# Patient Record
Sex: FEMALE | Race: BLACK OR AFRICAN AMERICAN | NOT HISPANIC OR LATINO | Employment: UNEMPLOYED | URBAN - METROPOLITAN AREA
[De-identification: names, ages, dates, MRNs, and addresses within clinical notes are randomized per-mention and may not be internally consistent; named-entity substitution may affect disease eponyms.]

---

## 2017-05-06 ENCOUNTER — HOSPITAL ENCOUNTER (EMERGENCY)
Facility: OTHER | Age: 32
Discharge: HOME | End: 2017-05-07
Attending: EMERGENCY MEDICINE
Payer: COMMERCIAL

## 2017-05-06 DIAGNOSIS — M25.519 SHOULDER PAIN: ICD-10-CM

## 2017-05-06 DIAGNOSIS — R07.89 ACUTE CHEST WALL PAIN: ICD-10-CM

## 2017-05-06 DIAGNOSIS — M54.2 NECK PAIN: ICD-10-CM

## 2017-05-06 LAB
B-HCG UR QL: NEGATIVE
CTP QC/QA: YES

## 2017-05-06 PROCEDURE — 63600175 PHARM REV CODE 636 W HCPCS: Performed by: EMERGENCY MEDICINE

## 2017-05-06 PROCEDURE — 99284 EMERGENCY DEPT VISIT MOD MDM: CPT | Mod: 25

## 2017-05-06 PROCEDURE — 81025 URINE PREGNANCY TEST: CPT | Performed by: EMERGENCY MEDICINE

## 2017-05-06 PROCEDURE — 96372 THER/PROPH/DIAG INJ SC/IM: CPT

## 2017-05-06 RX ORDER — INSULIN ASPART 100 [IU]/ML
INJECTION, SUSPENSION SUBCUTANEOUS
COMMUNITY

## 2017-05-06 RX ORDER — INSULIN GLARGINE 100 [IU]/ML
INJECTION, SOLUTION SUBCUTANEOUS NIGHTLY
COMMUNITY

## 2017-05-06 RX ORDER — ORPHENADRINE CITRATE 30 MG/ML
60 INJECTION INTRAMUSCULAR; INTRAVENOUS
Status: COMPLETED | OUTPATIENT
Start: 2017-05-06 | End: 2017-05-06

## 2017-05-06 RX ORDER — ALBUTEROL SULFATE 0.63 MG/3ML
0.63 SOLUTION RESPIRATORY (INHALATION) EVERY 6 HOURS PRN
COMMUNITY

## 2017-05-06 RX ORDER — KETOROLAC TROMETHAMINE 30 MG/ML
60 INJECTION, SOLUTION INTRAMUSCULAR; INTRAVENOUS
Status: COMPLETED | OUTPATIENT
Start: 2017-05-06 | End: 2017-05-06

## 2017-05-06 RX ORDER — LISINOPRIL 10 MG/1
10 TABLET ORAL DAILY
COMMUNITY

## 2017-05-06 RX ADMIN — KETOROLAC TROMETHAMINE 60 MG: 30 INJECTION, SOLUTION INTRAMUSCULAR at 10:05

## 2017-05-06 RX ADMIN — ORPHENADRINE CITRATE 60 MG: 30 INJECTION INTRAMUSCULAR; INTRAVENOUS at 10:05
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
## 2017-05-07 VITALS
TEMPERATURE: 99 F | SYSTOLIC BLOOD PRESSURE: 143 MMHG | WEIGHT: 250 LBS | HEART RATE: 93 BPM | DIASTOLIC BLOOD PRESSURE: 73 MMHG | OXYGEN SATURATION: 98 % | RESPIRATION RATE: 14 BRPM | HEIGHT: 66 IN | BODY MASS INDEX: 40.18 KG/M2

## 2017-05-07 RX ORDER — CYCLOBENZAPRINE HCL 10 MG
10 TABLET ORAL 3 TIMES DAILY PRN
Qty: 15 TABLET | Refills: 0 | Status: SHIPPED | OUTPATIENT
Start: 2017-05-07 | End: 2017-05-12

## 2017-05-07 RX ORDER — OXAPROZIN 600 MG/1
1200 TABLET, FILM COATED ORAL DAILY
Qty: 20 TABLET | Refills: 0 | Status: SHIPPED | OUTPATIENT
Start: 2017-05-07 | End: 2017-05-17
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
## 2017-05-07 NOTE — DISCHARGE INSTRUCTIONS
RICE     Rest an injury, elevate it, and use ice and compression as directed.   RICE stands for rest, ice, compression, and elevation. These can limit pain and swelling after an injury. RICE may be recommended to help treat fractures, sprains, strains, and bruises or bumps.   Home care  The following explain the details of RICE:  · Rest. Limit the use of the injured body part. This helps prevent further damage to the body part and gives it time to heal. In some cases, you may need a sling, brace, splint, or cast to help keep the body part still until it has healed.  · Ice. Applying ice right after an injury helps relieve pain and swelling. Wrap a bag of ice in a thin towel. Then, place it over the injured area. Do this for 10 to 15 minutes every 3 to 4 hours. Continue for the next 1 to 3 days or until your symptoms improve. Never put ice directly on your skin or ice an area longer than 15 minutes at a time.  · Compression. Putting pressure on an injury helps reduce swelling and provides support. Wrap the injured area firmly with an elastic bandage/wrap. Make sure not to wrap the bandage too tightly or you will cut off blood flow to the injured area. If your bandage loosens, rewrap it.  · Elevation. Keeping an injury raised above the level of your heart reduces swelling, pain, and throbbing. For instance, if you have a broken leg, it may help to rest your leg on several pillows when sitting or lying down. Try to keep the injured area elevated for at least 2 to 3 hours per day.  Follow-up care  Follow up with your healthcare provider, or as advised.  When to seek medical advice  Call your healthcare provider right away if any of these occur:  · Fever of 100.4°F (38°C) or higher, or as directed by your healthcare provider  · Increased pain or swelling in the injured body part  · Injured body part becomes cold, blue, numb, or tingly  · Signs of infection. These include warmth in the skin, redness, drainage, or bad  smell coming from the injured body part.  Date Last Reviewed: 1/18/2016  © 7630-9245 [employer]. [address] [place] [address], [place] [address]. All rights reserved. This information is not intended as a substitute for professional medical care. Always follow your healthcare professional's instructions.

## 2023-10-05 ENCOUNTER — TELEPHONE (OUTPATIENT)
Dept: TRANSPLANT | Facility: HOSPITAL | Age: 38
End: 2023-10-05
Payer: MEDICARE

## 2023-10-05 RX ORDER — ATORVASTATIN CALCIUM 20 MG/1
20 TABLET, FILM COATED ORAL DAILY
COMMUNITY
Start: 2023-02-09

## 2023-10-05 RX ORDER — AMLODIPINE BESYLATE 5 MG/1
5 TABLET ORAL DAILY
COMMUNITY
Start: 2023-06-22 | End: 2023-07-22 | Stop reason: WASHOUT

## 2023-10-05 RX ORDER — UBIQUINOL 100 MG
CAPSULE ORAL
COMMUNITY
Start: 2023-02-22

## 2023-10-05 RX ORDER — FERROUS SULFATE 325(65) MG
1 TABLET ORAL EVERY OTHER DAY
COMMUNITY
Start: 2022-12-15

## 2023-10-05 RX ORDER — METOPROLOL SUCCINATE 50 MG/1
50 TABLET, EXTENDED RELEASE ORAL DAILY
COMMUNITY
Start: 2023-06-22 | End: 2023-07-22 | Stop reason: WASHOUT

## 2023-10-05 RX ORDER — ATORVASTATIN CALCIUM 10 MG/1
TABLET, FILM COATED ORAL
COMMUNITY
Start: 2023-08-21 | End: 2024-05-16 | Stop reason: WASHOUT

## 2023-10-05 RX ORDER — ERGOCALCIFEROL 1.25 MG/1
1 CAPSULE ORAL
COMMUNITY
Start: 2023-05-31

## 2023-10-05 RX ORDER — MONTELUKAST SODIUM 10 MG/1
10 TABLET ORAL DAILY
COMMUNITY
Start: 2023-09-15

## 2023-10-05 RX ORDER — ALBUTEROL SULFATE 90 UG/1
2 AEROSOL, METERED RESPIRATORY (INHALATION) 4 TIMES DAILY PRN
COMMUNITY
Start: 2023-02-22

## 2023-10-05 RX ORDER — CETIRIZINE HYDROCHLORIDE 10 MG/1
10 TABLET ORAL DAILY
COMMUNITY
Start: 2023-01-02 | End: 2024-03-16 | Stop reason: WASHOUT

## 2023-10-05 RX ORDER — SODIUM BICARBONATE 650 MG/1
2 TABLET ORAL 2 TIMES DAILY
COMMUNITY
Start: 2023-06-22 | End: 2024-03-16 | Stop reason: WASHOUT

## 2023-10-05 RX ORDER — METOPROLOL SUCCINATE 25 MG/1
25 TABLET, EXTENDED RELEASE ORAL DAILY
Status: ON HOLD | COMMUNITY
Start: 2023-09-15 | End: 2023-12-29 | Stop reason: DRUGHIGH

## 2023-10-05 RX ORDER — MEDROXYPROGESTERONE ACETATE 150 MG/ML
INJECTION, SUSPENSION INTRAMUSCULAR
COMMUNITY
Start: 2023-09-14 | End: 2023-12-31 | Stop reason: HOSPADM

## 2023-10-05 RX ORDER — INSULIN GLARGINE 100 [IU]/ML
INJECTION, SOLUTION SUBCUTANEOUS
COMMUNITY
Start: 2023-09-15

## 2023-10-05 RX ORDER — FLUTICASONE PROPIONATE 44 UG/1
2 AEROSOL, METERED RESPIRATORY (INHALATION) 2 TIMES DAILY
COMMUNITY
Start: 2023-02-22

## 2023-10-05 RX ORDER — INSULIN ASPART 100 [IU]/ML
INJECTION, SOLUTION INTRAVENOUS; SUBCUTANEOUS
COMMUNITY
Start: 2023-09-15

## 2023-10-05 RX ORDER — ALBUTEROL SULFATE 0.63 MG/3ML
SOLUTION RESPIRATORY (INHALATION)
COMMUNITY
Start: 2023-01-04

## 2023-10-05 RX ORDER — CALCITRIOL 0.5 UG/1
0.5 CAPSULE ORAL DAILY
COMMUNITY
Start: 2023-09-15 | End: 2024-05-16 | Stop reason: WASHOUT

## 2023-10-05 RX ORDER — PEN NEEDLE, DIABETIC 32GX 5/32"
NEEDLE, DISPOSABLE MISCELLANEOUS
COMMUNITY
Start: 2023-02-22

## 2023-10-20 ENCOUNTER — DOCUMENTATION (OUTPATIENT)
Dept: TRANSPLANT | Facility: HOSPITAL | Age: 38
End: 2023-10-20

## 2023-10-20 ENCOUNTER — LAB (OUTPATIENT)
Dept: LAB | Facility: LAB | Age: 38
End: 2023-10-20
Payer: MEDICARE

## 2023-10-20 ENCOUNTER — OFFICE VISIT (OUTPATIENT)
Dept: TRANSPLANT | Facility: HOSPITAL | Age: 38
End: 2023-10-20
Payer: MEDICARE

## 2023-10-20 ENCOUNTER — APPOINTMENT (OUTPATIENT)
Dept: TRANSPLANT | Facility: HOSPITAL | Age: 38
End: 2023-10-20
Payer: MEDICARE

## 2023-10-20 ENCOUNTER — SOCIAL WORK (OUTPATIENT)
Dept: TRANSPLANT | Facility: HOSPITAL | Age: 38
End: 2023-10-20
Payer: MEDICARE

## 2023-10-20 VITALS
BODY MASS INDEX: 44.41 KG/M2 | WEIGHT: 293 LBS | HEIGHT: 68 IN | OXYGEN SATURATION: 95 % | TEMPERATURE: 97.6 F | DIASTOLIC BLOOD PRESSURE: 80 MMHG | SYSTOLIC BLOOD PRESSURE: 145 MMHG | HEART RATE: 101 BPM

## 2023-10-20 DIAGNOSIS — E13.29 OTHER SPECIFIED DIABETES MELLITUS WITH OTHER DIABETIC KIDNEY COMPLICATION (MULTI): ICD-10-CM

## 2023-10-20 DIAGNOSIS — Z01.818 PRE-TRANSPLANT EVALUATION FOR KIDNEY TRANSPLANT: Primary | ICD-10-CM

## 2023-10-20 DIAGNOSIS — Z51.81 ENCOUNTER FOR THERAPEUTIC DRUG LEVEL MONITORING: ICD-10-CM

## 2023-10-20 DIAGNOSIS — N18.6 END STAGE RENAL DISEASE (MULTI): ICD-10-CM

## 2023-10-20 LAB
ABO GROUP (TYPE) IN BLOOD: NORMAL
ALBUMIN SERPL BCP-MCNC: 3.6 G/DL (ref 3.4–5)
ALP SERPL-CCNC: 77 U/L (ref 33–110)
ALT SERPL W P-5'-P-CCNC: 8 U/L (ref 7–45)
AMYLASE SERPL-CCNC: 21 U/L (ref 29–103)
APPEARANCE UR: CLEAR
AST SERPL W P-5'-P-CCNC: 9 U/L (ref 9–39)
BACTERIA #/AREA URNS AUTO: ABNORMAL /HPF
BILIRUB DIRECT SERPL-MCNC: 0.1 MG/DL (ref 0–0.3)
BILIRUB SERPL-MCNC: 0.4 MG/DL (ref 0–1.2)
BILIRUB UR STRIP.AUTO-MCNC: NEGATIVE MG/DL
BUN SERPL-MCNC: 39 MG/DL (ref 6–23)
C PEPTIDE SERPL-MCNC: 6.6 NG/ML (ref 0.7–3.9)
COLOR UR: ABNORMAL
CREAT SERPL-MCNC: 6.22 MG/DL (ref 0.5–1.05)
EBV EA IGG SER QL: NEGATIVE
EBV NA AB SER QL: POSITIVE
EBV VCA IGG SER IA-ACNC: POSITIVE
EBV VCA IGM SER IA-ACNC: ABNORMAL
ERYTHROCYTE [DISTWIDTH] IN BLOOD BY AUTOMATED COUNT: 13.3 % (ref 11.5–14.5)
EST. AVERAGE GLUCOSE BLD GHB EST-MCNC: 329 MG/DL
GFR SERPL CREATININE-BSD FRML MDRD: 8 ML/MIN/1.73M*2
GLUCOSE UR STRIP.AUTO-MCNC: ABNORMAL MG/DL
HBA1C MFR BLD: 13.1 %
HBV CORE AB SER QL: REACTIVE
HBV SURFACE AB SER-ACNC: 39.2 MIU/ML
HBV SURFACE AG SERPL QL IA: NONREACTIVE
HCT VFR BLD AUTO: 35.2 % (ref 36–46)
HCV AB SER QL: NONREACTIVE
HGB BLD-MCNC: 11 G/DL (ref 12–16)
HIV 1+2 AB+HIV1 P24 AG SERPL QL IA: NONREACTIVE
HOLD SPECIMEN: NORMAL
KETONES UR STRIP.AUTO-MCNC: NEGATIVE MG/DL
LEUKOCYTE ESTERASE UR QL STRIP.AUTO: NEGATIVE
MCH RBC QN AUTO: 27 PG (ref 26–34)
MCHC RBC AUTO-ENTMCNC: 31.3 G/DL (ref 32–36)
MCV RBC AUTO: 86 FL (ref 80–100)
NITRITE UR QL STRIP.AUTO: NEGATIVE
NRBC BLD-RTO: 0 /100 WBCS (ref 0–0)
PH UR STRIP.AUTO: 8 [PH]
PHOSPHATE SERPL-MCNC: 5.2 MG/DL (ref 2.5–4.9)
PLATELET # BLD AUTO: 212 X10*3/UL (ref 150–450)
PMV BLD AUTO: 10.7 FL (ref 7.5–11.5)
PROT SERPL-MCNC: 6.8 G/DL (ref 6.4–8.2)
PROT UR STRIP.AUTO-MCNC: ABNORMAL MG/DL
RBC # BLD AUTO: 4.08 X10*6/UL (ref 4–5.2)
RBC # UR STRIP.AUTO: ABNORMAL /UL
RBC #/AREA URNS AUTO: ABNORMAL /HPF
RH FACTOR (ANTIGEN D): NORMAL
SP GR UR STRIP.AUTO: 1.01
SQUAMOUS #/AREA URNS AUTO: ABNORMAL /HPF
T PALLIDUM AB SER QL: NONREACTIVE
UROBILINOGEN UR STRIP.AUTO-MCNC: <2 MG/DL
WBC # BLD AUTO: 8.9 X10*3/UL (ref 4.4–11.3)
WBC #/AREA URNS AUTO: ABNORMAL /HPF

## 2023-10-20 PROCEDURE — 86803 HEPATITIS C AB TEST: CPT

## 2023-10-20 PROCEDURE — 87389 HIV-1 AG W/HIV-1&-2 AB AG IA: CPT

## 2023-10-20 PROCEDURE — 86663 EPSTEIN-BARR ANTIBODY: CPT

## 2023-10-20 PROCEDURE — 82150 ASSAY OF AMYLASE: CPT

## 2023-10-20 PROCEDURE — 86780 TREPONEMA PALLIDUM: CPT

## 2023-10-20 PROCEDURE — 89240 UNLISTED MISC PATH TEST: CPT | Performed by: STUDENT IN AN ORGANIZED HEALTH CARE EDUCATION/TRAINING PROGRAM

## 2023-10-20 PROCEDURE — 85027 COMPLETE CBC AUTOMATED: CPT

## 2023-10-20 PROCEDURE — 84100 ASSAY OF PHOSPHORUS: CPT

## 2023-10-20 PROCEDURE — 80323 ALKALOIDS NOS: CPT

## 2023-10-20 PROCEDURE — 86665 EPSTEIN-BARR CAPSID VCA: CPT

## 2023-10-20 PROCEDURE — 84520 ASSAY OF UREA NITROGEN: CPT

## 2023-10-20 PROCEDURE — 86900 BLOOD TYPING SEROLOGIC ABO: CPT

## 2023-10-20 PROCEDURE — 80076 HEPATIC FUNCTION PANEL: CPT

## 2023-10-20 PROCEDURE — 80307 DRUG TEST PRSMV CHEM ANLYZR: CPT

## 2023-10-20 PROCEDURE — 86825 HLA X-MATH NON-CYTOTOXIC: CPT | Performed by: STUDENT IN AN ORGANIZED HEALTH CARE EDUCATION/TRAINING PROGRAM

## 2023-10-20 PROCEDURE — 86644 CMV ANTIBODY: CPT

## 2023-10-20 PROCEDURE — 87799 DETECT AGENT NOS DNA QUANT: CPT

## 2023-10-20 PROCEDURE — 86664 EPSTEIN-BARR NUCLEAR ANTIGEN: CPT

## 2023-10-20 PROCEDURE — 83036 HEMOGLOBIN GLYCOSYLATED A1C: CPT

## 2023-10-20 PROCEDURE — 86481 TB AG RESPONSE T-CELL SUSP: CPT

## 2023-10-20 PROCEDURE — 36415 COLL VENOUS BLD VENIPUNCTURE: CPT

## 2023-10-20 PROCEDURE — 86704 HEP B CORE ANTIBODY TOTAL: CPT

## 2023-10-20 PROCEDURE — 87340 HEPATITIS B SURFACE AG IA: CPT

## 2023-10-20 PROCEDURE — 81001 URINALYSIS AUTO W/SCOPE: CPT

## 2023-10-20 PROCEDURE — 82565 ASSAY OF CREATININE: CPT

## 2023-10-20 PROCEDURE — 86706 HEP B SURFACE ANTIBODY: CPT

## 2023-10-20 PROCEDURE — 81382 HLA II TYPING 1 LOC HR: CPT | Performed by: STUDENT IN AN ORGANIZED HEALTH CARE EDUCATION/TRAINING PROGRAM

## 2023-10-20 PROCEDURE — 86901 BLOOD TYPING SEROLOGIC RH(D): CPT

## 2023-10-20 PROCEDURE — 84681 ASSAY OF C-PEPTIDE: CPT

## 2023-10-20 SDOH — ECONOMIC STABILITY: FOOD INSECURITY: WITHIN THE PAST 12 MONTHS, YOU WORRIED THAT YOUR FOOD WOULD RUN OUT BEFORE YOU GOT MONEY TO BUY MORE.: SOMETIMES TRUE

## 2023-10-20 SDOH — ECONOMIC STABILITY: FOOD INSECURITY: WITHIN THE PAST 12 MONTHS, THE FOOD YOU BOUGHT JUST DIDN'T LAST AND YOU DIDN'T HAVE MONEY TO GET MORE.: OFTEN TRUE

## 2023-10-20 SDOH — ECONOMIC STABILITY: TRANSPORTATION INSECURITY
IN THE PAST 12 MONTHS, HAS THE LACK OF TRANSPORTATION KEPT YOU FROM MEDICAL APPOINTMENTS OR FROM GETTING MEDICATIONS?: NO

## 2023-10-20 SDOH — ECONOMIC STABILITY: TRANSPORTATION INSECURITY
IN THE PAST 12 MONTHS, HAS LACK OF TRANSPORTATION KEPT YOU FROM MEETINGS, WORK, OR FROM GETTING THINGS NEEDED FOR DAILY LIVING?: NO

## 2023-10-20 ASSESSMENT — PATIENT HEALTH QUESTIONNAIRE - PHQ9
8. MOVING OR SPEAKING SO SLOWLY THAT OTHER PEOPLE COULD HAVE NOTICED. OR THE OPPOSITE, BEING SO FIGETY OR RESTLESS THAT YOU HAVE BEEN MOVING AROUND A LOT MORE THAN USUAL: NOT AT ALL
1. LITTLE INTEREST OR PLEASURE IN DOING THINGS: SEVERAL DAYS
2. FEELING DOWN, DEPRESSED OR HOPELESS: NOT AT ALL
4. FEELING TIRED OR HAVING LITTLE ENERGY: NEARLY EVERY DAY
2. FEELING DOWN, DEPRESSED OR HOPELESS: NOT AT ALL
7. TROUBLE CONCENTRATING ON THINGS, SUCH AS READING THE NEWSPAPER OR WATCHING TELEVISION: NOT AT ALL
1. LITTLE INTEREST OR PLEASURE IN DOING THINGS: SEVERAL DAYS
SUM OF ALL RESPONSES TO PHQ9 QUESTIONS 1 & 2: 1
SUM OF ALL RESPONSES TO PHQ9 QUESTIONS 1 & 2: 1
3. TROUBLE FALLING OR STAYING ASLEEP OR SLEEPING TOO MUCH: NEARLY EVERY DAY
9. THOUGHTS THAT YOU WOULD BE BETTER OFF DEAD, OR OF HURTING YOURSELF: NOT AT ALL
10. IF YOU CHECKED OFF ANY PROBLEMS, HOW DIFFICULT HAVE THESE PROBLEMS MADE IT FOR YOU TO DO YOUR WORK, TAKE CARE OF THINGS AT HOME, OR GET ALONG WITH OTHER PEOPLE: NOT DIFFICULT AT ALL
5. POOR APPETITE OR OVEREATING: NOT AT ALL
6. FEELING BAD ABOUT YOURSELF - OR THAT YOU ARE A FAILURE OR HAVE LET YOURSELF OR YOUR FAMILY DOWN: MORE THAN HALF THE DAYS
SUM OF ALL RESPONSES TO PHQ QUESTIONS 1-9: 9

## 2023-10-20 ASSESSMENT — ANXIETY QUESTIONNAIRES
2. NOT BEING ABLE TO STOP OR CONTROL WORRYING: NEARLY EVERY DAY
GAD7 TOTAL SCORE: 12
6. BECOMING EASILY ANNOYED OR IRRITABLE: NEARLY EVERY DAY
5. BEING SO RESTLESS THAT IT IS HARD TO SIT STILL: NOT AT ALL
3. WORRYING TOO MUCH ABOUT DIFFERENT THINGS: MORE THAN HALF THE DAYS
IF YOU CHECKED OFF ANY PROBLEMS ON THIS QUESTIONNAIRE, HOW DIFFICULT HAVE THESE PROBLEMS MADE IT FOR YOU TO DO YOUR WORK, TAKE CARE OF THINGS AT HOME, OR GET ALONG WITH OTHER PEOPLE: SOMEWHAT DIFFICULT
7. FEELING AFRAID AS IF SOMETHING AWFUL MIGHT HAPPEN: NOT AT ALL
4. TROUBLE RELAXING: NEARLY EVERY DAY
1. FEELING NERVOUS, ANXIOUS, OR ON EDGE: SEVERAL DAYS

## 2023-10-20 ASSESSMENT — COLUMBIA-SUICIDE SEVERITY RATING SCALE - C-SSRS
1. IN THE PAST MONTH, HAVE YOU WISHED YOU WERE DEAD OR WISHED YOU COULD GO TO SLEEP AND NOT WAKE UP?: NO
6. HAVE YOU EVER DONE ANYTHING, STARTED TO DO ANYTHING, OR PREPARED TO DO ANYTHING TO END YOUR LIFE?: NO
2. HAVE YOU ACTUALLY HAD ANY THOUGHTS OF KILLING YOURSELF?: NO

## 2023-10-20 ASSESSMENT — PAIN SCALES - GENERAL: PAINLEVEL: 0-NO PAIN
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
## 2023-10-20 NOTE — PROGRESS NOTES
PRE-TRANSPLANT EDUCATION  Patient received education regarding the following topics as part of their pre-transplant evaluation:  The evaluation process, including:   Transplant team members and roles    Required consultations and testing   Selection criteria and suitability for transplant   Listing process and receiving an organ offer   Psychosocial and financial considerations for a successful transplant   Patient responsibilities, including the necessity of adhering to a strict medical regimen  An overview of the surgical procedure   Potential medical, surgical, and psychosocial risks to transplantation, including:   Wound infection   Pneumonia   Blood clot formation   Organ rejection, failure, and possibility of re-transplantation   Lifetime immunosuppression therapy and associated risks   Arrhythmias and cardiovascular collapse   Multi-organ system failure   Death   Depression   Post-Traumatic Stress Disorder   Generalized anxiety, issues of dependence, and feelings of guilt  Available alternatives to transplantation  Donor risk factors that could affect the success of the transplant and the health of the patient, including:   Donor age   Donor medical and social history   Condition of the organ   Risk of jacinta cancer, HIV, Hepatitis B, Hepatitis C, or malaria if the infection is not detectable at the time of donation  Patient?s right to withdraw consent for transplantation at any time during the process  Transplants not performed in a Medicare-approved transplant center could affect the patient?s ability to have immunosuppression medication paid for under Medicare part B.   Multiple listing options.    Patient was given the opportunity to have questions answered. Patient was provided a copy of the informed consent for transplant evaluation.    Signed evaluation informed consent received? 10/20/2023

## 2023-10-21 LAB — CMV IGG AVIDITY SERPL IA-RTO: REACTIVE %

## 2023-10-22 LAB
AMPHETAMINES SERPL QL SCN: NEGATIVE NG/ML
ANNOTATION COMMENT IMP: NORMAL
BARBITURATES SERPL QL SCN: NEGATIVE NG/ML
BENZODIAZ SERPL QL SCN: NEGATIVE NG/ML
BUPRENORPHINE SERPL-MCNC: NEGATIVE NG/ML
CANNABINOIDS SERPL QL SCN: NEGATIVE NG/ML
COCAINE SERPL QL SCN: NEGATIVE NG/ML
METHADONE SERPL QL SCN: NEGATIVE NG/ML
METHAMPHET SERPL QL: NEGATIVE NG/ML
NIL(NEG) CONTROL SPOT COUNT: NORMAL
OPIATES SERPL QL SCN: NEGATIVE NG/ML
OXYCODONE SERPL QL: NEGATIVE NG/ML
PANEL A SPOT COUNT: 1
PANEL B SPOT COUNT: 1
PCP SERPL QL SCN: NEGATIVE NG/ML
POS CONTROL SPOT COUNT: NORMAL
T-SPOT. TB INTERPRETATION: NEGATIVE

## 2023-10-23 LAB
COTININE SERPL-MCNC: <5 NG/ML
FLOW AUTOCROSSMATCH: NORMAL
FLOW AUTOCROSSMATCH: NORMAL
HLA CLASS I AB SCREEN,FC: NORMAL
HLA CLASS II AB SCREEN,FC: NORMAL
HLA CLS I TYP PNL BLD/T DONR HIGH RES: NORMAL
HLA RESULTS: NORMAL
HLA RESULTS: NORMAL
HLA-DP2 QL: NORMAL
HLA-DQB1 HIGH RES: NORMAL
HLA-DRB1 HIGH RES: NORMAL
NICOTINE SERPL-MCNC: <5 NG/ML

## 2023-10-24 LAB
SCAN RESULT: NORMAL
VZV QPCR,QUANT,PLASMA, VIRC: NOT DETECTED COPIES/ML

## 2023-10-25 PROCEDURE — 93306 TTE W/DOPPLER COMPLETE: CPT | Performed by: INTERNAL MEDICINE

## 2023-10-27 PROCEDURE — 93308 TTE F-UP OR LMTD: CPT | Performed by: INTERNAL MEDICINE

## 2023-10-30 LAB
HLA CLS I TYP PNL BLD/T DONR HIGH RES: NORMAL
HLA RESULTS: NORMAL
HLA-DP2 QL: NORMAL
HLA-DQB1 HIGH RES: NORMAL
HLA-DRB1 HIGH RES: NORMAL

## 2023-11-01 LAB
HLA CLASS I AB SCREEN,FC: NORMAL
HLA CLASS II AB SCREEN,FC: NORMAL
HLA RESULTS: NORMAL

## 2023-11-02 ENCOUNTER — COMMITTEE REVIEW (OUTPATIENT)
Dept: TRANSPLANT | Facility: HOSPITAL | Age: 38
End: 2023-11-02
Payer: MEDICARE

## 2023-11-02 NOTE — COMMITTEE REVIEW
Patient discussed at selection committee.  Close eval.  She will need to have stable housing, she will need to establish care with a mental health professional and we will need her to come back with her primary support to see SW once this has been established.  Will put in a referral for the food pantry.

## 2023-11-02 NOTE — LETTER
November 2, 2023    Alexus Haley  [address] [facility] [address]      Dear Ms. Haley:    Our multi-disciplinary transplant team completed a review of your medical records on 11/2/2023.  ***    Our transplant program consists of surgeons and medical doctors who provide coverage 365 days a year, 24 hours a day.     If you have any questions or concerns regarding your insurance coverage or billing issues, a  is available to speak with you.     It is important to keep us updated of any major changes in your medical condition, contact information and health insurance coverage.     Please don't hesitate to contact us at Dept: [phone] with any questions or concerns. We look forward to working with you through this process.      Sincerely,      Em Cardona RN          The OS Toll-free Patient Services Line:  Your Resource for Organ Transplant Information    If you have a question regarding your own medical care, you always should call your transplant hospital first. However, for general organ transplant-related information, you should call the United Network for Organ Sharing (UNOS) toll-free patient services line at 1-554.501.4591.  Anyone, including potential transplant candidates, candidates, recipients, family members, friends, living donors, and donor family members, can call this number to:    Talk about organ donation, living donation, the transplant process, the donation process, and transplant policies.  Get a free patient information kit with helpful booklets, waiting list and transplant information, and a list of all transplant hospitals.  Ask questions about the Organ Procurement and Transplantation Network (OPTN) web site (http://optn.transplant.hrsa.gov/), the UNOS Web site (http://unos.org/), or the UNOS web site for living donors and transplant recipients. (http://www.transplantliving.org/).  Learn how UNOS and the OPTN can help you.  Talk about any concerns that you may  have with a transplant hospital.    [facility] is a not-for-profit organization that provides the administrative services for the national OPTN under federal contract to the Health Resources and Services Administration (HRSA), an agency under the U.S. Department of Health and Human Services (HHS).    [facility] and the OPTN are responsible for:    Providing educational material for patients, the public, and professionals.  Raising awareness of the need for donated organs and tissue.  Writing organ transplant policy with help from transplant professionals, transplant patients, transplant candidates, donor families, living donors, and the public.  Coordinating organ procurement, matching, and placement.  Collecting information about every organ transplant and donation that occurs in the [place].    Remember, you should contact your transplant hospital directly if you have questions or concerns about your own medical care including medical records, work-up progress, and test results.    [facility] is not your transplant hospital, and staff at [facility] will not be able to transfer you to your transplant hospital, so keep your transplant hospital’s phone number handy.    However, while you research your transplant needs and learn as much as you can about transplantation and donation, we welcome your call to our toll-free patient services line at [phone].      [facility] PIL Final Rev 1-

## 2023-11-06 ENCOUNTER — DOCUMENTATION (OUTPATIENT)
Dept: TRANSPLANT | Facility: HOSPITAL | Age: 38
End: 2023-11-06
Payer: MEDICARE

## 2023-12-28 ENCOUNTER — APPOINTMENT (OUTPATIENT)
Dept: CARDIOLOGY | Facility: HOSPITAL | Age: 38
DRG: 640 | End: 2023-12-28
Payer: MEDICARE

## 2023-12-28 ENCOUNTER — APPOINTMENT (OUTPATIENT)
Dept: RADIOLOGY | Facility: HOSPITAL | Age: 38
DRG: 640 | End: 2023-12-28
Payer: MEDICARE

## 2023-12-28 ENCOUNTER — HOSPITAL ENCOUNTER (INPATIENT)
Facility: HOSPITAL | Age: 38
LOS: 1 days | Discharge: HOME | DRG: 640 | End: 2023-12-31
Attending: STUDENT IN AN ORGANIZED HEALTH CARE EDUCATION/TRAINING PROGRAM | Admitting: INTERNAL MEDICINE
Payer: MEDICARE

## 2023-12-28 DIAGNOSIS — R06.02 SHORTNESS OF BREATH: Primary | ICD-10-CM

## 2023-12-28 DIAGNOSIS — N18.6 ESRD (END STAGE RENAL DISEASE) ON DIALYSIS (MULTI): ICD-10-CM

## 2023-12-28 DIAGNOSIS — I27.82 OTHER CHRONIC PULMONARY EMBOLISM WITHOUT ACUTE COR PULMONALE (MULTI): ICD-10-CM

## 2023-12-28 DIAGNOSIS — Z99.2 ESRD (END STAGE RENAL DISEASE) ON DIALYSIS (MULTI): ICD-10-CM

## 2023-12-28 PROBLEM — E87.70 VOLUME OVERLOAD: Status: ACTIVE | Noted: 2023-12-28

## 2023-12-28 LAB
ALBUMIN SERPL BCP-MCNC: 3.7 G/DL (ref 3.4–5)
ALP SERPL-CCNC: 77 U/L (ref 33–110)
ALT SERPL W P-5'-P-CCNC: 6 U/L (ref 7–45)
ANION GAP SERPL CALC-SCNC: 19 MMOL/L (ref 10–20)
AST SERPL W P-5'-P-CCNC: 9 U/L (ref 9–39)
BASOPHILS # BLD AUTO: 0.04 X10*3/UL (ref 0–0.1)
BASOPHILS NFR BLD AUTO: 0.4 %
BILIRUB SERPL-MCNC: 0.4 MG/DL (ref 0–1.2)
BNP SERPL-MCNC: 84 PG/ML (ref 0–99)
BUN SERPL-MCNC: 71 MG/DL (ref 6–23)
CALCIUM SERPL-MCNC: 7.7 MG/DL (ref 8.6–10.3)
CARDIAC TROPONIN I PNL SERPL HS: 13 NG/L (ref 0–13)
CHLORIDE SERPL-SCNC: 103 MMOL/L (ref 98–107)
CO2 SERPL-SCNC: 19 MMOL/L (ref 21–32)
CREAT SERPL-MCNC: 8.94 MG/DL (ref 0.5–1.05)
EOSINOPHIL # BLD AUTO: 0.17 X10*3/UL (ref 0–0.7)
EOSINOPHIL NFR BLD AUTO: 1.5 %
ERYTHROCYTE [DISTWIDTH] IN BLOOD BY AUTOMATED COUNT: 14.4 % (ref 11.5–14.5)
GFR SERPL CREATININE-BSD FRML MDRD: 5 ML/MIN/1.73M*2
GLUCOSE SERPL-MCNC: 106 MG/DL (ref 74–99)
HCT VFR BLD AUTO: 33.4 % (ref 36–46)
HGB BLD-MCNC: 10.4 G/DL (ref 12–16)
IMM GRANULOCYTES # BLD AUTO: 0.05 X10*3/UL (ref 0–0.7)
IMM GRANULOCYTES NFR BLD AUTO: 0.5 % (ref 0–0.9)
LYMPHOCYTES # BLD AUTO: 1.63 X10*3/UL (ref 1.2–4.8)
LYMPHOCYTES NFR BLD AUTO: 14.8 %
MCH RBC QN AUTO: 27.7 PG (ref 26–34)
MCHC RBC AUTO-ENTMCNC: 31.1 G/DL (ref 32–36)
MCV RBC AUTO: 89 FL (ref 80–100)
MONOCYTES # BLD AUTO: 0.59 X10*3/UL (ref 0.1–1)
MONOCYTES NFR BLD AUTO: 5.4 %
NEUTROPHILS # BLD AUTO: 8.53 X10*3/UL (ref 1.2–7.7)
NEUTROPHILS NFR BLD AUTO: 77.4 %
NRBC BLD-RTO: 0 /100 WBCS (ref 0–0)
PLATELET # BLD AUTO: 306 X10*3/UL (ref 150–450)
POTASSIUM SERPL-SCNC: 4.3 MMOL/L (ref 3.5–5.3)
PROT SERPL-MCNC: 7.4 G/DL (ref 6.4–8.2)
RBC # BLD AUTO: 3.76 X10*6/UL (ref 4–5.2)
SODIUM SERPL-SCNC: 137 MMOL/L (ref 136–145)
WBC # BLD AUTO: 11 X10*3/UL (ref 4.4–11.3)

## 2023-12-28 PROCEDURE — 83880 ASSAY OF NATRIURETIC PEPTIDE: CPT | Performed by: PHYSICIAN ASSISTANT

## 2023-12-28 PROCEDURE — G0378 HOSPITAL OBSERVATION PER HR: HCPCS

## 2023-12-28 PROCEDURE — 80053 COMPREHEN METABOLIC PANEL: CPT | Performed by: PHYSICIAN ASSISTANT

## 2023-12-28 PROCEDURE — 36415 COLL VENOUS BLD VENIPUNCTURE: CPT | Performed by: PHYSICIAN ASSISTANT

## 2023-12-28 PROCEDURE — 71045 X-RAY EXAM CHEST 1 VIEW: CPT | Performed by: RADIOLOGY

## 2023-12-28 PROCEDURE — 85025 COMPLETE CBC W/AUTO DIFF WBC: CPT | Performed by: PHYSICIAN ASSISTANT

## 2023-12-28 PROCEDURE — 93005 ELECTROCARDIOGRAM TRACING: CPT

## 2023-12-28 PROCEDURE — 71045 X-RAY EXAM CHEST 1 VIEW: CPT

## 2023-12-28 PROCEDURE — 96375 TX/PRO/DX INJ NEW DRUG ADDON: CPT

## 2023-12-28 PROCEDURE — 99285 EMERGENCY DEPT VISIT HI MDM: CPT | Performed by: STUDENT IN AN ORGANIZED HEALTH CARE EDUCATION/TRAINING PROGRAM

## 2023-12-28 PROCEDURE — 84484 ASSAY OF TROPONIN QUANT: CPT | Performed by: PHYSICIAN ASSISTANT

## 2023-12-28 RX ORDER — METOPROLOL SUCCINATE 50 MG/1
50 TABLET, EXTENDED RELEASE ORAL ONCE
Status: DISCONTINUED | OUTPATIENT
Start: 2023-12-28 | End: 2023-12-28

## 2023-12-28 RX ORDER — FUROSEMIDE 10 MG/ML
60 INJECTION INTRAMUSCULAR; INTRAVENOUS ONCE
Status: COMPLETED | OUTPATIENT
Start: 2023-12-28 | End: 2023-12-29

## 2023-12-28 ASSESSMENT — LIFESTYLE VARIABLES
HAVE PEOPLE ANNOYED YOU BY CRITICIZING YOUR DRINKING: NO
HAVE YOU EVER FELT YOU SHOULD CUT DOWN ON YOUR DRINKING: NO
EVER HAD A DRINK FIRST THING IN THE MORNING TO STEADY YOUR NERVES TO GET RID OF A HANGOVER: NO
REASON UNABLE TO ASSESS: NO
EVER FELT BAD OR GUILTY ABOUT YOUR DRINKING: NO

## 2023-12-28 ASSESSMENT — PAIN SCALES - GENERAL: PAINLEVEL_OUTOF10: 0 - NO PAIN

## 2023-12-28 ASSESSMENT — COLUMBIA-SUICIDE SEVERITY RATING SCALE - C-SSRS
2. HAVE YOU ACTUALLY HAD ANY THOUGHTS OF KILLING YOURSELF?: NO
6. HAVE YOU EVER DONE ANYTHING, STARTED TO DO ANYTHING, OR PREPARED TO DO ANYTHING TO END YOUR LIFE?: NO
1. IN THE PAST MONTH, HAVE YOU WISHED YOU WERE DEAD OR WISHED YOU COULD GO TO SLEEP AND NOT WAKE UP?: NO

## 2023-12-28 ASSESSMENT — PAIN - FUNCTIONAL ASSESSMENT: PAIN_FUNCTIONAL_ASSESSMENT: 0-10

## 2023-12-29 ENCOUNTER — APPOINTMENT (OUTPATIENT)
Dept: RADIOLOGY | Facility: HOSPITAL | Age: 38
DRG: 640 | End: 2023-12-29
Payer: MEDICARE

## 2023-12-29 ENCOUNTER — APPOINTMENT (OUTPATIENT)
Dept: CARDIOLOGY | Facility: HOSPITAL | Age: 38
DRG: 640 | End: 2023-12-29
Payer: MEDICARE

## 2023-12-29 ENCOUNTER — APPOINTMENT (OUTPATIENT)
Dept: DIALYSIS | Facility: HOSPITAL | Age: 38
End: 2023-12-29
Payer: MEDICARE

## 2023-12-29 LAB
ATRIAL RATE: 96 BPM
ATRIAL RATE: 96 BPM
ATRIAL RATE: 99 BPM
CARDIAC TROPONIN I PNL SERPL HS: 13 NG/L (ref 0–13)
GLUCOSE BLD MANUAL STRIP-MCNC: 137 MG/DL (ref 74–99)
GLUCOSE BLD MANUAL STRIP-MCNC: 210 MG/DL (ref 74–99)
GLUCOSE BLD MANUAL STRIP-MCNC: 229 MG/DL (ref 74–99)
GLUCOSE BLD MANUAL STRIP-MCNC: 336 MG/DL (ref 74–99)
HOLD SPECIMEN: NORMAL
HOLD SPECIMEN: NORMAL
P AXIS: 61 DEGREES
P AXIS: 78 DEGREES
P AXIS: 81 DEGREES
P OFFSET: 185 MS
P OFFSET: 188 MS
P OFFSET: 188 MS
P ONSET: 137 MS
P ONSET: 142 MS
P ONSET: 146 MS
PR INTERVAL: 156 MS
PR INTERVAL: 166 MS
PR INTERVAL: 172 MS
Q ONSET: 223 MS
Q ONSET: 224 MS
Q ONSET: 225 MS
QRS COUNT: 15 BEATS
QRS COUNT: 15 BEATS
QRS COUNT: 16 BEATS
QRS DURATION: 74 MS
QRS DURATION: 74 MS
QRS DURATION: 76 MS
QT INTERVAL: 374 MS
QT INTERVAL: 374 MS
QT INTERVAL: 384 MS
QTC CALCULATION(BAZETT): 472 MS
QTC CALCULATION(BAZETT): 479 MS
QTC CALCULATION(BAZETT): 485 MS
QTC FREDERICIA: 437 MS
QTC FREDERICIA: 441 MS
QTC FREDERICIA: 449 MS
R AXIS: -10 DEGREES
R AXIS: -2 DEGREES
R AXIS: -20 DEGREES
T AXIS: 23 DEGREES
T AXIS: 25 DEGREES
T AXIS: 45 DEGREES
T OFFSET: 410 MS
T OFFSET: 412 MS
T OFFSET: 416 MS
VENTRICULAR RATE: 96 BPM
VENTRICULAR RATE: 96 BPM
VENTRICULAR RATE: 99 BPM

## 2023-12-29 PROCEDURE — 2500000005 HC RX 250 GENERAL PHARMACY W/O HCPCS: Performed by: INTERNAL MEDICINE

## 2023-12-29 PROCEDURE — 2550000001 HC RX 255 CONTRASTS: Performed by: INTERNAL MEDICINE

## 2023-12-29 PROCEDURE — 71275 CT ANGIOGRAPHY CHEST: CPT

## 2023-12-29 PROCEDURE — 82947 ASSAY GLUCOSE BLOOD QUANT: CPT

## 2023-12-29 PROCEDURE — 8010000001 HC DIALYSIS - HEMODIALYSIS PER DAY

## 2023-12-29 PROCEDURE — 93005 ELECTROCARDIOGRAM TRACING: CPT

## 2023-12-29 PROCEDURE — 71275 CT ANGIOGRAPHY CHEST: CPT | Performed by: RADIOLOGY

## 2023-12-29 PROCEDURE — G0378 HOSPITAL OBSERVATION PER HR: HCPCS

## 2023-12-29 PROCEDURE — 5A1D70Z PERFORMANCE OF URINARY FILTRATION, INTERMITTENT, LESS THAN 6 HOURS PER DAY: ICD-10-PCS | Performed by: INTERNAL MEDICINE

## 2023-12-29 PROCEDURE — 2500000002 HC RX 250 W HCPCS SELF ADMINISTERED DRUGS (ALT 637 FOR MEDICARE OP, ALT 636 FOR OP/ED): Performed by: INTERNAL MEDICINE

## 2023-12-29 PROCEDURE — 2500000004 HC RX 250 GENERAL PHARMACY W/ HCPCS (ALT 636 FOR OP/ED): Performed by: INTERNAL MEDICINE

## 2023-12-29 PROCEDURE — 2500000001 HC RX 250 WO HCPCS SELF ADMINISTERED DRUGS (ALT 637 FOR MEDICARE OP): Performed by: INTERNAL MEDICINE

## 2023-12-29 PROCEDURE — 93010 ELECTROCARDIOGRAM REPORT: CPT | Performed by: INTERNAL MEDICINE

## 2023-12-29 PROCEDURE — 84484 ASSAY OF TROPONIN QUANT: CPT | Performed by: INTERNAL MEDICINE

## 2023-12-29 PROCEDURE — 2500000004 HC RX 250 GENERAL PHARMACY W/ HCPCS (ALT 636 FOR OP/ED): Performed by: PHYSICIAN ASSISTANT

## 2023-12-29 PROCEDURE — 99223 1ST HOSP IP/OBS HIGH 75: CPT | Performed by: INTERNAL MEDICINE

## 2023-12-29 RX ORDER — HEPARIN SODIUM 10000 [USP'U]/100ML
0-4500 INJECTION, SOLUTION INTRAVENOUS CONTINUOUS
Status: DISCONTINUED | OUTPATIENT
Start: 2023-12-29 | End: 2023-12-30

## 2023-12-29 RX ORDER — MONTELUKAST SODIUM 10 MG/1
10 TABLET ORAL DAILY
Status: DISCONTINUED | OUTPATIENT
Start: 2023-12-29 | End: 2024-01-01 | Stop reason: HOSPADM

## 2023-12-29 RX ORDER — HEPARIN SODIUM 5000 [USP'U]/ML
5000-10000 INJECTION, SOLUTION INTRAVENOUS; SUBCUTANEOUS EVERY 4 HOURS PRN
Status: DISCONTINUED | OUTPATIENT
Start: 2023-12-29 | End: 2023-12-30

## 2023-12-29 RX ORDER — CYCLOBENZAPRINE HCL 10 MG
10 TABLET ORAL DAILY
COMMUNITY

## 2023-12-29 RX ORDER — LIDOCAINE 560 MG/1
1 PATCH PERCUTANEOUS; TOPICAL; TRANSDERMAL DAILY
Status: DISCONTINUED | OUTPATIENT
Start: 2023-12-29 | End: 2024-01-01 | Stop reason: HOSPADM

## 2023-12-29 RX ORDER — HEPARIN SODIUM 5000 [USP'U]/ML
7500 INJECTION, SOLUTION INTRAVENOUS; SUBCUTANEOUS EVERY 8 HOURS SCHEDULED
Status: DISCONTINUED | OUTPATIENT
Start: 2023-12-29 | End: 2023-12-29

## 2023-12-29 RX ORDER — DEXTROSE MONOHYDRATE 100 MG/ML
0.3 INJECTION, SOLUTION INTRAVENOUS ONCE AS NEEDED
Status: DISCONTINUED | OUTPATIENT
Start: 2023-12-29 | End: 2024-01-01 | Stop reason: HOSPADM

## 2023-12-29 RX ORDER — DEXTROSE MONOHYDRATE 100 MG/ML
0.3 INJECTION, SOLUTION INTRAVENOUS ONCE AS NEEDED
Status: DISCONTINUED | OUTPATIENT
Start: 2023-12-29 | End: 2023-12-29 | Stop reason: SDUPTHER

## 2023-12-29 RX ORDER — METOPROLOL SUCCINATE 50 MG/1
50 TABLET, EXTENDED RELEASE ORAL DAILY
COMMUNITY

## 2023-12-29 RX ORDER — SEVELAMER CARBONATE 800 MG/1
800 TABLET, FILM COATED ORAL
COMMUNITY

## 2023-12-29 RX ORDER — TRAMADOL HYDROCHLORIDE 50 MG/1
50 TABLET ORAL EVERY 12 HOURS PRN
Status: DISCONTINUED | OUTPATIENT
Start: 2023-12-29 | End: 2024-01-01 | Stop reason: HOSPADM

## 2023-12-29 RX ORDER — METOPROLOL SUCCINATE 50 MG/1
50 TABLET, EXTENDED RELEASE ORAL DAILY
Status: DISCONTINUED | OUTPATIENT
Start: 2023-12-29 | End: 2024-01-01 | Stop reason: HOSPADM

## 2023-12-29 RX ORDER — SENNOSIDES 8.6 MG/1
2 TABLET ORAL 2 TIMES DAILY
Status: DISCONTINUED | OUTPATIENT
Start: 2023-12-29 | End: 2024-01-01 | Stop reason: HOSPADM

## 2023-12-29 RX ORDER — HEPARIN SODIUM 1000 [USP'U]/ML
3000 INJECTION, SOLUTION INTRAVENOUS; SUBCUTANEOUS
Status: DISCONTINUED | OUTPATIENT
Start: 2023-12-29 | End: 2024-01-01 | Stop reason: HOSPADM

## 2023-12-29 RX ORDER — DEXTROSE 50 % IN WATER (D50W) INTRAVENOUS SYRINGE
25
Status: DISCONTINUED | OUTPATIENT
Start: 2023-12-29 | End: 2024-01-01 | Stop reason: HOSPADM

## 2023-12-29 RX ORDER — PREDNISONE 20 MG/1
40 TABLET ORAL DAILY
Status: DISCONTINUED | OUTPATIENT
Start: 2023-12-29 | End: 2023-12-30

## 2023-12-29 RX ORDER — DEXTROSE 50 % IN WATER (D50W) INTRAVENOUS SYRINGE
25
Status: DISCONTINUED | OUTPATIENT
Start: 2023-12-29 | End: 2023-12-29 | Stop reason: SDUPTHER

## 2023-12-29 RX ORDER — INSULIN LISPRO 100 [IU]/ML
0-10 INJECTION, SOLUTION INTRAVENOUS; SUBCUTANEOUS
Status: DISCONTINUED | OUTPATIENT
Start: 2023-12-29 | End: 2024-01-01 | Stop reason: HOSPADM

## 2023-12-29 RX ORDER — NITROGLYCERIN 0.4 MG/1
0.4 TABLET SUBLINGUAL EVERY 5 MIN PRN
Status: DISCONTINUED | OUTPATIENT
Start: 2023-12-29 | End: 2024-01-01 | Stop reason: HOSPADM

## 2023-12-29 RX ORDER — INSULIN GLARGINE 100 [IU]/ML
20 INJECTION, SOLUTION SUBCUTANEOUS 2 TIMES DAILY
Status: DISCONTINUED | OUTPATIENT
Start: 2023-12-29 | End: 2023-12-30

## 2023-12-29 RX ORDER — ATORVASTATIN CALCIUM 20 MG/1
20 TABLET, FILM COATED ORAL DAILY
Status: DISCONTINUED | OUTPATIENT
Start: 2023-12-29 | End: 2024-01-01 | Stop reason: HOSPADM

## 2023-12-29 RX ORDER — ALBUMIN HUMAN 250 G/1000ML
12.5 SOLUTION INTRAVENOUS AS NEEDED
Status: DISCONTINUED | OUTPATIENT
Start: 2023-12-29 | End: 2024-01-01 | Stop reason: HOSPADM

## 2023-12-29 RX ORDER — ALBUTEROL SULFATE 0.83 MG/ML
2.5 SOLUTION RESPIRATORY (INHALATION) EVERY 4 HOURS PRN
Status: DISCONTINUED | OUTPATIENT
Start: 2023-12-29 | End: 2023-12-30

## 2023-12-29 RX ORDER — METOPROLOL SUCCINATE 25 MG/1
25 TABLET, EXTENDED RELEASE ORAL DAILY
Status: DISCONTINUED | OUTPATIENT
Start: 2023-12-29 | End: 2023-12-29

## 2023-12-29 RX ADMIN — INSULIN LISPRO 8 UNITS: 100 INJECTION, SOLUTION INTRAVENOUS; SUBCUTANEOUS at 07:04

## 2023-12-29 RX ADMIN — LIDOCAINE 1 PATCH: 4 PATCH TOPICAL at 11:44

## 2023-12-29 RX ADMIN — IOHEXOL 75 ML: 350 INJECTION, SOLUTION INTRAVENOUS at 20:07

## 2023-12-29 RX ADMIN — APIXABAN 2.5 MG: 5 TABLET, FILM COATED ORAL at 09:26

## 2023-12-29 RX ADMIN — HEPARIN SODIUM 2000 UNITS/HR: 10000 INJECTION, SOLUTION INTRAVENOUS at 23:09

## 2023-12-29 RX ADMIN — TRAMADOL HYDROCHLORIDE 50 MG: 50 TABLET, COATED ORAL at 22:05

## 2023-12-29 RX ADMIN — INSULIN LISPRO 4 UNITS: 100 INJECTION, SOLUTION INTRAVENOUS; SUBCUTANEOUS at 11:47

## 2023-12-29 RX ADMIN — ATORVASTATIN CALCIUM 20 MG: 20 TABLET, FILM COATED ORAL at 09:26

## 2023-12-29 RX ADMIN — INSULIN GLARGINE 20 UNITS: 100 INJECTION, SOLUTION SUBCUTANEOUS at 11:44

## 2023-12-29 RX ADMIN — STANDARDIZED SENNA CONCENTRATE 17.2 MG: 8.6 TABLET ORAL at 20:22

## 2023-12-29 RX ADMIN — APIXABAN 2.5 MG: 5 TABLET, FILM COATED ORAL at 20:23

## 2023-12-29 RX ADMIN — PREDNISONE 40 MG: 20 TABLET ORAL at 20:22

## 2023-12-29 RX ADMIN — METOPROLOL SUCCINATE 50 MG: 50 TABLET, EXTENDED RELEASE ORAL at 09:26

## 2023-12-29 RX ADMIN — MONTELUKAST SODIUM 10 MG: 10 TABLET, FILM COATED ORAL at 09:29

## 2023-12-29 RX ADMIN — FUROSEMIDE 60 MG: 10 INJECTION, SOLUTION INTRAMUSCULAR; INTRAVENOUS at 00:21

## 2023-12-29 RX ADMIN — INSULIN GLARGINE 20 UNITS: 100 INJECTION, SOLUTION SUBCUTANEOUS at 20:23

## 2023-12-29 RX ADMIN — NITROGLYCERIN 0.4 MG: 0.4 TABLET SUBLINGUAL at 06:22

## 2023-12-29 SDOH — SOCIAL STABILITY: SOCIAL INSECURITY: ARE YOU OR HAVE YOU BEEN THREATENED OR ABUSED PHYSICALLY, EMOTIONALLY, OR SEXUALLY BY ANYONE?: NO

## 2023-12-29 SDOH — SOCIAL STABILITY: SOCIAL INSECURITY: DO YOU FEEL ANYONE HAS EXPLOITED OR TAKEN ADVANTAGE OF YOU FINANCIALLY OR OF YOUR PERSONAL PROPERTY?: NO

## 2023-12-29 SDOH — SOCIAL STABILITY: SOCIAL INSECURITY: ARE THERE ANY APPARENT SIGNS OF INJURIES/BEHAVIORS THAT COULD BE RELATED TO ABUSE/NEGLECT?: NO

## 2023-12-29 SDOH — SOCIAL STABILITY: SOCIAL INSECURITY: WERE YOU ABLE TO COMPLETE ALL THE BEHAVIORAL HEALTH SCREENINGS?: YES

## 2023-12-29 SDOH — SOCIAL STABILITY: SOCIAL INSECURITY: DOES ANYONE TRY TO KEEP YOU FROM HAVING/CONTACTING OTHER FRIENDS OR DOING THINGS OUTSIDE YOUR HOME?: NO

## 2023-12-29 SDOH — SOCIAL STABILITY: SOCIAL INSECURITY: ABUSE: ADULT

## 2023-12-29 SDOH — SOCIAL STABILITY: SOCIAL INSECURITY: DO YOU FEEL UNSAFE GOING BACK TO THE PLACE WHERE YOU ARE LIVING?: NO

## 2023-12-29 SDOH — SOCIAL STABILITY: SOCIAL INSECURITY: HAS ANYONE EVER THREATENED TO HURT YOUR FAMILY OR YOUR PETS?: NO

## 2023-12-29 SDOH — SOCIAL STABILITY: SOCIAL INSECURITY: HAVE YOU HAD THOUGHTS OF HARMING ANYONE ELSE?: NO

## 2023-12-29 ASSESSMENT — PAIN SCALES - GENERAL
PAINLEVEL_OUTOF10: 7
PAINLEVEL_OUTOF10: 0 - NO PAIN
PAINLEVEL_OUTOF10: 9
PAINLEVEL_OUTOF10: 9

## 2023-12-29 ASSESSMENT — ACTIVITIES OF DAILY LIVING (ADL)
GROOMING: INDEPENDENT
FEEDING YOURSELF: INDEPENDENT
HEARING - RIGHT EAR: FUNCTIONAL
DRESSING YOURSELF: INDEPENDENT
PATIENT'S MEMORY ADEQUATE TO SAFELY COMPLETE DAILY ACTIVITIES?: YES
TOILETING: INDEPENDENT
ADEQUATE_TO_COMPLETE_ADL: YES
WALKS IN HOME: INDEPENDENT
ASSISTIVE_DEVICE: EYEGLASSES
HEARING - LEFT EAR: FUNCTIONAL
BATHING: INDEPENDENT
LACK_OF_TRANSPORTATION: NO
JUDGMENT_ADEQUATE_SAFELY_COMPLETE_DAILY_ACTIVITIES: YES

## 2023-12-29 ASSESSMENT — COGNITIVE AND FUNCTIONAL STATUS - GENERAL
DAILY ACTIVITIY SCORE: 24
MOBILITY SCORE: 24
DAILY ACTIVITIY SCORE: 24
MOBILITY SCORE: 24
DAILY ACTIVITIY SCORE: 24
PATIENT BASELINE BEDBOUND: NO
MOBILITY SCORE: 24

## 2023-12-29 ASSESSMENT — PAIN - FUNCTIONAL ASSESSMENT
PAIN_FUNCTIONAL_ASSESSMENT: 0-10
PAIN_FUNCTIONAL_ASSESSMENT: NO/DENIES PAIN
PAIN_FUNCTIONAL_ASSESSMENT: 0-10

## 2023-12-29 ASSESSMENT — LIFESTYLE VARIABLES
AUDIT-C TOTAL SCORE: 0
HOW OFTEN DO YOU HAVE 6 OR MORE DRINKS ON ONE OCCASION: NEVER
HOW OFTEN DO YOU HAVE A DRINK CONTAINING ALCOHOL: NEVER
SKIP TO QUESTIONS 9-10: 1
AUDIT-C TOTAL SCORE: 0
HOW MANY STANDARD DRINKS CONTAINING ALCOHOL DO YOU HAVE ON A TYPICAL DAY: PATIENT DOES NOT DRINK

## 2023-12-29 ASSESSMENT — PATIENT HEALTH QUESTIONNAIRE - PHQ9
SUM OF ALL RESPONSES TO PHQ9 QUESTIONS 1 & 2: 0
1. LITTLE INTEREST OR PLEASURE IN DOING THINGS: NOT AT ALL
2. FEELING DOWN, DEPRESSED OR HOPELESS: NOT AT ALL

## 2023-12-29 NOTE — CONSULTS
[facility] Nephrology  Consult Note           Reason for Consult: Fluid overload missed dialysis  Requesting Physician:  Dr. Shields    Chief Complaint:  shortness of breath  History Obtained From:  patient, electronic medical record    History of Present Ilness:    38 y.o. year old female admitted with shortness of breath.  Patient has end-stage renal disease due to diabetes mellitus and hypertension.  She has had diabetes since the age of 10.  Started dialysis in June of this year in [place].  Moved out to [place].  Goes to [facility].  Sees Dr. Mclaughlin.  She has a PermCath in her chest.  Had an attempted AV fistula that did not work.  Had to have removed.  Her last dialysis was 12/22.  Her dry weight is 129.5.  She traveled and missed dialysis for the last week.  Comes in with shortness of breath.  Chest x-ray with interstitial edema.  Got 60 mg IV Lasix.    Past Medical History:    Past Medical History:   Diagnosis Date    Asthma     Diabetes mellitus (CMS/[facility])     Hyperlipemia     Hypertension         Past Surgical History:    History reviewed. No pertinent surgical history.     Home Medications:    No current facility-administered medications on file prior to encounter.     Current Outpatient Medications on File Prior to Encounter   Medication Sig Dispense Refill    albuterol 0.63 mg/3 mL nebulizer solution USE ONE VIAL IN NEBULIZER EVERY 6 HOURS AS NEEDED      Alcohol Prep Pads pads, medicated USE 3 PADS 3 TIMES A DAY      apixaban (Eliquis) 2.5 mg tablet Take 1 tablet (2.5 mg) by mouth 2 times a day.      atorvastatin (Lipitor) 10 mg tablet       atorvastatin (Lipitor) 20 mg tablet Take 1 tablet (20 mg) by mouth once daily. as directed      calcitriol (Rocaltrol) 0.5 mcg capsule Take 1 capsule (0.5 mcg) by mouth once daily.      cetirizine (ZyrTEC) 10 mg tablet Take 1 tablet (10 mg) by mouth once daily.      cyclobenzaprine (Flexeril) 10 mg tablet Take 1 tablet (10 mg) by mouth once daily.      ergocalciferol  "(Vitamin D-2) 1.25 MG (09370 UT) capsule Take 1 capsule (1,250 mcg) by mouth 1 (one) time per week.      ferrous sulfate 325 (65 Fe) MG tablet Take 1 tablet (325 mg) by mouth every other day.      Flovent HFA 44 mcg/actuation inhaler Inhale 2 puffs 2 times a day.      Lantus Solostar U-100 Insulin 100 unit/mL (3 mL) pen INJECT 42 UNITS SUBCUTANEOUSLY TWICE A DAY      medroxyPROGESTERone 150 mg/mL injection syringe USE AS DIRECTED INTRAMUSCULAR ONCE 90 DAYS      metoprolol succinate XL (Toprol-XL) 50 mg 24 hr tablet Take 1 tablet (50 mg) by mouth once daily. Do not crush or chew.      montelukast (Singulair) 10 mg tablet Take 1 tablet (10 mg) by mouth once daily.      NovoLOG FlexPen U-100 Insulin 100 unit/mL (3 mL) pen INJECT 5 UNITS SUBCUTANEOUSLY WITH BREAKFAST, 10 UNITS WITH LUNCH, AND 20 UNITS WITH DINNER      sevelamer carbonate (Renvela) 800 mg tablet Take 1 tablet (800 mg) by mouth 3 times a day with meals. Swallow tablet whole; do not crush, break, or chew.      sodium bicarbonate 650 mg tablet Take 2 tablets (1,300 mg) by mouth 2 times a day.      UltiCare Pen Needle 31 gauge x 1/4\" needle TEST 5 TIMES A DAY      Ventolin HFA 90 mcg/actuation inhaler Inhale 2 puffs 4 times a day as needed.      [DISCONTINUED] amLODIPine (Norvasc) 5 mg tablet Take 1 tablet (5 mg) by mouth once daily.      [DISCONTINUED] metoprolol succinate XL (Toprol-XL) 25 mg 24 hr tablet Take 1 tablet (25 mg) by mouth once daily.         Allergies:  Gabapentin, Mushroom, Percocet [oxycodone-acetaminophen], and Strawberry    Social History:    Social History     Socioeconomic History    Marital status: Single     Spouse name: Not on file    Number of children: Not on file    Years of education: Not on file    Highest education level: Not on file   Occupational History    Not on file   Tobacco Use    Smoking status: Former     Types: Cigarettes    Smokeless tobacco: Never   Substance and Sexual Activity    Alcohol use: Never    Drug use: " "Never    Sexual activity: Not on file   Other Topics Concern    Not on file   Social History Narrative    Not on file     Social Determinants of Health     Financial Resource Strain: Low Risk  (12/29/2023)    Overall Financial Resource Strain (CARDIA)     Difficulty of Paying Living Expenses: Not hard at all   Food Insecurity: Food Insecurity Present (10/20/2023)    Hunger Vital Sign     Worried About Running Out of Food in the Last Year: Sometimes true     Ran Out of Food in the Last Year: Often true   Transportation Needs: No Transportation Needs (12/29/2023)    PRAPARE - Transportation     Lack of Transportation (Medical): No     Lack of Transportation (Non-Medical): No   Physical Activity: Not on file   Stress: Not on file   Social Connections: Not on file   Intimate Partner Violence: Not on file   Housing Stability: High Risk (12/29/2023)    Housing Stability Vital Sign     Unable to Pay for Housing in the Last Year: No     Number of Places Lived in the Last Year: 2     Unstable Housing in the Last Year: Yes       Family History:   No family history on file.    Review of Systems:   Review of Systems      Physical exam:   Constitutional:  VITALS:  /85 (BP Location: Left arm, Patient Position: Lying)   Pulse 107   Temp 37.8 °C (100 °F) (Temporal)   Resp 16   Ht 1.702 m (5' 7\")   Wt 136 kg (299 lb)   LMP  (LMP Unknown)   SpO2 93%   BMI 46.83 kg/m²      Wt Readings from Last 3 Encounters:   12/29/23 136 kg (299 lb)   10/20/23 134 kg (295 lb 9.6 oz)      Gen: alert, awake, nad  Head: atraumatic, normocephalic  Skin: no rash, turgor wnl  Heent:  eomi, mmm  Neck: no bruits or jvd noted, thyroid normal  Lungs: Few rhonchi  Heart:  regular rate and rhythm, no murmurs  Abdomen:  +bs, soft, nt, nd, no hepatosplenomegaly   Extremities: no edema  Psychiatric: mood and affect appropriate; judgement and insight intact  Musculoskeletal:  Rom, muscular strength intact; digits, nails normal    Data/  CBC:   Lab " "Results   Component Value Date    WBC 11.0 12/28/2023    RBC 3.76 (L) 12/28/2023    HGB 10.4 (L) 12/28/2023    HCT 33.4 (L) 12/28/2023    MCV 89 12/28/2023    MCH 27.7 12/28/2023    MCHC 31.1 (L) 12/28/2023    RDW 14.4 12/28/2023     12/28/2023     BMP:    Lab Results   Component Value Date     12/28/2023    K 4.3 12/28/2023     12/28/2023    CO2 19 (L) 12/28/2023    BUN 71 (H) 12/28/2023    CREATININE 8.94 (H) 12/28/2023    CALCIUM 7.7 (L) 12/28/2023    GLUCOSE 106 (H) 12/28/2023     ECG 12 lead  Normal sinus rhythm  Normal ECG  When compared with ECG of 29-DEC-2023 01:37, (unconfirmed)  No significant change was found  Confirmed by Rudi Barr ([id number]) on 12/29/2023 7:53:58 AM  ECG 12 Lead  Normal sinus rhythm  Normal ECG  When compared with ECG of 28-DEC-2023 22:58, (unconfirmed)  No significant change was found  Confirmed by Rudi Barr ([id number]) on 12/29/2023 7:53:51 AM  ECG 12 lead  Normal sinus rhythm  Prolonged QT  Abnormal ECG  No previous ECGs available  See ED provider note for full interpretation and clinical correlation    LFT:   Lab Results   Component Value Date    AST 9 12/28/2023    ALT 6 (L) 12/28/2023    ALKPHOS 77 12/28/2023    BILITOT 0.4 12/28/2023      Urinalysis: No results found for: \"LAUREN\", \"PROTUR\", \"GLUCOSEU\", \"BLOODU\", \"KETONESU\", \"BILIRUBINU\", \"NITRITEU\", \"LEUKOCYTESU\", \"UTPCR\"   Imaging: ECG 12 lead  Normal sinus rhythm  Normal ECG  When compared with ECG of 29-DEC-2023 01:37, (unconfirmed)  No significant change was found  Confirmed by Rudi Barr ([id number]) on 12/29/2023 7:53:58 AM  ECG 12 Lead  Normal sinus rhythm  Normal ECG  When compared with ECG of 28-DEC-2023 22:58, (unconfirmed)  No significant change was found  Confirmed by Rudi Barr ([id number]) on 12/29/2023 7:53:51 AM  ECG 12 lead  Normal sinus rhythm  Prolonged QT  Abnormal ECG  No previous ECGs available  See ED provider note for full interpretation and clinical correlation     "       Assessment/  38 y.o. yo female admitted with shortness of breath.  She has end-stage renal disease due to diabetes mellitus and hypertension.  Started dialysis in June 2023.  Access is a right sided PermCath.  Had attempted at AV fistula but that had to be taken down.  Has been referred back to vascular.  She missed dialysis for the last week due to travel.        Plan/  Hemodialysis today with about 3 L fluid removal  I called the dialysis unit to update them.  I also discussed with the patient in detail that if she is going to travel she needs to let the dialysis unit know ahead of time so they can arrange dialysis wherever she would be going.  Explained dangers of missing dialysis.  Next dialysis will be Sunday  Could be discharged at any point from a kidney standpoint  Outpatient follow up from renal standpoint: Dr. Mclaughlin    Thank you for the consultation.  Please do not hesitate to call with questions.    Jose L Vega MD

## 2023-12-29 NOTE — SIGNIFICANT EVENT
Seen and examined. Still having some pleuritic chest pain. Some MSK component as well, as reproducible on palpation on exam. Will add lido patch. Nephro consulted, plan for HD today. Will reassess CP after HD. May do CTA chest if persistent pleuritic chest pain given her very recent PE history. Possible dc to home later today vs tomorrow.

## 2023-12-29 NOTE — PROGRESS NOTES
Plan is for home no needs. Patient will resume established HD on MWF at [facility] in [place] on discharge. Self transports.

## 2023-12-29 NOTE — ED PROVIDER NOTES
HPI   Chief Complaint   Patient presents with    Shortness of Breath       This is a pleasant 38-year-old female who is a dialysis patient and is supposed to be dialyzed every Monday Wednesday Friday states that she missed her dialysis on Monday and Wednesday because she was in [place] visiting family.  Patient states that she had called ahead and was told by the dialysis center in [place] that they could fit her in but then when she went there they told her they had no openings.  The patient reports that she started feeling really short of breath this afternoon and is scheduled for dialysis tomorrow.  She denies any fevers, chills, chest pain, heart palpitations, cough or hemoptysis.  Shortness of breath is worse with exertion.  Patient states she normally gets dialyzed at the [facility].  She denies any hemoptysis night sweats.                          [place] Coma Scale Score: 15                  Patient History   Past Medical History:   Diagnosis Date    Asthma     Diabetes mellitus (CMS/HCC)     Hyperlipemia     Hypertension      History reviewed. No pertinent surgical history.  No family history on file.  Social History     Tobacco Use    Smoking status: Former     Types: Cigarettes    Smokeless tobacco: Never   Substance Use Topics    Alcohol use: Never    Drug use: Never       Physical Exam   ED Triage Vitals [12/28/23 2214]   Temp Heart Rate Resp BP   36 °C (96.8 °F) 101 20 (!) 187/92      SpO2 Temp Source Heart Rate Source Patient Position   100 % Temporal -- Sitting      BP Location FiO2 (%)     Right arm --       Physical Exam  Vitals and nursing note reviewed.   Constitutional:       Appearance: Normal appearance. She is well-developed, well-groomed and overweight.   HENT:      Head: Normocephalic and atraumatic.      Right Ear: Tympanic membrane, ear canal and external ear normal.      Left Ear: Tympanic membrane, ear canal and external ear normal.      Nose: Nose normal.      Mouth/Throat:       Mouth: Mucous membranes are moist.      Pharynx: Oropharynx is clear.   Eyes:      Extraocular Movements: Extraocular movements intact.      Conjunctiva/sclera: Conjunctivae normal.      Pupils: Pupils are equal, round, and reactive to light.   Cardiovascular:      Rate and Rhythm: Normal rate and regular rhythm.      Pulses: Normal pulses.      Heart sounds: Normal heart sounds.   Pulmonary:      Effort: Pulmonary effort is normal.      Breath sounds: Examination of the right-upper field reveals decreased breath sounds. Examination of the left-upper field reveals decreased breath sounds. Examination of the right-middle field reveals decreased breath sounds. Examination of the left-middle field reveals decreased breath sounds. Examination of the right-lower field reveals decreased breath sounds. Examination of the left-lower field reveals decreased breath sounds. Decreased breath sounds present. No wheezing, rhonchi or rales.   Abdominal:      General: Abdomen is flat. Bowel sounds are normal.      Palpations: Abdomen is soft. There is no mass.      Tenderness: There is no abdominal tenderness. There is no guarding.   Musculoskeletal:         General: No swelling or tenderness. Normal range of motion.      Cervical back: Normal range of motion and neck supple.      Right lower leg: No edema.      Left lower leg: No edema.   Skin:     General: Skin is warm and dry.      Capillary Refill: Capillary refill takes less than 2 seconds.      Findings: No rash.   Neurological:      General: No focal deficit present.      Mental Status: She is alert and oriented to person, place, and time. Mental status is at baseline.   Psychiatric:         Mood and Affect: Mood normal.         Behavior: Behavior normal.         Thought Content: Thought content normal.         Judgment: Judgment normal.         ED Course & [facility]   ED Course as of 12/28/23 2353   Thu Dec 28, 2023   4117 POTASSIUM: 4.3 [NW]      ED Course User Index  [NW]  Geraldo Boss, DO         Diagnoses as of 12/28/23 2353   Shortness of breath   ESRD (end stage renal disease) on dialysis (CMS/[facility])       Medical Decision Making  ED course temperature is 36 pulse 101 respirations 20 /92, pulse ox is 100% on room air  I have ordered baseline lab work and a portable chest x-ray.    Patient's lab work CBC white count 11 hemoglobin 10.4 hematocrit 33.4 platelet count was 306, BNP 84 troponin 13 glucose 106 bicarb 19 BUN 71 creatinine 8.94 with a GFR 5 calcium 7.7 ALT of 6.  Chest x-ray shows borderline cardiomegaly with findings suggestive of mild edema.  We did ambulate the patient on room air and her sats dropped to 90% she became very short of breath and had to sit down.  The patient will therefore be admitted.  I did speak with Dr. Mercado who recommends giving her 60 mg of Lasix IV and they will have her dialyzed in the morning.    EKG interpreted by me at 2258 hrs. normal sinus rhythm rate 96  QRS was 76 QT was 384 QTc was 45 no ischemic changes.            Procedure  Procedures     Rudi Natarajan PA-C  12/28/23 [id number]       Rudi Natarajan PA-C  12/29/23 0007

## 2023-12-29 NOTE — H&P
Medical Group History and Physical    ASSESSMENT/PLAN:       Shortness of breath  Volume overload  ESRD  -missed last 2 HD sessions  -CXR with some interstitial edema  -stable on room air currently  -still makes urine, got 60mg IV lasix in ED with some response  -nephro consult for HD in am    HTN urgency  -resume home antihypertensives    Chest tightness  -may be 2/2 above, however will repeat trops, serial EKGs; at this point doubt ACS    Asthma  -PRN nebs, does not appear to be in exacerbation    DM  -correctional insulin  -resume home basal once dose verified    Hx VTE  -on eliquis, continue      VTE Prophylaxis: eliquis      HISTORY OF PRESENT ILLNESS:   Chief Complaint: Shortness of breath    History Of Present Illness:    Alexus Haley is a 38 y.o. female with a significant past medical history of ESRD on regular hemodialysis, insulin-dependent diabetes, hypertension, asthma, history of PE on Eliquis, who is presenting to emergency department shortness of breath.  Shortness of breath began yesterday.  She says that she was out of town and had arranged to have a hemodialysis at a facility there but apparently there was a mixup and she was not able to get it so her last session was last Friday.  Denies any cough or fever.  She had not been having any chest discomfort until actually she got here.  She says she developed some chest tightness when she was walking in the emergency room.  She has never had similar symptoms previously and denies any prior cardiovascular disease.  Denies any palpitations.  No abdominal pain, nausea vomiting, diarrhea.  She still does make some urine.  Denies any dysuria or frequency.       Review of systems: 10 point review of systems is otherwise negative except as mentioned above.    PAST HISTORIES:       Past Medical History:  She has a past medical history of Asthma, Diabetes mellitus (CMS/HCC), Hyperlipemia, and Hypertension.    Past Surgical History:  She has no past surgical  history on file.      Social History:  She reports that she has quit smoking. Her smoking use included cigarettes. She has never used smokeless tobacco. She reports that she does not drink alcohol and does not use drugs.    Family History:  No family history on file.     Allergies:  Gabapentin, Mushroom, Percocet [oxycodone-acetaminophen], and Strawberry      OBJECTIVE:       Last Recorded Vitals:  Vitals:    12/28/23 2343 12/29/23 0029 12/29/23 0058 12/29/23 0200   BP:  138/72 (!) 182/95    BP Location:  Left arm     Patient Position:  Sitting     Pulse:  88 96    Resp:  17     Temp:   36.2 °C (97.2 °F)    TempSrc:       SpO2: 90% 95% 100%    Weight:    136 kg (299 lb)   Height:           Last I/O:  No intake/output data recorded.    Physical Exam  Vitals reviewed.   Constitutional:       Appearance: Normal appearance. She is obese.   HENT:      Head: Normocephalic and atraumatic.      Nose: Nose normal.      Mouth/Throat:      Mouth: Mucous membranes are moist.      Pharynx: Oropharynx is clear.   Eyes:      Extraocular Movements: Extraocular movements intact.      Conjunctiva/sclera: Conjunctivae normal.      Pupils: Pupils are equal, round, and reactive to light.   Cardiovascular:      Rate and Rhythm: Regular rhythm.      Heart sounds: No murmur heard.     No gallop.      Comments: R chest wall TDC  Pulmonary:      Effort: Pulmonary effort is normal. No respiratory distress.      Breath sounds: No wheezing, rhonchi or rales.      Comments: Diminished bilaterally; no clear wheeze or crackles  Abdominal:      General: Abdomen is flat. Bowel sounds are normal. There is no distension.      Palpations: Abdomen is soft.      Tenderness: There is no abdominal tenderness. There is no guarding or rebound.   Musculoskeletal:         General: Normal range of motion.   Skin:     General: Skin is warm and dry.   Neurological:      General: No focal deficit present.      Mental Status: She is alert and oriented to person,  place, and time.      Cranial Nerves: No cranial nerve deficit.      Sensory: No sensory deficit.      Motor: No weakness.   Psychiatric:         Mood and Affect: Mood normal.         Behavior: Behavior normal.           Inpatient Medications:  apixaban, 2.5 mg, oral, BID  atorvastatin, 20 mg, oral, Daily  insulin lispro, 0-10 Units, subcutaneous, TID with meals  metoprolol succinate XL, 25 mg, oral, Daily  sennosides, 2 tablet, oral, BID      PRN medications: albuterol, dextrose 10 % in water (D10W), dextrose, glucagon    Outpatient Medications:  Prior to Admission medications    Medication Sig Start Date End Date Taking? Authorizing Provider   albuterol 0.63 mg/3 mL nebulizer solution USE ONE VIAL IN NEBULIZER EVERY 6 HOURS AS NEEDED 1/4/23   Historical Provider, MD   Alcohol Prep Pads pads, medicated USE 3 PADS 3 TIMES A DAY 2/22/23   Historical Provider, MD   apixaban (Eliquis) 2.5 mg tablet Take 1 tablet (2.5 mg) by mouth 2 times a day.    Historical Provider, MD   atorvastatin (Lipitor) 10 mg tablet  8/21/23   Historical Provider, MD   atorvastatin (Lipitor) 20 mg tablet Take 1 tablet (20 mg) by mouth once daily. as directed 2/9/23   Historical Provider, MD   calcitriol (Rocaltrol) 0.5 mcg capsule Take 1 capsule (0.5 mcg) by mouth once daily. 9/15/23   Historical Provider, MD   cetirizine (ZyrTEC) 10 mg tablet Take 1 tablet (10 mg) by mouth once daily. 1/2/23   Historical Provider, MD   cyclobenzaprine (Flexeril) 10 mg tablet Take 1 tablet (10 mg) by mouth once daily.    Historical Provider, MD   ergocalciferol (Vitamin D-2) 1.25 MG (61351 UT) capsule Take 1 capsule (1,250 mcg) by mouth 1 (one) time per week. 5/31/23   Historical Provider, MD   ferrous sulfate 325 (65 Fe) MG tablet Take 1 tablet (325 mg) by mouth every other day. 12/15/22   Historical Provider, MD   Flovent HFA 44 mcg/actuation inhaler Inhale 2 puffs 2 times a day. 2/22/23   Historical Provider, MD Dilshad Guerra U-100 Insulin 100 unit/mL  "(3 mL) pen INJECT 42 UNITS SUBCUTANEOUSLY TWICE A DAY 9/15/23   Historical Provider, MD   medroxyPROGESTERone 150 mg/mL injection syringe USE AS DIRECTED INTRAMUSCULAR ONCE 90 DAYS 9/14/23   Historical Provider, MD   metoprolol succinate XL (Toprol-XL) 50 mg 24 hr tablet Take 1 tablet (50 mg) by mouth once daily. Do not crush or chew.    Historical Provider, MD   montelukast (Singulair) 10 mg tablet Take 1 tablet (10 mg) by mouth once daily. 9/15/23   Historical Provider, MD   NovoLOG FlexPen U-100 Insulin 100 unit/mL (3 mL) pen INJECT 5 UNITS SUBCUTANEOUSLY WITH BREAKFAST, 10 UNITS WITH LUNCH, AND 20 UNITS WITH DINNER 9/15/23   Historical Provider, MD   sevelamer carbonate (Renvela) 800 mg tablet Take 1 tablet (800 mg) by mouth 3 times a day with meals. Swallow tablet whole; do not crush, break, or chew.    Historical Provider, MD   sodium bicarbonate 650 mg tablet Take 2 tablets (1,300 mg) by mouth 2 times a day. 6/22/23   Historical Provider, MD   UltiCare Pen Needle 31 gauge x 1/4\" needle TEST 5 TIMES A DAY 2/22/23   Historical Provider, MD   Ventolin HFA 90 mcg/actuation inhaler Inhale 2 puffs 4 times a day as needed. 2/22/23   Historical Provider, MD   amLODIPine (Norvasc) 5 mg tablet Take 1 tablet (5 mg) by mouth once daily. 6/22/23 7/22/23  Historical Provider, MD   metoprolol succinate XL (Toprol-XL) 25 mg 24 hr tablet Take 1 tablet (25 mg) by mouth once daily. 9/15/23 12/29/23  Historical Provider, MD       LABS AND IMAGING:     Last Labs:  CBC - 12/28/2023: 10:40 PM  11.0 10.4 306    33.4      CMP - 12/28/2023: 10:40 PM  7.7 7.4 9 --- 0.4   5.2 3.7 6 77      PTT - No results in last year.  _   _ _     Troponin I, High Sensitivity   Date/Time Value Ref Range Status   12/28/2023 10:40 PM 13 0 - 13 ng/L Final     BNP   Date/Time Value Ref Range Status   12/28/2023 10:40 PM 84 0 - 99 pg/mL Final     Hemoglobin A1C   Date/Time Value Ref Range Status   10/20/2023 10:33 AM 13.1 (H) see below % Final        "

## 2023-12-29 NOTE — ED TRIAGE NOTES
PT. ARRIVED VIA PRIVATE CAR, DROVE SELF, TO ED FROM HOME FOR SOB. PT. STATES SHE MISSED X2 DIALYSIS APPOINTMENTS. PT. STATES SHE WENT TO [place] ON VACATION TO VISIT FAMILY, CALLED AHEAD TO DIALYSIS CENTER AND SET UP APPOINTMENTS FOR M/W. PT. STATES WHEN SHE GOT THERE THEY TOLD HER THEY DIDN'T HAVE ANY OPENINGS AND COULD NOT RECEIVE DIALYSIS. PT. STATES SHE CAN GO TO HER NORMAL DIALYSIS CENTER TOMORROW BUT IS HAVING INCREASED SOB.

## 2023-12-30 ENCOUNTER — APPOINTMENT (OUTPATIENT)
Dept: CARDIOLOGY | Facility: HOSPITAL | Age: 38
DRG: 640 | End: 2023-12-30
Payer: MEDICARE

## 2023-12-30 PROBLEM — R06.02 SHORTNESS OF BREATH: Status: ACTIVE | Noted: 2023-12-30

## 2023-12-30 LAB
ANION GAP SERPL CALC-SCNC: 16 MMOL/L (ref 10–20)
ANION GAP SERPL CALC-SCNC: 17 MMOL/L (ref 10–20)
B-OH-BUTYR SERPL-SCNC: 0.08 MMOL/L (ref 0.02–0.27)
BUN SERPL-MCNC: 53 MG/DL (ref 6–23)
BUN SERPL-MCNC: 65 MG/DL (ref 6–23)
CALCIUM SERPL-MCNC: 7.5 MG/DL (ref 8.6–10.3)
CALCIUM SERPL-MCNC: 7.5 MG/DL (ref 8.6–10.3)
CHLORIDE SERPL-SCNC: 94 MMOL/L (ref 98–107)
CHLORIDE SERPL-SCNC: 96 MMOL/L (ref 98–107)
CO2 SERPL-SCNC: 19 MMOL/L (ref 21–32)
CO2 SERPL-SCNC: 21 MMOL/L (ref 21–32)
CREAT SERPL-MCNC: 7.39 MG/DL (ref 0.5–1.05)
CREAT SERPL-MCNC: 8.39 MG/DL (ref 0.5–1.05)
ERYTHROCYTE [DISTWIDTH] IN BLOOD BY AUTOMATED COUNT: 14.3 % (ref 11.5–14.5)
FERRITIN SERPL-MCNC: 1244 NG/ML (ref 8–150)
GFR SERPL CREATININE-BSD FRML MDRD: 6 ML/MIN/1.73M*2
GFR SERPL CREATININE-BSD FRML MDRD: 7 ML/MIN/1.73M*2
GLUCOSE BLD MANUAL STRIP-MCNC: 344 MG/DL (ref 74–99)
GLUCOSE BLD MANUAL STRIP-MCNC: 399 MG/DL (ref 74–99)
GLUCOSE BLD MANUAL STRIP-MCNC: 445 MG/DL (ref 74–99)
GLUCOSE BLD MANUAL STRIP-MCNC: 451 MG/DL (ref 74–99)
GLUCOSE BLD MANUAL STRIP-MCNC: 466 MG/DL (ref 74–99)
GLUCOSE BLD MANUAL STRIP-MCNC: 486 MG/DL (ref 74–99)
GLUCOSE SERPL-MCNC: 427 MG/DL (ref 74–99)
GLUCOSE SERPL-MCNC: 455 MG/DL (ref 74–99)
HCT VFR BLD AUTO: 33 % (ref 36–46)
HGB BLD-MCNC: 10.4 G/DL (ref 12–16)
HGB RETIC QN: 29 PG (ref 28–38)
HOLD SPECIMEN: NORMAL
HOLD SPECIMEN: NORMAL
IMMATURE RETIC FRACTION: 18.5 %
IRON SATN MFR SERPL: 36 % (ref 25–45)
IRON SERPL-MCNC: 98 UG/DL (ref 35–150)
MCH RBC QN AUTO: 27.5 PG (ref 26–34)
MCHC RBC AUTO-ENTMCNC: 31.5 G/DL (ref 32–36)
MCV RBC AUTO: 87 FL (ref 80–100)
NRBC BLD-RTO: 0 /100 WBCS (ref 0–0)
PLATELET # BLD AUTO: 196 X10*3/UL (ref 150–450)
POTASSIUM SERPL-SCNC: 5.3 MMOL/L (ref 3.5–5.3)
POTASSIUM SERPL-SCNC: 5.7 MMOL/L (ref 3.5–5.3)
RBC # BLD AUTO: 3.78 X10*6/UL (ref 4–5.2)
RETICS #: 0.09 X10*6/UL (ref 0.02–0.08)
RETICS/RBC NFR AUTO: 2.3 % (ref 0.5–2)
SODIUM SERPL-SCNC: 125 MMOL/L (ref 136–145)
SODIUM SERPL-SCNC: 127 MMOL/L (ref 136–145)
TIBC SERPL-MCNC: 275 UG/DL (ref 240–445)
UFH PPP CHRO-ACNC: 0.9 IU/ML
UFH PPP CHRO-ACNC: 1.2 IU/ML
UIBC SERPL-MCNC: 177 UG/DL (ref 110–370)
WBC # BLD AUTO: 8.8 X10*3/UL (ref 4.4–11.3)

## 2023-12-30 PROCEDURE — 82728 ASSAY OF FERRITIN: CPT | Mod: ELYLAB | Performed by: INTERNAL MEDICINE

## 2023-12-30 PROCEDURE — 2500000002 HC RX 250 W HCPCS SELF ADMINISTERED DRUGS (ALT 637 FOR MEDICARE OP, ALT 636 FOR OP/ED): Performed by: INTERNAL MEDICINE

## 2023-12-30 PROCEDURE — 36415 COLL VENOUS BLD VENIPUNCTURE: CPT | Performed by: INTERNAL MEDICINE

## 2023-12-30 PROCEDURE — 1210000001 HC SEMI-PRIVATE ROOM DAILY

## 2023-12-30 PROCEDURE — 2500000005 HC RX 250 GENERAL PHARMACY W/O HCPCS: Performed by: INTERNAL MEDICINE

## 2023-12-30 PROCEDURE — 85027 COMPLETE CBC AUTOMATED: CPT | Performed by: INTERNAL MEDICINE

## 2023-12-30 PROCEDURE — 80048 BASIC METABOLIC PNL TOTAL CA: CPT | Performed by: INTERNAL MEDICINE

## 2023-12-30 PROCEDURE — 99233 SBSQ HOSP IP/OBS HIGH 50: CPT | Performed by: INTERNAL MEDICINE

## 2023-12-30 PROCEDURE — 2500000004 HC RX 250 GENERAL PHARMACY W/ HCPCS (ALT 636 FOR OP/ED): Performed by: INTERNAL MEDICINE

## 2023-12-30 PROCEDURE — 82374 ASSAY BLOOD CARBON DIOXIDE: CPT | Performed by: INTERNAL MEDICINE

## 2023-12-30 PROCEDURE — 85520 HEPARIN ASSAY: CPT | Performed by: INTERNAL MEDICINE

## 2023-12-30 PROCEDURE — 93005 ELECTROCARDIOGRAM TRACING: CPT

## 2023-12-30 PROCEDURE — 94640 AIRWAY INHALATION TREATMENT: CPT

## 2023-12-30 PROCEDURE — 83540 ASSAY OF IRON: CPT | Performed by: INTERNAL MEDICINE

## 2023-12-30 PROCEDURE — 2500000001 HC RX 250 WO HCPCS SELF ADMINISTERED DRUGS (ALT 637 FOR MEDICARE OP): Performed by: INTERNAL MEDICINE

## 2023-12-30 PROCEDURE — 93010 ELECTROCARDIOGRAM REPORT: CPT | Performed by: INTERNAL MEDICINE

## 2023-12-30 PROCEDURE — 82947 ASSAY GLUCOSE BLOOD QUANT: CPT

## 2023-12-30 PROCEDURE — 82010 KETONE BODYS QUAN: CPT | Performed by: INTERNAL MEDICINE

## 2023-12-30 PROCEDURE — 85045 AUTOMATED RETICULOCYTE COUNT: CPT | Performed by: INTERNAL MEDICINE

## 2023-12-30 PROCEDURE — 2500000001 HC RX 250 WO HCPCS SELF ADMINISTERED DRUGS (ALT 637 FOR MEDICARE OP): Performed by: NURSE PRACTITIONER

## 2023-12-30 RX ORDER — HYDROCODONE BITARTRATE AND ACETAMINOPHEN 5; 325 MG/1; MG/1
1 TABLET ORAL EVERY 6 HOURS PRN
Status: DISCONTINUED | OUTPATIENT
Start: 2023-12-30 | End: 2024-01-01 | Stop reason: HOSPADM

## 2023-12-30 RX ORDER — INSULIN LISPRO 100 [IU]/ML
15 INJECTION, SOLUTION INTRAVENOUS; SUBCUTANEOUS ONCE
Status: COMPLETED | OUTPATIENT
Start: 2023-12-31 | End: 2023-12-31

## 2023-12-30 RX ORDER — INSULIN GLARGINE 100 [IU]/ML
15 INJECTION, SOLUTION SUBCUTANEOUS ONCE
Status: COMPLETED | OUTPATIENT
Start: 2023-12-30 | End: 2023-12-30

## 2023-12-30 RX ORDER — INSULIN LISPRO 100 [IU]/ML
20 INJECTION, SOLUTION INTRAVENOUS; SUBCUTANEOUS ONCE
Status: COMPLETED | OUTPATIENT
Start: 2023-12-30 | End: 2023-12-30

## 2023-12-30 RX ORDER — HYDROCODONE BITARTRATE AND ACETAMINOPHEN 5; 325 MG/1; MG/1
1 TABLET ORAL ONCE
Status: COMPLETED | OUTPATIENT
Start: 2023-12-30 | End: 2023-12-30

## 2023-12-30 RX ORDER — ONDANSETRON HYDROCHLORIDE 2 MG/ML
4 INJECTION, SOLUTION INTRAVENOUS EVERY 6 HOURS PRN
Status: DISCONTINUED | OUTPATIENT
Start: 2023-12-30 | End: 2024-01-01 | Stop reason: HOSPADM

## 2023-12-30 RX ORDER — IPRATROPIUM BROMIDE AND ALBUTEROL SULFATE 2.5; .5 MG/3ML; MG/3ML
3 SOLUTION RESPIRATORY (INHALATION) EVERY 6 HOURS
Status: DISCONTINUED | OUTPATIENT
Start: 2023-12-30 | End: 2024-01-01 | Stop reason: HOSPADM

## 2023-12-30 RX ORDER — INSULIN GLARGINE 100 [IU]/ML
42 INJECTION, SOLUTION SUBCUTANEOUS 2 TIMES DAILY
Status: DISCONTINUED | OUTPATIENT
Start: 2023-12-30 | End: 2024-01-01 | Stop reason: HOSPADM

## 2023-12-30 RX ORDER — CYCLOBENZAPRINE HCL 10 MG
10 TABLET ORAL DAILY
Status: DISCONTINUED | OUTPATIENT
Start: 2023-12-30 | End: 2024-01-01 | Stop reason: HOSPADM

## 2023-12-30 RX ORDER — INSULIN LISPRO 100 [IU]/ML
15 INJECTION, SOLUTION INTRAVENOUS; SUBCUTANEOUS ONCE
Status: COMPLETED | OUTPATIENT
Start: 2023-12-30 | End: 2023-12-30

## 2023-12-30 RX ORDER — LORATADINE 10 MG/1
10 TABLET ORAL DAILY
Status: DISCONTINUED | OUTPATIENT
Start: 2023-12-30 | End: 2024-01-01 | Stop reason: HOSPADM

## 2023-12-30 RX ADMIN — INSULIN LISPRO 8 UNITS: 100 INJECTION, SOLUTION INTRAVENOUS; SUBCUTANEOUS at 08:01

## 2023-12-30 RX ADMIN — INSULIN GLARGINE 42 UNITS: 100 INJECTION, SOLUTION SUBCUTANEOUS at 09:40

## 2023-12-30 RX ADMIN — METOPROLOL SUCCINATE 50 MG: 50 TABLET, EXTENDED RELEASE ORAL at 08:02

## 2023-12-30 RX ADMIN — MOMETASONE FUROATE 1 PUFF: 220 INHALANT RESPIRATORY (INHALATION) at 15:13

## 2023-12-30 RX ADMIN — INSULIN GLARGINE 15 UNITS: 100 INJECTION, SOLUTION SUBCUTANEOUS at 17:58

## 2023-12-30 RX ADMIN — IPRATROPIUM BROMIDE AND ALBUTEROL SULFATE 3 ML: 2.5; .5 SOLUTION RESPIRATORY (INHALATION) at 14:14

## 2023-12-30 RX ADMIN — CYCLOBENZAPRINE HYDROCHLORIDE 10 MG: 10 TABLET, FILM COATED ORAL at 13:56

## 2023-12-30 RX ADMIN — ONDANSETRON 4 MG: 2 INJECTION INTRAMUSCULAR; INTRAVENOUS at 03:55

## 2023-12-30 RX ADMIN — STANDARDIZED SENNA CONCENTRATE 17.2 MG: 8.6 TABLET ORAL at 20:43

## 2023-12-30 RX ADMIN — INSULIN LISPRO 15 UNITS: 100 INJECTION, SOLUTION INTRAVENOUS; SUBCUTANEOUS at 22:25

## 2023-12-30 RX ADMIN — IPRATROPIUM BROMIDE AND ALBUTEROL SULFATE 3 ML: 2.5; .5 SOLUTION RESPIRATORY (INHALATION) at 21:10

## 2023-12-30 RX ADMIN — INSULIN LISPRO 20 UNITS: 100 INJECTION, SOLUTION INTRAVENOUS; SUBCUTANEOUS at 12:03

## 2023-12-30 RX ADMIN — HYDROMORPHONE HYDROCHLORIDE 0.4 MG: 1 INJECTION, SOLUTION INTRAMUSCULAR; INTRAVENOUS; SUBCUTANEOUS at 09:07

## 2023-12-30 RX ADMIN — INSULIN LISPRO 10 UNITS: 100 INJECTION, SOLUTION INTRAVENOUS; SUBCUTANEOUS at 19:30

## 2023-12-30 RX ADMIN — APIXABAN 5 MG: 5 TABLET, FILM COATED ORAL at 21:00

## 2023-12-30 RX ADMIN — PREDNISONE 40 MG: 20 TABLET ORAL at 08:01

## 2023-12-30 RX ADMIN — INSULIN GLARGINE 42 UNITS: 100 INJECTION, SOLUTION SUBCUTANEOUS at 21:00

## 2023-12-30 RX ADMIN — INSULIN LISPRO 20 UNITS: 100 INJECTION, SOLUTION INTRAVENOUS; SUBCUTANEOUS at 17:13

## 2023-12-30 RX ADMIN — APIXABAN 5 MG: 5 TABLET, FILM COATED ORAL at 13:07

## 2023-12-30 RX ADMIN — HYDROCODONE BITARTRATE AND ACETAMINOPHEN 1 TABLET: 5; 325 TABLET ORAL at 20:43

## 2023-12-30 RX ADMIN — LORATADINE 10 MG: 10 TABLET ORAL at 13:56

## 2023-12-30 RX ADMIN — ATORVASTATIN CALCIUM 20 MG: 20 TABLET, FILM COATED ORAL at 08:01

## 2023-12-30 RX ADMIN — LIDOCAINE 1 PATCH: 4 PATCH TOPICAL at 08:02

## 2023-12-30 RX ADMIN — MONTELUKAST SODIUM 10 MG: 10 TABLET, FILM COATED ORAL at 08:01

## 2023-12-30 RX ADMIN — HYDROCODONE BITARTRATE AND ACETAMINOPHEN 1 TABLET: 5; 325 TABLET ORAL at 01:40

## 2023-12-30 RX ADMIN — STANDARDIZED SENNA CONCENTRATE 17.2 MG: 8.6 TABLET ORAL at 08:02

## 2023-12-30 ASSESSMENT — PAIN DESCRIPTION - LOCATION
LOCATION: CHEST
LOCATION: CHEST

## 2023-12-30 ASSESSMENT — COGNITIVE AND FUNCTIONAL STATUS - GENERAL
DAILY ACTIVITIY SCORE: 24
MOBILITY SCORE: 24

## 2023-12-30 ASSESSMENT — PAIN - FUNCTIONAL ASSESSMENT
PAIN_FUNCTIONAL_ASSESSMENT: 0-10

## 2023-12-30 ASSESSMENT — PAIN SCALES - GENERAL
PAINLEVEL_OUTOF10: 8
PAINLEVEL_OUTOF10: 10 - WORST POSSIBLE PAIN
PAINLEVEL_OUTOF10: 10 - WORST POSSIBLE PAIN
PAINLEVEL_OUTOF10: 4

## 2023-12-30 NOTE — PROGRESS NOTES
ASSESSMENT & PLAN:     Chest tightness  Possible asthma exacerbation  - serial trops normal, ecgs with nsr, not ACS  - doubt related to chronic segmental PE as described below  - seems more msk to me, does also sound tight with poor air movement, maybe 2/2 asthma exacerbation as well  Plan:  - will cont topical lido patch  - resume home ICS inhaler, start scheduled nebs, tried PO prednisone however BG became very uncontrolled, will hold off on further    Hx of PE on Eliquis  - was diagnosed at recent admission at [facility] on 10/25/23 in segmental branch of RLL, confirmed with OSH records faxed over  - she was on Eliquis 5mg BID, which was dose reduced to 2.5mg BID after a repeat admission for menorrhagia that did not require blood transfusion. She has since had IUD by gynecology placed, and only has spotting now.   - she had repeat CTA chest done here, that is read as acute PE in segmental branch of RLL. I do not believe this represents new acute PE, and is rather existing thrombus from prior diagnosis given same location  - no R heart strain on CT, normal biomarkers, no need for TTE either  Plan:  - will resume Eliquis, but at 5mg BID dose since her uterine bleeding is now controlled, her Hgb is stable, and she has indication for full dose Eliquis with recent VTE    Anemia of renal disease  - Hgb at baseline, no active bleeding, her uterine bleeding has resolved since IUD placement  - will add on iron labs, but suspect she has baseline anemia due to her ESRD    DM1  - resume home insulin, currently uncontrolled d/t prednisone which is now stopped    ESRD on HD  - via R sided TDC, follows with Dr. Mclaughlin  - presented with 2 missed sessions due to travel, had volume overload, now improved  - nephrology following for HD needs, next tomorrow    Hypertensive urgency  - improved with HD, resume home meds    Morbid obesity    Vte ppx already on full dose eliquis    MD ILENE Rivera. Had CTA chest  yesterday that showed acute appearing PE in segmental branch of RML. Still having some pleuritic chest pain with very deep breaths, and dyspnea with exertion.    OBJECTIVE:       Last Recorded Vitals:  Vitals:    12/29/23 1926 12/30/23 0013 12/30/23 0331 12/30/23 0826   BP: 139/69 148/68 176/89 134/72   BP Location:   Left arm    Patient Position:   Lying Sitting   Pulse: 104 98 95 87   Resp:  16 16 16   Temp: 37.1 °C (98.8 °F) 36.6 °C (97.9 °F) 35.9 °C (96.6 °F) 36.3 °C (97.3 °F)   TempSrc:   Temporal    SpO2: 99% 93% 97% 97%   Weight:       Height:           Last I/O:  I/O last 3 completed shifts:  In: 1117 (8.2 mL/kg) [I.V.:717 (5.3 mL/kg); Other:400]  Out: 8400 (61.9 mL/kg) [Urine:400 (0.1 mL/kg/hr); Other:8000]  Weight: 135.6 kg     Physical Exam:  GEN: healthy appearing, appears stated age, NAD  HEENT: NCAT  CV: RRR, no m/r/g, no LE edema  LUNGS: diminished breath sounds, poor air movement  ABD: soft, NT, obese  SKIN: no rashes  MSK; no gross deformities, normal joints  NEURO: A+Ox3, no FND  PSYCH: appropriate mood, affect    Inpatient Medications:  apixaban, 5 mg, oral, BID  atorvastatin, 20 mg, oral, Daily  heparin, 3,000 Units, intra-catheter, After Dialysis  heparin, 3,000 Units, intra-catheter, After Dialysis  insulin glargine, 42 Units, subcutaneous, BID  insulin lispro, 0-10 Units, subcutaneous, TID with meals  ipratropium-albuteroL, 3 mL, nebulization, q6h  lidocaine, 1 patch, transdermal, Daily  metoprolol succinate XL, 50 mg, oral, Daily  montelukast, 10 mg, oral, Daily  sennosides, 2 tablet, oral, BID        PRN Medications  PRN medications: albumin human, dextrose 10 % in water (D10W), dextrose, glucagon, nitroglycerin, ondansetron, traMADol    Continuous Medications:         LABS AND IMAGING:     Labs:  Results for orders placed or performed during the hospital encounter of 12/28/23 (from the past 24 hour(s))   POCT GLUCOSE   Result Value Ref Range    POCT Glucose 137 (H) 74 - 99 mg/dL   POCT  GLUCOSE   Result Value Ref Range    POCT Glucose 229 (H) 74 - 99 mg/dL   ECG 12 Lead   Result Value Ref Range    Ventricular Rate 93 BPM    Atrial Rate 93 BPM    RI Interval 176 ms    QRS Duration 74 ms    QT Interval 380 ms    QTC Calculation(Bazett) 472 ms    P Axis 68 degrees    R Axis -16 degrees    T Axis -3 degrees    QRS Count 15 beats    Q Onset 225 ms    P Onset 137 ms    P Offset 182 ms    T Offset 415 ms    QTC Fredericia 440 ms   POCT GLUCOSE   Result Value Ref Range    POCT Glucose 399 (H) 74 - 99 mg/dL   Basic Metabolic Panel   Result Value Ref Range    Glucose 427 (H) 74 - 99 mg/dL    Sodium 127 (L) 136 - 145 mmol/L    Potassium 5.7 (H) 3.5 - 5.3 mmol/L    Chloride 96 (L) 98 - 107 mmol/L    Bicarbonate 21 21 - 32 mmol/L    Anion Gap 16 10 - 20 mmol/L    Urea Nitrogen 53 (H) 6 - 23 mg/dL    Creatinine 7.39 (H) 0.50 - 1.05 mg/dL    eGFR 7 (L) >60 mL/min/1.73m*2    Calcium 7.5 (L) 8.6 - 10.3 mg/dL   Heparin Assay   Result Value Ref Range    Heparin Unfractionated 1.2 (HH) See Comment Below for Therapeutic Ranges IU/mL   POCT GLUCOSE   Result Value Ref Range    POCT Glucose 344 (H) 74 - 99 mg/dL   Lavender Top   Result Value Ref Range    Extra Tube Hold for add-ons.    SST TOP   Result Value Ref Range    Extra Tube Hold for add-ons.    Heparin Assay   Result Value Ref Range    Heparin Unfractionated 0.9 See Comment Below for Therapeutic Ranges IU/mL   POCT GLUCOSE   Result Value Ref Range    POCT Glucose 451 (H) 74 - 99 mg/dL        Imaging:  ECG 12 Lead  Normal sinus rhythm  Normal ECG  When compared with ECG of 29-DEC-2023 03:21,  Nonspecific T wave abnormality now evident in Inferior leads

## 2023-12-30 NOTE — PROGRESS NOTES
"Renal Progress Note    Assessment and Plan:   38 y.o. yo female admitted with shortness of breath.  She has end-stage renal disease due to diabetes mellitus and hypertension.  Started dialysis in June 2023.  Access is a right sided PermCath.  Had attempted at AV fistula but that had to be taken down.  Has been referred back to vascular.  She missed dialysis for the last week due to travel.           Plan/  Hemodialysis tomorrowwith about 3 L fluid removal  I called the dialysis unit to update them.  I also discussed with the patient in detail that if she is going to travel she needs to let the dialysis unit know ahead of time so they can arrange dialysis wherever she would be going.  Explained dangers of missing dialysis.  Next dialysis will be Sunday  Could be discharged at any point from a kidney standpoint  Would do 5 mg bid dosing for eliquis  Outpatient follow up from renal standpoint: Dr. Mclaughlin    Subjective:   Admit Date: 12/28/2023    Interval History: got hd yesterday.  Ct scan noted.  Blood clot but unclear if new.  Some cp.  Some sob.       Medications:   Scheduled Meds:apixaban, 5 mg, oral, BID  atorvastatin, 20 mg, oral, Daily  heparin, 3,000 Units, intra-catheter, After Dialysis  heparin, 3,000 Units, intra-catheter, After Dialysis  insulin glargine, 42 Units, subcutaneous, BID  insulin lispro, 0-10 Units, subcutaneous, TID with meals  lidocaine, 1 patch, transdermal, Daily  metoprolol succinate XL, 50 mg, oral, Daily  montelukast, 10 mg, oral, Daily  sennosides, 2 tablet, oral, BID      Continuous Infusions:     CBC:   Lab Results   Component Value Date    HGB 10.4 (L) 12/28/2023    WBC 11.0 12/28/2023     12/28/2023      Anemia:  No results found for: \"FERRITIN\", \"IRON\", \"TIBC\"   BMP:    Lab Results   Component Value Date     (L) 12/30/2023     12/28/2023    K 5.7 (H) 12/30/2023    K 4.3 12/28/2023    CL 96 (L) 12/30/2023     12/28/2023    CO2 21 12/30/2023    CO2 19 (L) " "12/28/2023    BUN 53 (H) 12/30/2023    BUN 71 (H) 12/28/2023    CREATININE 7.39 (H) 12/30/2023    CREATININE 8.94 (H) 12/28/2023      Bone disease: No results found for: \"PHOS\", \"PTH\", \"VITD25\"   Urinalysis:  No results found for: \"LAUREN\", \"PROTUR\", \"GLUCOSEU\", \"BLOODU\", \"KETONESU\", \"BILIRUBINU\", \"NITRITEU\", \"LEUKOCYTESU\", \"UTPCR\"     Objective:   Vitals: /72 (Patient Position: Sitting)   Pulse 87   Temp 36.3 °C (97.3 °F)   Resp 16   Ht 1.702 m (5' 7\")   Wt 136 kg (299 lb)   LMP  (LMP Unknown)   SpO2 97%   BMI 46.83 kg/m²    Wt Readings from Last 3 Encounters:   12/29/23 136 kg (299 lb)   10/20/23 134 kg (295 lb 9.6 oz)      24HR INTAKE/OUTPUT:    Intake/Output Summary (Last 24 hours) at 12/30/2023 1248  Last data filed at 12/30/2023 0600  Gross per 24 hour   Intake 1117 ml   Output 8400 ml   Net -7283 ml     Admission weight:  Weight: 132 kg (290 lb)      Constitutional:  Alert, awake, no apparent distress   Skin:normal, no rash  HEENT:sclera anicteric.  Head atraumatic normocephalic  Neck:supple with no thyromegally  Cardiovascular:  S1, S2 without m/r/g   Respiratory:  CTA B without w/r/r   Abdomen: +bs, soft, nt  Ext: no LE edema  Musculoskeletal:Intact  Neuro:Alert and oriented with no deficit      Electronically signed by Jose L Vega MD on 12/30/2023 at 12:48 PM            "

## 2023-12-30 NOTE — CARE PLAN
The patient's goals for the shift include      The clinical goals for the shift include Pt will have decrease in pain scores.    Over the shift, the patient did not make progress toward the following goals. Barriers to progression include chronic illness. Recommendations to address these barriers include administer pain medication and provide a quiet environment.

## 2023-12-30 NOTE — SIGNIFICANT EVENT
Noted results of CTA with acute segmental RLL pulm artery. Her previously diagnosed PE was at least month if not two ago. Given uncertainty if this is a new PE or not will start on heparin gtt for now and hold apixaban.

## 2023-12-30 NOTE — NURSING NOTE
Dr Mercado at the patient's bedside to evaluate her for complaints of numbness of face and tongue.  Patient also vomited a large amount of liquid and undigested food.  Vital signs and accucheck obtained.  Dr baltazar of results.

## 2023-12-30 NOTE — NURSING NOTE
"Dr Cardozaer aware that patient states the tramadol is not helping her pain.  She is now complaining of chest pain '10\" and would something more for her pain.  12 lead EKG and vital signs obtained.  Will continue to monitor.  "

## 2023-12-31 ENCOUNTER — APPOINTMENT (OUTPATIENT)
Dept: DIALYSIS | Facility: HOSPITAL | Age: 38
End: 2023-12-31
Payer: MEDICARE

## 2023-12-31 VITALS
WEIGHT: 293 LBS | HEIGHT: 67 IN | TEMPERATURE: 97.2 F | BODY MASS INDEX: 45.99 KG/M2 | OXYGEN SATURATION: 97 % | DIASTOLIC BLOOD PRESSURE: 60 MMHG | HEART RATE: 113 BPM | SYSTOLIC BLOOD PRESSURE: 117 MMHG | RESPIRATION RATE: 16 BRPM

## 2023-12-31 LAB
ANION GAP SERPL CALC-SCNC: 17 MMOL/L (ref 10–20)
BUN SERPL-MCNC: 68 MG/DL (ref 6–23)
CALCIUM SERPL-MCNC: 7.7 MG/DL (ref 8.6–10.3)
CHLORIDE SERPL-SCNC: 99 MMOL/L (ref 98–107)
CO2 SERPL-SCNC: 22 MMOL/L (ref 21–32)
CREAT SERPL-MCNC: 8.78 MG/DL (ref 0.5–1.05)
GFR SERPL CREATININE-BSD FRML MDRD: 5 ML/MIN/1.73M*2
GLUCOSE BLD MANUAL STRIP-MCNC: 128 MG/DL (ref 74–99)
GLUCOSE BLD MANUAL STRIP-MCNC: 157 MG/DL (ref 74–99)
GLUCOSE BLD MANUAL STRIP-MCNC: 174 MG/DL (ref 74–99)
GLUCOSE BLD MANUAL STRIP-MCNC: 223 MG/DL (ref 74–99)
GLUCOSE BLD MANUAL STRIP-MCNC: 252 MG/DL (ref 74–99)
GLUCOSE BLD MANUAL STRIP-MCNC: 328 MG/DL (ref 74–99)
GLUCOSE BLD MANUAL STRIP-MCNC: 373 MG/DL (ref 74–99)
GLUCOSE BLD MANUAL STRIP-MCNC: 426 MG/DL (ref 74–99)
GLUCOSE BLD MANUAL STRIP-MCNC: 445 MG/DL (ref 74–99)
GLUCOSE SERPL-MCNC: 232 MG/DL (ref 74–99)
HOLD SPECIMEN: NORMAL
HOLD SPECIMEN: NORMAL
PHOSPHATE SERPL-MCNC: 4.8 MG/DL (ref 2.5–4.9)
POTASSIUM SERPL-SCNC: 4.1 MMOL/L (ref 3.5–5.3)
SODIUM SERPL-SCNC: 134 MMOL/L (ref 136–145)

## 2023-12-31 PROCEDURE — 80048 BASIC METABOLIC PNL TOTAL CA: CPT | Performed by: INTERNAL MEDICINE

## 2023-12-31 PROCEDURE — 2500000001 HC RX 250 WO HCPCS SELF ADMINISTERED DRUGS (ALT 637 FOR MEDICARE OP): Performed by: NURSE PRACTITIONER

## 2023-12-31 PROCEDURE — 2500000001 HC RX 250 WO HCPCS SELF ADMINISTERED DRUGS (ALT 637 FOR MEDICARE OP): Performed by: INTERNAL MEDICINE

## 2023-12-31 PROCEDURE — 84100 ASSAY OF PHOSPHORUS: CPT | Performed by: INTERNAL MEDICINE

## 2023-12-31 PROCEDURE — 94640 AIRWAY INHALATION TREATMENT: CPT

## 2023-12-31 PROCEDURE — 36415 COLL VENOUS BLD VENIPUNCTURE: CPT | Performed by: INTERNAL MEDICINE

## 2023-12-31 PROCEDURE — 2500000004 HC RX 250 GENERAL PHARMACY W/ HCPCS (ALT 636 FOR OP/ED): Performed by: INTERNAL MEDICINE

## 2023-12-31 PROCEDURE — 2500000005 HC RX 250 GENERAL PHARMACY W/O HCPCS: Performed by: INTERNAL MEDICINE

## 2023-12-31 PROCEDURE — 99239 HOSP IP/OBS DSCHRG MGMT >30: CPT | Performed by: INTERNAL MEDICINE

## 2023-12-31 PROCEDURE — 82947 ASSAY GLUCOSE BLOOD QUANT: CPT

## 2023-12-31 PROCEDURE — 2500000002 HC RX 250 W HCPCS SELF ADMINISTERED DRUGS (ALT 637 FOR MEDICARE OP, ALT 636 FOR OP/ED): Performed by: INTERNAL MEDICINE

## 2023-12-31 PROCEDURE — 8010000001 HC DIALYSIS - HEMODIALYSIS PER DAY

## 2023-12-31 RX ORDER — HYDROCODONE BITARTRATE AND ACETAMINOPHEN 5; 325 MG/1; MG/1
1 TABLET ORAL EVERY 6 HOURS PRN
Qty: 10 TABLET | Refills: 0 | Status: SHIPPED | OUTPATIENT
Start: 2023-12-31 | End: 2024-03-16 | Stop reason: WASHOUT

## 2023-12-31 RX ADMIN — IPRATROPIUM BROMIDE AND ALBUTEROL SULFATE 3 ML: 2.5; .5 SOLUTION RESPIRATORY (INHALATION) at 20:09

## 2023-12-31 RX ADMIN — METOPROLOL SUCCINATE 50 MG: 50 TABLET, EXTENDED RELEASE ORAL at 08:57

## 2023-12-31 RX ADMIN — INSULIN LISPRO 2 UNITS: 100 INJECTION, SOLUTION INTRAVENOUS; SUBCUTANEOUS at 08:57

## 2023-12-31 RX ADMIN — MONTELUKAST SODIUM 10 MG: 10 TABLET, FILM COATED ORAL at 08:57

## 2023-12-31 RX ADMIN — MOMETASONE FUROATE 1 PUFF: 220 INHALANT RESPIRATORY (INHALATION) at 08:55

## 2023-12-31 RX ADMIN — IPRATROPIUM BROMIDE AND ALBUTEROL SULFATE 3 ML: 2.5; .5 SOLUTION RESPIRATORY (INHALATION) at 08:00

## 2023-12-31 RX ADMIN — HEPARIN SODIUM 3000 UNITS: 1000 INJECTION, SOLUTION INTRAVENOUS; SUBCUTANEOUS at 20:50

## 2023-12-31 RX ADMIN — INSULIN GLARGINE 42 UNITS: 100 INJECTION, SOLUTION SUBCUTANEOUS at 08:56

## 2023-12-31 RX ADMIN — INSULIN LISPRO 15 UNITS: 100 INJECTION, SOLUTION INTRAVENOUS; SUBCUTANEOUS at 00:00

## 2023-12-31 RX ADMIN — APIXABAN 5 MG: 5 TABLET, FILM COATED ORAL at 08:57

## 2023-12-31 RX ADMIN — HEPARIN SODIUM 3000 UNITS: 1000 INJECTION, SOLUTION INTRAVENOUS; SUBCUTANEOUS at 20:51

## 2023-12-31 RX ADMIN — INSULIN LISPRO 6 UNITS: 100 INJECTION, SOLUTION INTRAVENOUS; SUBCUTANEOUS at 12:39

## 2023-12-31 RX ADMIN — HYDROCODONE BITARTRATE AND ACETAMINOPHEN 1 TABLET: 5; 325 TABLET ORAL at 09:08

## 2023-12-31 RX ADMIN — LORATADINE 10 MG: 10 TABLET ORAL at 08:56

## 2023-12-31 RX ADMIN — IPRATROPIUM BROMIDE AND ALBUTEROL SULFATE 3 ML: 2.5; .5 SOLUTION RESPIRATORY (INHALATION) at 01:20

## 2023-12-31 RX ADMIN — APIXABAN 5 MG: 5 TABLET, FILM COATED ORAL at 21:53

## 2023-12-31 RX ADMIN — LIDOCAINE 1 PATCH: 4 PATCH TOPICAL at 08:56

## 2023-12-31 RX ADMIN — ATORVASTATIN CALCIUM 20 MG: 20 TABLET, FILM COATED ORAL at 08:57

## 2023-12-31 RX ADMIN — CYCLOBENZAPRINE HYDROCHLORIDE 10 MG: 10 TABLET, FILM COATED ORAL at 08:56

## 2023-12-31 ASSESSMENT — COGNITIVE AND FUNCTIONAL STATUS - GENERAL
MOBILITY SCORE: 24
DAILY ACTIVITIY SCORE: 24

## 2023-12-31 ASSESSMENT — PAIN SCALES - GENERAL
PAINLEVEL_OUTOF10: 0 - NO PAIN
PAINLEVEL_OUTOF10: 9

## 2023-12-31 ASSESSMENT — PAIN - FUNCTIONAL ASSESSMENT
PAIN_FUNCTIONAL_ASSESSMENT: 0-10
PAIN_FUNCTIONAL_ASSESSMENT: NO/DENIES PAIN

## 2023-12-31 NOTE — DISCHARGE SUMMARY
DISCHARGE DIAGNOSIS     Volume overload due to missed HD  Chronic PE  Asthma exacerbation  Chest tightness  ESRD on HD    HOSPITAL COURSE AND DETAILS     Alexus Haley is a 38 y.o. female with a significant past medical history of ESRD on regular hemodialysis, insulin-dependent diabetes, hypertension, asthma, history of PE on Eliquis, who is presenting to emergency department shortness of breath.  Shortness of breath began yesterday.  She says that she was out of town and had arranged to have a hemodialysis at a facility there but apparently there was a mixup and she was not able to get it so her last session was last Friday.  Denies any cough or fever.  She had not been having any chest discomfort until actually she got here.  She says she developed some chest tightness when she was walking in the emergency room.  She has never had similar symptoms previously and denies any prior cardiovascular disease     Chest tightness  Possible asthma exacerbation  - serial trops normal, ecgs with nsr, not ACS  - doubt related to chronic segmental PE as described below, but possible lingering effeccts  - seems more msk to me, does also sound tight with poor air movement, maybe 2/2 asthma exacerbation as well  Plan:  - improved, resume home asthma medications, no prednisone due to uncontrolled DM1     Hx of PE on Eliquis  - was diagnosed at recent admission at [facility] on 10/25/23 in segmental branch of RLL, confirmed with OSH records faxed over  - she was on Eliquis 5mg BID, which was dose reduced to 2.5mg BID after a repeat admission for menorrhagia that did not require blood transfusion. She has since had IUD by gynecology placed, and only has spotting now.   - she had repeat CTA chest done here, that is read as acute PE in segmental branch of RLL. I do not believe this represents new acute PE, and is rather existing thrombus from prior diagnosis given same location. Regardless, even if new acute PE, it would be in setting  of dose reduced Eliquis, and does not represent DOAC failure.   - no R heart strain on CT, normal biomarkers, HDS, no need for TTE either  Plan:  - will resume Eliquis, but at 5mg BID dose since her uterine bleeding is now controlled, her Hgb is stable, and she has indication for full dose Eliquis with recent VTE     Anemia of renal disease  - Hgb at baseline, no active bleeding, her uterine bleeding has resolved since IUD placement  - labs c/w AoCD, due to her ESRD     DM1  - resume home insulin, had issues with uncontrolled BG here due to prednisone, now stopped     ESRD on HD  - via R sided TDC, follows with Dr. Mclaughlin  - presented with 2 missed sessions due to travel, had volume overload, now improved  - nephrology following for HD needs, last session was done here in hospital on 12/31/23     Hypertensive urgency  - improved with HD, resume home meds     Morbid obesity    Stable for dc to home today. Total time spent on discharge services 31 minutes.       DISCHARGE PHYSICAL EXAM     Last Recorded Vitals:  Vitals:    12/31/23 0120 12/31/23 0421 12/31/23 0800 12/31/23 0802   BP:  134/77  135/74   BP Location:       Patient Position:    Sitting   Pulse:  96  98   Resp:  16     Temp:  36 °C (96.8 °F)  36.6 °C (97.9 °F)   TempSrc:    Temporal   SpO2: 93% 96% (!) 9% 99%   Weight:       Height:           Physical Exam:  GEN: healthy appearing, appears stated age, NAD  HEENT: NCAT  CV: RRR, no m/r/g, no LE edema  LUNGS: diminished breath sounds, better air movement  ABD: soft, NT, obese  SKIN: no rashes  MSK; no gross deformities, normal joints  NEURO: A+Ox3, no FND  PSYCH: appropriate mood, affect      PERTINENT LABS AND IMAGING     Results for orders placed or performed during the hospital encounter of 12/28/23 (from the past 96 hour(s))   CBC and Auto Differential   Result Value Ref Range    WBC 11.0 4.4 - 11.3 x10*3/uL    nRBC 0.0 0.0 - 0.0 /100 WBCs    RBC 3.76 (L) 4.00 - 5.20 x10*6/uL    Hemoglobin 10.4 (L) 12.0 -  16.0 g/dL    Hematocrit 33.4 (L) 36.0 - 46.0 %    MCV 89 80 - 100 fL    MCH 27.7 26.0 - 34.0 pg    MCHC 31.1 (L) 32.0 - 36.0 g/dL    RDW 14.4 11.5 - 14.5 %    Platelets 306 150 - 450 x10*3/uL    Neutrophils % 77.4 40.0 - 80.0 %    Immature Granulocytes %, Automated 0.5 0.0 - 0.9 %    Lymphocytes % 14.8 13.0 - 44.0 %    Monocytes % 5.4 2.0 - 10.0 %    Eosinophils % 1.5 0.0 - 6.0 %    Basophils % 0.4 0.0 - 2.0 %    Neutrophils Absolute 8.53 (H) 1.20 - 7.70 x10*3/uL    Immature Granulocytes Absolute, Automated 0.05 0.00 - 0.70 x10*3/uL    Lymphocytes Absolute 1.63 1.20 - 4.80 x10*3/uL    Monocytes Absolute 0.59 0.10 - 1.00 x10*3/uL    Eosinophils Absolute 0.17 0.00 - 0.70 x10*3/uL    Basophils Absolute 0.04 0.00 - 0.10 x10*3/uL   Comprehensive metabolic panel   Result Value Ref Range    Glucose 106 (H) 74 - 99 mg/dL    Sodium 137 136 - 145 mmol/L    Potassium 4.3 3.5 - 5.3 mmol/L    Chloride 103 98 - 107 mmol/L    Bicarbonate 19 (L) 21 - 32 mmol/L    Anion Gap 19 10 - 20 mmol/L    Urea Nitrogen 71 (H) 6 - 23 mg/dL    Creatinine 8.94 (H) 0.50 - 1.05 mg/dL    eGFR 5 (L) >60 mL/min/1.73m*2    Calcium 7.7 (L) 8.6 - 10.3 mg/dL    Albumin 3.7 3.4 - 5.0 g/dL    Alkaline Phosphatase 77 33 - 110 U/L    Total Protein 7.4 6.4 - 8.2 g/dL    AST 9 9 - 39 U/L    Bilirubin, Total 0.4 0.0 - 1.2 mg/dL    ALT 6 (L) 7 - 45 U/L   B-Type Natriuretic Peptide   Result Value Ref Range    BNP 84 0 - 99 pg/mL   Troponin I, High Sensitivity   Result Value Ref Range    Troponin I, High Sensitivity 13 0 - 13 ng/L   Gray Top   Result Value Ref Range    Extra Tube Hold for add-ons.    ECG 12 lead   Result Value Ref Range    Ventricular Rate 96 BPM    Atrial Rate 96 BPM    DC Interval 156 ms    QRS Duration 76 ms    QT Interval 384 ms    QTC Calculation(Bazett) 485 ms    P Axis 61 degrees    R Axis -20 degrees    T Axis 25 degrees    QRS Count 15 beats    Q Onset 224 ms    P Onset 146 ms    P Offset 188 ms    T Offset 416 ms    QTC Fredericia 449 ms    ECG 12 Lead   Result Value Ref Range    Ventricular Rate 96 BPM    Atrial Rate 96 BPM    MO Interval 172 ms    QRS Duration 74 ms    QT Interval 374 ms    QTC Calculation(Bazett) 472 ms    P Axis 81 degrees    R Axis -10 degrees    T Axis 23 degrees    QRS Count 15 beats    Q Onset 223 ms    P Onset 137 ms    P Offset 188 ms    T Offset 410 ms    QTC Fredericia 437 ms   ECG 12 lead   Result Value Ref Range    Ventricular Rate 99 BPM    Atrial Rate 99 BPM    MO Interval 166 ms    QRS Duration 74 ms    QT Interval 374 ms    QTC Calculation(Bazett) 479 ms    P Axis 78 degrees    R Axis -2 degrees    T Axis 45 degrees    QRS Count 16 beats    Q Onset 225 ms    P Onset 142 ms    P Offset 185 ms    T Offset 412 ms    QTC Fredericia 441 ms   Lavender Top   Result Value Ref Range    Extra Tube Hold for add-ons.    Troponin I, High Sensitivity   Result Value Ref Range    Troponin I, High Sensitivity 13 0 - 13 ng/L   POCT GLUCOSE   Result Value Ref Range    POCT Glucose 336 (H) 74 - 99 mg/dL   POCT GLUCOSE   Result Value Ref Range    POCT Glucose 210 (H) 74 - 99 mg/dL   POCT GLUCOSE   Result Value Ref Range    POCT Glucose 137 (H) 74 - 99 mg/dL   POCT GLUCOSE   Result Value Ref Range    POCT Glucose 229 (H) 74 - 99 mg/dL   ECG 12 Lead   Result Value Ref Range    Ventricular Rate 93 BPM    Atrial Rate 93 BPM    MO Interval 176 ms    QRS Duration 74 ms    QT Interval 380 ms    QTC Calculation(Bazett) 472 ms    P Axis 68 degrees    R Axis -16 degrees    T Axis -3 degrees    QRS Count 15 beats    Q Onset 225 ms    P Onset 137 ms    P Offset 182 ms    T Offset 415 ms    QTC Fredericia 440 ms   POCT GLUCOSE   Result Value Ref Range    POCT Glucose 399 (H) 74 - 99 mg/dL   Basic Metabolic Panel   Result Value Ref Range    Glucose 427 (H) 74 - 99 mg/dL    Sodium 127 (L) 136 - 145 mmol/L    Potassium 5.7 (H) 3.5 - 5.3 mmol/L    Chloride 96 (L) 98 - 107 mmol/L    Bicarbonate 21 21 - 32 mmol/L    Anion Gap 16 10 - 20 mmol/L    Urea  Nitrogen 53 (H) 6 - 23 mg/dL    Creatinine 7.39 (H) 0.50 - 1.05 mg/dL    eGFR 7 (L) >60 mL/min/1.73m*2    Calcium 7.5 (L) 8.6 - 10.3 mg/dL   Heparin Assay   Result Value Ref Range    Heparin Unfractionated 1.2 (HH) See Comment Below for Therapeutic Ranges IU/mL   POCT GLUCOSE   Result Value Ref Range    POCT Glucose 344 (H) 74 - 99 mg/dL   Lavender Top   Result Value Ref Range    Extra Tube Hold for add-ons.    SST TOP   Result Value Ref Range    Extra Tube Hold for add-ons.    CBC   Result Value Ref Range    WBC 8.8 4.4 - 11.3 x10*3/uL    nRBC 0.0 0.0 - 0.0 /100 WBCs    RBC 3.78 (L) 4.00 - 5.20 x10*6/uL    Hemoglobin 10.4 (L) 12.0 - 16.0 g/dL    Hematocrit 33.0 (L) 36.0 - 46.0 %    MCV 87 80 - 100 fL    MCH 27.5 26.0 - 34.0 pg    MCHC 31.5 (L) 32.0 - 36.0 g/dL    RDW 14.3 11.5 - 14.5 %    Platelets 196 150 - 450 x10*3/uL   Iron and TIBC   Result Value Ref Range    Iron 98 35 - 150 ug/dL    UIBC 177 110 - 370 ug/dL    TIBC 275 240 - 445 ug/dL    % Saturation 36 25 - 45 %   Ferritin   Result Value Ref Range    Ferritin 1,244 (H) 8 - 150 ng/mL   Reticulocytes   Result Value Ref Range    Retic % 2.3 (H) 0.5 - 2.0 %    Retic Absolute 0.087 (H) 0.018 - 0.083 x10*6/uL    Reticulocyte Hemoglobin 29 28 - 38 pg    Immature Retic fraction 18.5 (H) <=16.0 %   Heparin Assay   Result Value Ref Range    Heparin Unfractionated 0.9 See Comment Below for Therapeutic Ranges IU/mL   POCT GLUCOSE   Result Value Ref Range    POCT Glucose 451 (H) 74 - 99 mg/dL   POCT GLUCOSE   Result Value Ref Range    POCT Glucose 445 (H) 74 - 99 mg/dL   Basic metabolic panel   Result Value Ref Range    Glucose 455 (HH) 74 - 99 mg/dL    Sodium 125 (L) 136 - 145 mmol/L    Potassium 5.3 3.5 - 5.3 mmol/L    Chloride 94 (L) 98 - 107 mmol/L    Bicarbonate 19 (L) 21 - 32 mmol/L    Anion Gap 17 10 - 20 mmol/L    Urea Nitrogen 65 (H) 6 - 23 mg/dL    Creatinine 8.39 (H) 0.50 - 1.05 mg/dL    eGFR 6 (L) >60 mL/min/1.73m*2    Calcium 7.5 (L) 8.6 - 10.3 mg/dL   Beta  Hydroxybutyrate   Result Value Ref Range    Beta-Hydroxybutyrate 0.08 0.02 - 0.27 mmol/L   POCT GLUCOSE   Result Value Ref Range    POCT Glucose 466 (H) 74 - 99 mg/dL   POCT GLUCOSE   Result Value Ref Range    POCT Glucose 486 (H) 74 - 99 mg/dL   POCT GLUCOSE   Result Value Ref Range    POCT Glucose 445 (H) 74 - 99 mg/dL   POCT GLUCOSE   Result Value Ref Range    POCT Glucose 426 (H) 74 - 99 mg/dL   POCT GLUCOSE   Result Value Ref Range    POCT Glucose 373 (H) 74 - 99 mg/dL   POCT GLUCOSE   Result Value Ref Range    POCT Glucose 328 (H) 74 - 99 mg/dL   Basic Metabolic Panel   Result Value Ref Range    Glucose 232 (H) 74 - 99 mg/dL    Sodium 134 (L) 136 - 145 mmol/L    Potassium 4.1 3.5 - 5.3 mmol/L    Chloride 99 98 - 107 mmol/L    Bicarbonate 22 21 - 32 mmol/L    Anion Gap 17 10 - 20 mmol/L    Urea Nitrogen 68 (H) 6 - 23 mg/dL    Creatinine 8.78 (H) 0.50 - 1.05 mg/dL    eGFR 5 (L) >60 mL/min/1.73m*2    Calcium 7.7 (L) 8.6 - 10.3 mg/dL   Phosphorus   Result Value Ref Range    Phosphorus 4.8 2.5 - 4.9 mg/dL   SST TOP   Result Value Ref Range    Extra Tube Hold for add-ons.    POCT GLUCOSE   Result Value Ref Range    POCT Glucose 223 (H) 74 - 99 mg/dL   POCT GLUCOSE   Result Value Ref Range    POCT Glucose 157 (H) 74 - 99 mg/dL   Lavender Top   Result Value Ref Range    Extra Tube Hold for add-ons.         CT angio chest for pulmonary embolism   Final Result   Acute pulmonary embolus within a segmental branch of the right lower   lobe pulmonary artery. No CT evidence for heart strain.                       MACRO:   Evan Finkelstein discussed the significance and urgency of this   critical finding by telephone with  FELA BAKER on 12/29/2023 at   9:33 pm.  (**-RCF-**) Findings:  See findings.        Signed by: Evan Finkelstein 12/29/2023 9:33 PM   Dictation workstation:   [id number]      XR chest 1 view   Final Result   1. Borderline cardiomegaly and findings most suggestive of mild edema.             Signed by:  Hussain Bhavin 12/28/2023 11:01 PM   Dictation workstation:   [id number]          No echocardiogram results found for the past 14 days    DISCHARGE MEDICATIONS        Your medication list        START taking these medications        Instructions Last Dose Given Next Dose Due   HYDROcodone-acetaminophen 5-325 mg tablet  Commonly known as: Norco      Take 1 tablet by mouth every 6 hours if needed for severe pain (7 - 10).              CHANGE how you take these medications        Instructions Last Dose Given Next Dose Due   apixaban 5 mg tablet  Commonly known as: Eliquis  What changed:   medication strength  how much to take      Take 1 tablet (5 mg) by mouth 2 times a day.              CONTINUE taking these medications        Instructions Last Dose Given Next Dose Due   albuterol 0.63 mg/3 mL nebulizer solution           Ventolin HFA 90 mcg/actuation inhaler  Generic drug: albuterol           Alcohol Prep Pads pads, medicated  Generic drug: alcohol swabs           atorvastatin 20 mg tablet  Commonly known as: Lipitor           atorvastatin 10 mg tablet  Commonly known as: Lipitor           calcitriol 0.5 mcg capsule  Commonly known as: Rocaltrol           cetirizine 10 mg tablet  Commonly known as: ZyrTEC           cyclobenzaprine 10 mg tablet  Commonly known as: Flexeril           ergocalciferol 1.25 MG (66567 UT) capsule  Commonly known as: Vitamin D-2           ferrous sulfate (325 mg ferrous sulfate) tablet           Flovent HFA 44 mcg/actuation inhaler  Generic drug: fluticasone           Lantus Solostar U-100 Insulin 100 unit/mL (3 mL) pen  Generic drug: insulin glargine           metoprolol succinate XL 50 mg 24 hr tablet  Commonly known as: Toprol-XL           montelukast 10 mg tablet  Commonly known as: Singulair           NovoLOG Flexpen U-100 Insulin 100 unit/mL (3 mL) pen  Generic drug: insulin aspart           sevelamer carbonate 800 mg tablet  Commonly known as: Renvela           sodium bicarbonate 650  "mg tablet           UltiCare Pen Needle 31 gauge x 1/4\" needle  Generic drug: pen needle, diabetic                     Where to Get Your Medications        These medications were sent to [id number] - [place], [place] - [address]  [address], [place] [address]      Phone: [phone]   apixaban 5 mg tablet  HYDROcodone-acetaminophen 5-325 mg tablet         OUTPATIENT FOLLOW-UP     No future appointments.    "

## 2023-12-31 NOTE — PROGRESS NOTES
Renal Progress Note    Assessment and Plan:   38 y.o. yo female admitted with shortness of breath.  She has end-stage renal disease due to diabetes mellitus and hypertension.  Started dialysis in June 2023.  Access is a right sided PermCath.  Had attempted at AV fistula but that had to be taken down.  Has been referred back to vascular.  She missed dialysis for the last week due to travel.           Plan/  Hemodialysis today with about 3 L fluid removal  Could be discharged at any point from a kidney standpoint  Would do 5 mg bid dosing for eliquis  Outpatient follow up from renal standpoint: Dr. Mclaughlin    Subjective:   Admit Date: 12/28/2023    Interval History: for hd today and then discharge.  No cp.  No sob.       Medications:   Scheduled Meds:apixaban, 5 mg, oral, BID  atorvastatin, 20 mg, oral, Daily  cyclobenzaprine, 10 mg, oral, Daily  heparin, 3,000 Units, intra-catheter, After Dialysis  heparin, 3,000 Units, intra-catheter, After Dialysis  insulin glargine, 42 Units, subcutaneous, BID  insulin lispro, 0-10 Units, subcutaneous, TID with meals  ipratropium-albuteroL, 3 mL, nebulization, q6h  lidocaine, 1 patch, transdermal, Daily  loratadine, 10 mg, oral, Daily  metoprolol succinate XL, 50 mg, oral, Daily  mometasone, 1 puff, inhalation, Daily  montelukast, 10 mg, oral, Daily  sennosides, 2 tablet, oral, BID      Continuous Infusions:     CBC:   Lab Results   Component Value Date    HGB 10.4 (L) 12/30/2023    HGB 10.4 (L) 12/28/2023    WBC 8.8 12/30/2023    WBC 11.0 12/28/2023     12/30/2023     12/28/2023      Anemia:    Lab Results   Component Value Date    FERRITIN 1,244 (H) 12/30/2023    IRON 98 12/30/2023    TIBC 275 12/30/2023      BMP:    Lab Results   Component Value Date     (L) 12/31/2023     (L) 12/30/2023    K 4.1 12/31/2023    K 5.3 12/30/2023    CL 99 12/31/2023    CL 94 (L) 12/30/2023    CO2 22 12/31/2023    CO2 19 (L) 12/30/2023    BUN 68 (H) 12/31/2023    BUN 65 (H)  "12/30/2023    CREATININE 8.78 (H) 12/31/2023    CREATININE 8.39 (H) 12/30/2023      Bone disease:   Lab Results   Component Value Date    PHOS 4.8 12/31/2023      Urinalysis:  No results found for: \"LAURNE\", \"PROTUR\", \"GLUCOSEU\", \"BLOODU\", \"KETONESU\", \"BILIRUBINU\", \"NITRITEU\", \"LEUKOCYTESU\", \"UTPCR\"     Objective:   Vitals: /63 (BP Location: Left arm, Patient Position: Lying)   Pulse 97   Temp 36.5 °C (97.7 °F) (Temporal)   Resp 16   Ht 1.702 m (5' 7\")   Wt 136 kg (299 lb)   LMP  (LMP Unknown)   SpO2 94%   BMI 46.83 kg/m²    Wt Readings from Last 3 Encounters:   12/29/23 136 kg (299 lb)   10/20/23 134 kg (295 lb 9.6 oz)      24HR INTAKE/OUTPUT:    Intake/Output Summary (Last 24 hours) at 12/31/2023 1244  Last data filed at 12/30/2023 2200  Gross per 24 hour   Intake --   Output 400 ml   Net -400 ml       Admission weight:  Weight: 132 kg (290 lb)      Constitutional:  Alert, awake, no apparent distress   Skin:normal, no rash  HEENT:sclera anicteric.  Head atraumatic normocephalic  Neck:supple with no thyromegally  Cardiovascular:  S1, S2 without m/r/g   Respiratory:  CTA B without w/r/r   Abdomen: +bs, soft, nt  Ext: no LE edema  Musculoskeletal:Intact  Neuro:Alert and oriented with no deficit      Electronically signed by Jose L Vega MD on 12/31/2023 at 12:44 PM            "

## 2024-01-01 LAB
ATRIAL RATE: 93 BPM
P AXIS: 68 DEGREES
P OFFSET: 182 MS
P ONSET: 137 MS
PR INTERVAL: 176 MS
Q ONSET: 225 MS
QRS COUNT: 15 BEATS
QRS DURATION: 74 MS
QT INTERVAL: 380 MS
QTC CALCULATION(BAZETT): 472 MS
QTC FREDERICIA: 440 MS
R AXIS: -16 DEGREES
T AXIS: -3 DEGREES
T OFFSET: 415 MS
VENTRICULAR RATE: 93 BPM

## 2024-02-07 ENCOUNTER — HOSPITAL ENCOUNTER (EMERGENCY)
Facility: HOSPITAL | Age: 39
Discharge: HOME | End: 2024-02-07
Payer: COMMERCIAL

## 2024-02-07 VITALS
WEIGHT: 289 LBS | OXYGEN SATURATION: 98 % | HEIGHT: 67 IN | TEMPERATURE: 97 F | SYSTOLIC BLOOD PRESSURE: 147 MMHG | BODY MASS INDEX: 45.36 KG/M2 | DIASTOLIC BLOOD PRESSURE: 75 MMHG | RESPIRATION RATE: 18 BRPM | HEART RATE: 98 BPM

## 2024-02-07 DIAGNOSIS — L02.11 CUTANEOUS ABSCESS OF NECK: Primary | ICD-10-CM

## 2024-02-07 PROCEDURE — 99283 EMERGENCY DEPT VISIT LOW MDM: CPT

## 2024-02-07 RX ORDER — DOXYCYCLINE 100 MG/1
100 CAPSULE ORAL EVERY 12 HOURS
Qty: 14 CAPSULE | Refills: 0 | Status: SHIPPED | OUTPATIENT
Start: 2024-02-07 | End: 2024-02-14

## 2024-02-07 ASSESSMENT — LIFESTYLE VARIABLES
HAVE YOU EVER FELT YOU SHOULD CUT DOWN ON YOUR DRINKING: NO
EVER FELT BAD OR GUILTY ABOUT YOUR DRINKING: NO
HAVE PEOPLE ANNOYED YOU BY CRITICIZING YOUR DRINKING: NO
EVER HAD A DRINK FIRST THING IN THE MORNING TO STEADY YOUR NERVES TO GET RID OF A HANGOVER: NO

## 2024-02-07 ASSESSMENT — PAIN - FUNCTIONAL ASSESSMENT: PAIN_FUNCTIONAL_ASSESSMENT: 0-10

## 2024-02-07 ASSESSMENT — PAIN SCALES - GENERAL: PAINLEVEL_OUTOF10: 9

## 2024-02-07 ASSESSMENT — COLUMBIA-SUICIDE SEVERITY RATING SCALE - C-SSRS
1. IN THE PAST MONTH, HAVE YOU WISHED YOU WERE DEAD OR WISHED YOU COULD GO TO SLEEP AND NOT WAKE UP?: NO
2. HAVE YOU ACTUALLY HAD ANY THOUGHTS OF KILLING YOURSELF?: NO
6. HAVE YOU EVER DONE ANYTHING, STARTED TO DO ANYTHING, OR PREPARED TO DO ANYTHING TO END YOUR LIFE?: NO

## 2024-02-07 ASSESSMENT — PAIN DESCRIPTION - PAIN TYPE: TYPE: ACUTE PAIN

## 2024-02-08 NOTE — ED PROVIDER NOTES
"HPI   Chief Complaint   Patient presents with    Neck Pain     Right sided, pt unsure she states she felt \"a lump over a month ago, and now the lump is bigger\"       38-year-old female history of HTN, HLD, DM, asthma, ESRD on dialysis Monday Wednesday Friday presenting to the ED today with a painful lump she noticed near the base of the right side of her neck near her clavicle.  There was no fall or injury and she just noticed this a few days ago and now she states it looks like it is forming a head like a pimple.  She has not seen any redness or bruising or swelling surrounding the area but she states that it is tender.  She denies fever headache chills body aches.  She is not having difficulty swallowing or breathing and she denies chest pain or shortness of breath.  She did go to dialysis today and completed her full treatment.  No further complaints at this time.  She does not smoke or use any IV drugs.      History provided by:  Patient                      Kuna Coma Scale Score: 15                     Patient History   Past Medical History:   Diagnosis Date    Asthma     Diabetes mellitus (CMS/[facility] [place])     Hyperlipemia     Hypertension      History reviewed. No pertinent surgical history.  No family history on file.  Social History     Tobacco Use    Smoking status: Former     Types: Cigarettes    Smokeless tobacco: Never   Substance Use Topics    Alcohol use: Never    Drug use: Never       Physical Exam   ED Triage Vitals [02/07/24 1927]   Temperature Heart Rate Respirations BP   36.1 °C (97 °F) 94 18 (!) 196/86      Pulse Ox Temp Source Heart Rate Source Patient Position   98 % Temporal -- Sitting      BP Location FiO2 (%)     Right arm --       Physical Exam  Constitutional:       General: She is not in acute distress.  HENT:      Mouth/Throat:      Mouth: Mucous membranes are moist.      Pharynx: Oropharynx is clear.   Eyes:      Conjunctiva/sclera: Conjunctivae normal.   Neck:      Comments: Base of right " lateral neck with tenderness over cutaneous area of induration approximately 2 cm in diameter. No erythema, edema, crepitus or ecchymosis. No discharge, bleeding or fluctuance.  Cardiovascular:      Rate and Rhythm: Normal rate and regular rhythm.      Pulses: Normal pulses.      Heart sounds: Normal heart sounds.      Comments: Radial pulse 2+, cap refill less than 2 seconds.  Pulmonary:      Effort: Pulmonary effort is normal.      Breath sounds: Normal breath sounds.   Musculoskeletal:      Cervical back: Normal range of motion. No rigidity.      Comments: Normal gait and strength tone, no cervical spine tenderness. Chest wall without bony tenderness or bony deformity.    Lymphadenopathy:      Cervical: No cervical adenopathy.   Skin:     General: Skin is warm.   Neurological:      Mental Status: She is alert and oriented to person, place, and time.         ED Course & MDM   Diagnoses as of 02/07/24 1952   Cutaneous abscess of neck       Medical Decision Making  38-year-old female with a history of HTN, HLD, DM, asthma, ESRD on dialysis Monday Wednesday Friday presenting to the ED today with a painful lump under her skin in the right side of her neck that she noticed a few days ago.  She states now it starts to come to ahead like it looks like a pimple.  She denies fevers or chills or bodyaches.  She is not having any headache or difficulty swallowing or breathing.  There was no trauma or injury and she does not use any IV drugs.  Patient did complete dialysis today.  Patient arrives afebrile, hypertensive and she is resting comfortably without signs of acute distress and is nontoxic-appearing.  On my exam heart RRR, lungs are clear.  She has full range of motion to neck without any edema or crepitus or lymphadenopathy.  At the right lateral base of the neck there is a cutaneous area of induration that does seem to have a central punctate comedone starting to form.  There is no discharge or bleeding and no  erythema or edema or crepitus.  There is no fluctuance to suggest a drainable abscess and this is superficial in the cutaneous region of the skin.  Patient will be treated for skin abscess with antibiotics and warm compresses and close follow-up with the wound care center.  I also discussed warning signs to return to the ER and she expressed understanding and agreed with the plan of care today.        Procedure  Procedures     Isis Herrera PA-C  02/07/24 2000

## 2024-03-03 ENCOUNTER — TELEPHONE (OUTPATIENT)
Dept: SURGERY | Facility: CLINIC | Age: 39
End: 2024-03-03
Payer: COMMERCIAL

## 2024-03-03 NOTE — TELEPHONE ENCOUNTER
Scheduled patient for Bariatric NPV with Dr Medina and ANGELITO at the [facility] on 5/16

## 2024-03-07 ENCOUNTER — OFFICE VISIT (OUTPATIENT)
Dept: OTOLARYNGOLOGY | Facility: CLINIC | Age: 39
End: 2024-03-07
Payer: COMMERCIAL

## 2024-03-07 VITALS
HEIGHT: 67 IN | TEMPERATURE: 97 F | DIASTOLIC BLOOD PRESSURE: 76 MMHG | SYSTOLIC BLOOD PRESSURE: 153 MMHG | WEIGHT: 289 LBS | BODY MASS INDEX: 45.36 KG/M2

## 2024-03-07 DIAGNOSIS — T85.898A OBSTRUCTION OF PRESSURE EQUALIZATION TUBE, INITIAL ENCOUNTER: ICD-10-CM

## 2024-03-07 DIAGNOSIS — H92.02 OTALGIA, LEFT: Primary | ICD-10-CM

## 2024-03-07 DIAGNOSIS — Z98.890 HISTORY OF TYMPANOMASTOIDECTOMY: ICD-10-CM

## 2024-03-07 PROCEDURE — 1036F TOBACCO NON-USER: CPT

## 2024-03-07 PROCEDURE — 99203 OFFICE O/P NEW LOW 30 MIN: CPT

## 2024-03-07 ASSESSMENT — PATIENT HEALTH QUESTIONNAIRE - PHQ9
2. FEELING DOWN, DEPRESSED OR HOPELESS: NOT AT ALL
1. LITTLE INTEREST OR PLEASURE IN DOING THINGS: NOT AT ALL
SUM OF ALL RESPONSES TO PHQ9 QUESTIONS 1 AND 2: 0

## 2024-03-07 NOTE — PROGRESS NOTES
Patient ID: Alexus Haley is a 38 y.o. female who presents for initial evaluation of left otalgia.    PROVIDER IMPRESSIONS:  DIAGNOSES/PROBLEMS:  -Otalgia, left  -Obstruction of PE tube, right ear  -History of tympanomastoidectomy, left ear    ASSESSMENT:   Alexus Haley is a pleasant 38 y.o. female with a remote history of left tympanomastoidectomy s/p revision in 2016, multiple sets of bilateral PE tubes, and history of chronic tubotympanic suppurative otitis media presents for initial evaluation of left otalgia, left non-pulsatile tinnitus, and increased left hearing difficulty. Based on the clinical information provided, symptoms and clinical exam findings are consistent with obstruction of right PE tube. Otologic exam revealed right PE tube in appropriate place but with complete cerumen occlusion of PE tube which I did not attempt to remove in office today due to risk of dislodgement. Exam of left ear revealed clustered keratinous debris at the anteroinferior aspect of left TM with appears without visible perforation. Tuning fork exam revealed Rinne BC>AC bilaterally which correlates with bilateral conductive hearing loss, though extent is unable to be determined without available audiogram for review. Reassurance provided to patient that otologic exam today revealed no evidence of acute infection/inflammation in the EAC bilaterally and that TM appears with no evidence of infection, effusion, or retraction. I explained to patient that given her complex otologic history that further management with my otology physician partners is recommended. I explained that without any available audiogram or imaging results of the middle ear, it is difficult to sufficiently determine the etiology of presenting symptoms. We discussed that obtaining audiogram to determine current hearing function as well as receive CT IAC imaging of the temporal bones is indicated for optimal management recommendations at follow-up with an otology  physician.    PLAN:  I recommended CT IAC wo contrast to further evaluate symptom etiology and also assess for cholesteatoma vs. Other middle ear disease.  I recommended referral to my otology physician colleague Dr. Cristofer Palacios to establish care for more complex otologic management needs. Referral e-submitted and patient informed that she will receive a phone call to schedule visit with Dr. Palacios.   I recommended audiogram evaluation prior to visit with Dr. Palacios to determine current status of hearing function, as it appears that last audiogram from [facility] may be from 2022 but unable to access results.   Follow-up: Patient may schedule for follow-up with Dr. Cristofer Palacios in the next 4-6 weeks with prior CT IAC and audiogram. They may follow-up sooner, if needed. Patient is agreeable to this plan, all questions were answered to patient's satisfaction.     Subjective   HPI: Alexus Haley is a 38 y.o. female  who presents for the evaluation of left ear pain. The patient states that symptoms have been intermittently present for many years but most recent onset began a few weeks ago. When asked about the presence of hearing loss, ear fullness/pressure, tinnitus, ear itching, ear drainage, autophony, dizziness or vertigo, she admits to left hearing loss, left tinnitus, and bilateral ear itching. Reports that hearing in the left ear sounds muffled/plugged. Describes tinnitus as non-pulsatile ringing sound. Mentions that she will often notice ear drainage and bleeding from both ears but denies current ear drainage after recently using an old bottle of ofloxacin otic drops from prior ear infections. When asked about pertinent otologic history, the patient reports a history of recurrent ear infections throughout lifetime. Patient reports history of multiple ear surgeries total between both ears during the time she lived in [place] does not know type. Patient reports history of multiple PE tube  insertions and states that right PE tube is in place but that ENT physician removed last left PE tube a few months after placement. Patient denies history of prolonged/traumatic loud noise exposure. Mentions that she was previously established with multiple ENT physicians in [place] and [place] who recommended middle ear exploration surgery at that time which she did not pursue. The patient does not endorse a family history of hearing loss.   Other pertinent PMH: asthma, DM, ESRD on HD 3x weekly, HTN, HLD, LUIS FELIPE, GERD, and history of pulmonary embolism currently on Eliquis.     I personally reviewed the provider note from Dr. Rudi Hannah at [facility] from 3/7/2024 with the following summary of surgical/procedural otologic history provided:   1995: Hx of left tympanomastoidectomy s/p multiple PE tube sets and chronic recurrent otorrhea.  12/9/2016: Hx of left tympanomastoidectomy revision w/ OCR and PE tube placement.  9/19/2018: Hx of right PE tube removal and left PE T-tube placement.  11/11/2019: s/p PE T-tube placement.    PATIENT HISTORY:  Past Medical History:   Diagnosis Date    Asthma     Diabetes mellitus (CMS/HCC)     Hyperlipemia     Hypertension       History reviewed. No pertinent surgical history.   Allergies   Allergen Reactions    Gabapentin Angioedema    Mushroom Hives    Percocet [Oxycodone-Acetaminophen] Angioedema    Strawberry Hives        Current Outpatient Medications:     albuterol 0.63 mg/3 mL nebulizer solution, USE ONE VIAL IN NEBULIZER EVERY 6 HOURS AS NEEDED, Disp: , Rfl:     Alcohol Prep Pads pads, medicated, USE 3 PADS 3 TIMES A DAY, Disp: , Rfl:     apixaban (Eliquis) 5 mg tablet, Take 1 tablet (5 mg) by mouth 2 times a day., Disp: 60 tablet, Rfl: 2    atorvastatin (Lipitor) 10 mg tablet, , Disp: , Rfl:     atorvastatin (Lipitor) 20 mg tablet, Take 1 tablet (20 mg) by mouth once daily. as directed, Disp: , Rfl:     calcitriol (Rocaltrol) 0.5 mcg capsule, Take 1 capsule  "(0.5 mcg) by mouth once daily., Disp: , Rfl:     cetirizine (ZyrTEC) 10 mg tablet, Take 1 tablet (10 mg) by mouth once daily., Disp: , Rfl:     cyclobenzaprine (Flexeril) 10 mg tablet, Take 1 tablet (10 mg) by mouth once daily., Disp: , Rfl:     ergocalciferol (Vitamin D-2) 1.25 MG (02301 UT) capsule, Take 1 capsule (1,250 mcg) by mouth 1 (one) time per week., Disp: , Rfl:     ferrous sulfate 325 (65 Fe) MG tablet, Take 1 tablet (325 mg) by mouth every other day., Disp: , Rfl:     Flovent HFA 44 mcg/actuation inhaler, Inhale 2 puffs 2 times a day., Disp: , Rfl:     HYDROcodone-acetaminophen (Norco) 5-325 mg tablet, Take 1 tablet by mouth every 6 hours if needed for severe pain (7 - 10)., Disp: 10 tablet, Rfl: 0    Lantus Solostar U-100 Insulin 100 unit/mL (3 mL) pen, INJECT 42 UNITS SUBCUTANEOUSLY TWICE A DAY, Disp: , Rfl:     metoprolol succinate XL (Toprol-XL) 50 mg 24 hr tablet, Take 1 tablet (50 mg) by mouth once daily. Do not crush or chew., Disp: , Rfl:     montelukast (Singulair) 10 mg tablet, Take 1 tablet (10 mg) by mouth once daily., Disp: , Rfl:     NovoLOG FlexPen U-100 Insulin 100 unit/mL (3 mL) pen, INJECT 5 UNITS SUBCUTANEOUSLY WITH BREAKFAST, 10 UNITS WITH LUNCH, AND 20 UNITS WITH DINNER, Disp: , Rfl:     sevelamer carbonate (Renvela) 800 mg tablet, Take 1 tablet (800 mg) by mouth 3 times a day with meals. Swallow tablet whole; do not crush, break, or chew., Disp: , Rfl:     sodium bicarbonate 650 mg tablet, Take 2 tablets (1,300 mg) by mouth 2 times a day., Disp: , Rfl:     UltiCare Pen Needle 31 gauge x 1/4\" needle, TEST 5 TIMES A DAY, Disp: , Rfl:     Ventolin HFA 90 mcg/actuation inhaler, Inhale 2 puffs 4 times a day as needed., Disp: , Rfl:    Tobacco Use: Medium Risk (2/7/2024)    Patient History     Smoking Tobacco Use: Former     Smokeless Tobacco Use: Never     Passive Exposure: Not on file      Alcohol Use: Not At Risk (12/29/2023)    AUDIT-C     Frequency of Alcohol Consumption: Never     " Average Number of Drinks: Patient does not drink     Frequency of Binge Drinking: Never      Social History     Substance and Sexual Activity   Drug Use Never        Review of Systems   All other systems negative.     Objective   Visit Vitals  OB Status Implant   Smoking Status Former        PHYSICAL EXAM:  General appearance: Appears well, well-nourished, well groomed. No acute distress.   Constitutional: No fever, chills, weight loss or weight gain.  Communication: Normal communication  Psychiatric: Oriented to person, place and time. Normal mood and affect.  Neurologic: Cranial nerves II-XII grossly intact and symmetric bilaterally.  Cardiovascular: Examination of peripheral vascular system shows no clubbing or cyanosis.  Respiratory: No respiratory distress increased work of breathing. Inspection of the chest with symmetric chest expansion and normal respiratory effort.  Skin: No head and neck rashes.  Head: Normocephalic. Atraumatic with no masses, lesions or scarring.  Face: Normal symmetry. No scars or deformities.  Eyes: Conjunctiva not edematous or erythematous. PERRLA  Neck: Supple and symmetric, trachea midline. Lymph nodes with no adenopathy.  Head: Normocephalic. Atraumatic with no masses, lesions or scarring.  Eyes: PERRL, EOMI, Conjunctiva is clear. No nystagmus.  Nose: External inspection of nose: No nasal lesions, lacerations or scars.   Throat:  Floor of mouth is clear, no masses.  Tongue appears normal, no lesions or masses. Gums, gingiva, buccal mucosa appear pink and moist, no lesions. Teeth are in intact.  No obvious dental infections.  Peritonsillar regions appear symmetric without swelling.  Hard and soft palate appear normal, no obvious cleft. Uvula is midline.  Oropharynx: No lesions. Retropharyngeal wall is flat.  No postnasal drip.  Salivary Glands: Symmetric bilaterally.  No palpable masses.  No evidence of acute infection or salivary stones.  TMJ: Normal, no trismus.  Right Ear:  External inspection of ear with no deformity, scars, or masses. Mastoid is nontender. External auditory canal is clear. TM with PE tube in place that is occluded with cerumen but with no sign of infection, effusion, or retraction.  Left Ear: External inspection of ear with no deformity, scars, or masses. Mastoid is nontender. External auditory canal is clear.    TM is intact and appears white/opaque with keratinous nodule on the anteroinferior aspect of TM with no sign of infection, effusion, or retraction. No perforation seen. Auto insufflation visible not under microscopy.    TUNING FORK EXAM: Sommers test lateralizes to the right. Rinne test with BC>AC bilaterally.     RESULTS: No audiogram available for review.     Norma Odom, APRN-CNP

## 2024-04-09 ENCOUNTER — APPOINTMENT (OUTPATIENT)
Dept: RADIOLOGY | Facility: HOSPITAL | Age: 39
End: 2024-04-09
Payer: COMMERCIAL

## 2024-04-09 ENCOUNTER — APPOINTMENT (OUTPATIENT)
Dept: OTOLARYNGOLOGY | Facility: CLINIC | Age: 39
End: 2024-04-09
Payer: COMMERCIAL

## 2024-04-09 ENCOUNTER — APPOINTMENT (OUTPATIENT)
Dept: AUDIOLOGY | Facility: CLINIC | Age: 39
End: 2024-04-09
Payer: COMMERCIAL

## 2024-04-09 ENCOUNTER — HOSPITAL ENCOUNTER (EMERGENCY)
Facility: HOSPITAL | Age: 39
Discharge: HOME | End: 2024-04-10
Attending: STUDENT IN AN ORGANIZED HEALTH CARE EDUCATION/TRAINING PROGRAM
Payer: COMMERCIAL

## 2024-04-09 VITALS
HEIGHT: 67 IN | DIASTOLIC BLOOD PRESSURE: 83 MMHG | BODY MASS INDEX: 43.95 KG/M2 | SYSTOLIC BLOOD PRESSURE: 159 MMHG | HEART RATE: 97 BPM | WEIGHT: 280 LBS | OXYGEN SATURATION: 100 % | RESPIRATION RATE: 16 BRPM | TEMPERATURE: 98.1 F

## 2024-04-09 DIAGNOSIS — M79.602 PAIN OF LEFT UPPER EXTREMITY: Primary | ICD-10-CM

## 2024-04-09 LAB
ALBUMIN SERPL BCP-MCNC: 3.7 G/DL (ref 3.4–5)
ALP SERPL-CCNC: 86 U/L (ref 33–110)
ALT SERPL W P-5'-P-CCNC: 3 U/L (ref 7–45)
ANION GAP SERPL CALC-SCNC: 16 MMOL/L (ref 10–20)
AST SERPL W P-5'-P-CCNC: 10 U/L (ref 9–39)
B-HCG SERPL-ACNC: 2 MIU/ML
BASOPHILS # BLD AUTO: 0.07 X10*3/UL (ref 0–0.1)
BASOPHILS NFR BLD AUTO: 0.5 %
BILIRUB SERPL-MCNC: 0.4 MG/DL (ref 0–1.2)
BUN SERPL-MCNC: 49 MG/DL (ref 6–23)
CALCIUM SERPL-MCNC: 8.2 MG/DL (ref 8.6–10.3)
CHLORIDE SERPL-SCNC: 101 MMOL/L (ref 98–107)
CO2 SERPL-SCNC: 23 MMOL/L (ref 21–32)
CREAT SERPL-MCNC: 7.21 MG/DL (ref 0.5–1.05)
EGFRCR SERPLBLD CKD-EPI 2021: 7 ML/MIN/1.73M*2
EOSINOPHIL # BLD AUTO: 0.36 X10*3/UL (ref 0–0.7)
EOSINOPHIL NFR BLD AUTO: 2.6 %
ERYTHROCYTE [DISTWIDTH] IN BLOOD BY AUTOMATED COUNT: 14.6 % (ref 11.5–14.5)
GLUCOSE SERPL-MCNC: 138 MG/DL (ref 74–99)
HCT VFR BLD AUTO: 30.9 % (ref 36–46)
HGB BLD-MCNC: 9.4 G/DL (ref 12–16)
IMM GRANULOCYTES # BLD AUTO: 0.04 X10*3/UL (ref 0–0.7)
IMM GRANULOCYTES NFR BLD AUTO: 0.3 % (ref 0–0.9)
LYMPHOCYTES # BLD AUTO: 4.1 X10*3/UL (ref 1.2–4.8)
LYMPHOCYTES NFR BLD AUTO: 29.4 %
MCH RBC QN AUTO: 27.6 PG (ref 26–34)
MCHC RBC AUTO-ENTMCNC: 30.4 G/DL (ref 32–36)
MCV RBC AUTO: 91 FL (ref 80–100)
MONOCYTES # BLD AUTO: 0.73 X10*3/UL (ref 0.1–1)
MONOCYTES NFR BLD AUTO: 5.2 %
NEUTROPHILS # BLD AUTO: 8.66 X10*3/UL (ref 1.2–7.7)
NEUTROPHILS NFR BLD AUTO: 62 %
NRBC BLD-RTO: 0 /100 WBCS (ref 0–0)
PLATELET # BLD AUTO: 200 X10*3/UL (ref 150–450)
POTASSIUM SERPL-SCNC: 4.3 MMOL/L (ref 3.5–5.3)
PROT SERPL-MCNC: 7.1 G/DL (ref 6.4–8.2)
RBC # BLD AUTO: 3.4 X10*6/UL (ref 4–5.2)
SODIUM SERPL-SCNC: 136 MMOL/L (ref 136–145)
WBC # BLD AUTO: 14 X10*3/UL (ref 4.4–11.3)

## 2024-04-09 PROCEDURE — 2550000001 HC RX 255 CONTRASTS: Performed by: STUDENT IN AN ORGANIZED HEALTH CARE EDUCATION/TRAINING PROGRAM

## 2024-04-09 PROCEDURE — 84702 CHORIONIC GONADOTROPIN TEST: CPT | Performed by: STUDENT IN AN ORGANIZED HEALTH CARE EDUCATION/TRAINING PROGRAM

## 2024-04-09 PROCEDURE — 96374 THER/PROPH/DIAG INJ IV PUSH: CPT | Mod: 59

## 2024-04-09 PROCEDURE — 99284 EMERGENCY DEPT VISIT MOD MDM: CPT | Mod: 25

## 2024-04-09 PROCEDURE — 73206 CT ANGIO UPR EXTRM W/O&W/DYE: CPT | Mod: LEFT SIDE | Performed by: STUDENT IN AN ORGANIZED HEALTH CARE EDUCATION/TRAINING PROGRAM

## 2024-04-09 PROCEDURE — 80053 COMPREHEN METABOLIC PANEL: CPT | Performed by: STUDENT IN AN ORGANIZED HEALTH CARE EDUCATION/TRAINING PROGRAM

## 2024-04-09 PROCEDURE — 36415 COLL VENOUS BLD VENIPUNCTURE: CPT | Performed by: STUDENT IN AN ORGANIZED HEALTH CARE EDUCATION/TRAINING PROGRAM

## 2024-04-09 PROCEDURE — 96375 TX/PRO/DX INJ NEW DRUG ADDON: CPT | Mod: 59

## 2024-04-09 PROCEDURE — 73206 CT ANGIO UPR EXTRM W/O&W/DYE: CPT | Mod: LT

## 2024-04-09 PROCEDURE — 2500000004 HC RX 250 GENERAL PHARMACY W/ HCPCS (ALT 636 FOR OP/ED): Performed by: STUDENT IN AN ORGANIZED HEALTH CARE EDUCATION/TRAINING PROGRAM

## 2024-04-09 PROCEDURE — 85025 COMPLETE CBC W/AUTO DIFF WBC: CPT | Performed by: STUDENT IN AN ORGANIZED HEALTH CARE EDUCATION/TRAINING PROGRAM

## 2024-04-09 RX ORDER — ONDANSETRON HYDROCHLORIDE 2 MG/ML
4 INJECTION, SOLUTION INTRAVENOUS ONCE
Status: COMPLETED | OUTPATIENT
Start: 2024-04-09 | End: 2024-04-09

## 2024-04-09 RX ORDER — MORPHINE SULFATE 4 MG/ML
4 INJECTION, SOLUTION INTRAMUSCULAR; INTRAVENOUS ONCE
Status: COMPLETED | OUTPATIENT
Start: 2024-04-09 | End: 2024-04-09

## 2024-04-09 RX ADMIN — MORPHINE SULFATE 4 MG: 4 INJECTION, SOLUTION INTRAMUSCULAR; INTRAVENOUS at 21:12

## 2024-04-09 RX ADMIN — ONDANSETRON 4 MG: 2 INJECTION INTRAMUSCULAR; INTRAVENOUS at 21:13

## 2024-04-09 RX ADMIN — IOHEXOL 100 ML: 350 INJECTION, SOLUTION INTRAVENOUS at 21:29

## 2024-04-09 ASSESSMENT — PAIN DESCRIPTION - ORIENTATION: ORIENTATION: LEFT

## 2024-04-09 ASSESSMENT — COLUMBIA-SUICIDE SEVERITY RATING SCALE - C-SSRS
6. HAVE YOU EVER DONE ANYTHING, STARTED TO DO ANYTHING, OR PREPARED TO DO ANYTHING TO END YOUR LIFE?: NO
2. HAVE YOU ACTUALLY HAD ANY THOUGHTS OF KILLING YOURSELF?: NO
1. IN THE PAST MONTH, HAVE YOU WISHED YOU WERE DEAD OR WISHED YOU COULD GO TO SLEEP AND NOT WAKE UP?: NO

## 2024-04-09 ASSESSMENT — PAIN - FUNCTIONAL ASSESSMENT
PAIN_FUNCTIONAL_ASSESSMENT: 0-10
PAIN_FUNCTIONAL_ASSESSMENT: 0-10

## 2024-04-09 ASSESSMENT — PAIN SCALES - GENERAL
PAINLEVEL_OUTOF10: 6
PAINLEVEL_OUTOF10: 8

## 2024-04-09 ASSESSMENT — PAIN DESCRIPTION - FREQUENCY: FREQUENCY: CONSTANT/CONTINUOUS

## 2024-04-09 ASSESSMENT — PAIN DESCRIPTION - PAIN TYPE: TYPE: ACUTE PAIN

## 2024-04-09 ASSESSMENT — PAIN DESCRIPTION - DESCRIPTORS: DESCRIPTORS: BURNING

## 2024-04-09 ASSESSMENT — PAIN DESCRIPTION - LOCATION: LOCATION: ARM

## 2024-04-10 PROCEDURE — 2500000001 HC RX 250 WO HCPCS SELF ADMINISTERED DRUGS (ALT 637 FOR MEDICARE OP): Performed by: EMERGENCY MEDICINE

## 2024-04-10 RX ADMIN — APIXABAN 10 MG: 5 TABLET, FILM COATED ORAL at 01:00

## 2024-04-10 NOTE — ED PROVIDER NOTES
CT of the upper extremity reveals patent graft concerning proximal left axillary vein and distal left brachial artery there is some narrowing of blood vessels at the anastomosis of the fistula without any evidence of significant hemodynamic stenosis this AV graft reveals no evidence of occlusion or thrombus no arterial dissection no intimal injury pseudoaneurysm or extravasation the basic vein is slightly narrowed possible thrombus.    Results of testing were discussed with the patient Eliquis 10 mg was given recommend follow-up with vascular surgeon at [facility]     Christopher June MD  04/10/24 4886

## 2024-04-10 NOTE — ED PROVIDER NOTES
HPI   Chief Complaint   Patient presents with    Post-op Problem     I had a fistula put in my left arm, it feels tight, swollen, I'm afraid maybe it's infected. No drainage or swelling seen in triage. Limb alert band applied to left arm.       Patient is a 38-year-old female with history of ESRD on dialysis Monday Wednesday Friday, last dialysis on April 7, hypertension, asthma, pulmonary embolism on Eliquis, diabetes who presents for left upper extremity pain.  Patient states she had a recent fistula placed on April 3 at [facility] by Dr. Lawson.  States for the last several days she has had pain and swelling in the left upper extremity as well as spots of numbness.  States she had similar episodes in the past when she had a complication with her fistula and had to be removed.  States that there was blood not flowing properly.  States she has been off her Eliquis, was supposed to start yesterday, has not taken it dose yet.  Endorses chills occasionally, no fevers, chest pain, shortness of breath, vomiting, diarrhea.  No IV drugs.  Endorses tobacco.                          [place] Coma Scale Score: 15                     Patient History   Past Medical History:   Diagnosis Date    Asthma     Diabetes mellitus (CMS/HCC)     Hyperlipemia     Hypertension      History reviewed. No pertinent surgical history.  No family history on file.  Social History     Tobacco Use    Smoking status: Former     Types: Cigarettes    Smokeless tobacco: Never   Substance Use Topics    Alcohol use: Never    Drug use: Never       Physical Exam   ED Triage Vitals [04/09/24 1942]   Temperature Heart Rate Respirations BP   36.7 °C (98.1 °F) 100 16 166/88      Pulse Ox Temp Source Heart Rate Source Patient Position   100 % Temporal Monitor Sitting      BP Location FiO2 (%)     Right arm --       Physical Exam  Constitutional:       General: She is not in acute distress.  HENT:      Head: Normocephalic.   Eyes:      Extraocular Movements:  Extraocular movements intact.      Conjunctiva/sclera: Conjunctivae normal.      Pupils: Pupils are equal, round, and reactive to light.   Cardiovascular:      Rate and Rhythm: Normal rate and regular rhythm.      Pulses: Normal pulses.      Heart sounds: Normal heart sounds.   Pulmonary:      Effort: Pulmonary effort is normal.      Breath sounds: Normal breath sounds.   Abdominal:      General: There is no distension.      Palpations: Abdomen is soft. There is no mass.      Tenderness: There is no abdominal tenderness. There is no guarding.   Musculoskeletal:         General: No deformity.      Cervical back: Normal range of motion and neck supple.      Right lower leg: No edema.      Left lower leg: No edema.      Comments: 2+ right radial, 2 left radial.  No palpable thrill over fistula area.  Bedside US with point of significantly decreased blood flow near mid point between surgical wounds. Tenderness palpation over left upper extremity diffusely, mild edema.  No crepitus/necrotic tissue/bullae.  Mild warmth of the left upper extremity.     Skin:     General: Skin is warm and dry.      Findings: No lesion or rash.   Neurological:      General: No focal deficit present.      Mental Status: She is alert and oriented to person, place, and time. Mental status is at baseline.      Cranial Nerves: No cranial nerve deficit.      Sensory: No sensory deficit.      Motor: No weakness.      Comments: Mildly decreased sensation in left hand versus right.   Psychiatric:         Mood and Affect: Mood normal.         ED Course & MDM   Diagnoses as of 04/10/24 0737   Pain of left upper extremity       Medical Decision Making  Patient is a 38-year-old female with above-stated past medical history presents for left upper extremity pain.  CMP is appropriate for patient's ESRD status.  Potassium is within normal limits.  hCG is not consistent with intrauterine or ectopic pregnancy.  CBC shows a hemoglobin near her baseline and a  mild leukocytosis which may be reactionary.  CT angio of the left upper extremity was ordered after discussion with the radiologist by the radiology technicians.  Patient was handed off pending CT scan results, reevaluation and disposition.    Disclaimer: This note was dictated using speech recognition software. Minor errors in transcription may be present. Please call if questions.     Denzel Crouch MD  [facility] Emergency Medicine  Contact on [employer]        Problems Addressed:  Pain of left upper extremity: acute illness or injury    Amount and/or Complexity of Data Reviewed  Labs: ordered.  Radiology: ordered.        Procedure  Procedures     Denzel Crouch MD  04/10/24 0724

## 2024-04-22 ENCOUNTER — APPOINTMENT (OUTPATIENT)
Dept: RADIOLOGY | Facility: HOSPITAL | Age: 39
End: 2024-04-22
Payer: COMMERCIAL

## 2024-05-02 ENCOUNTER — APPOINTMENT (OUTPATIENT)
Dept: AUDIOLOGY | Facility: CLINIC | Age: 39
End: 2024-05-02
Payer: COMMERCIAL

## 2024-05-02 ENCOUNTER — APPOINTMENT (OUTPATIENT)
Dept: OTOLARYNGOLOGY | Facility: CLINIC | Age: 39
End: 2024-05-02
Payer: COMMERCIAL

## 2024-05-10 ENCOUNTER — HOSPITAL ENCOUNTER (EMERGENCY)
Facility: HOSPITAL | Age: 39
Discharge: HOME | End: 2024-05-10
Attending: STUDENT IN AN ORGANIZED HEALTH CARE EDUCATION/TRAINING PROGRAM
Payer: COMMERCIAL

## 2024-05-10 ENCOUNTER — APPOINTMENT (OUTPATIENT)
Dept: RADIOLOGY | Facility: HOSPITAL | Age: 39
End: 2024-05-10
Payer: COMMERCIAL

## 2024-05-10 ENCOUNTER — APPOINTMENT (OUTPATIENT)
Dept: CARDIOLOGY | Facility: HOSPITAL | Age: 39
End: 2024-05-10
Payer: COMMERCIAL

## 2024-05-10 VITALS
HEART RATE: 85 BPM | HEIGHT: 67 IN | BODY MASS INDEX: 45.36 KG/M2 | DIASTOLIC BLOOD PRESSURE: 77 MMHG | OXYGEN SATURATION: 99 % | WEIGHT: 289 LBS | TEMPERATURE: 97.7 F | RESPIRATION RATE: 16 BRPM | SYSTOLIC BLOOD PRESSURE: 148 MMHG

## 2024-05-10 DIAGNOSIS — T82.9XXA COMPLICATION OF VASCULAR DIALYSIS CATHETER, UNSPECIFIED COMPLICATION, INITIAL ENCOUNTER: ICD-10-CM

## 2024-05-10 DIAGNOSIS — R07.89 CHEST WALL PAIN: Primary | ICD-10-CM

## 2024-05-10 LAB
ALBUMIN SERPL BCP-MCNC: 3.5 G/DL (ref 3.4–5)
ALP SERPL-CCNC: 77 U/L (ref 33–110)
ALT SERPL W P-5'-P-CCNC: 4 U/L (ref 7–45)
ANION GAP SERPL CALC-SCNC: 13 MMOL/L (ref 10–20)
AST SERPL W P-5'-P-CCNC: 9 U/L (ref 9–39)
ATRIAL RATE: 86 BPM
ATRIAL RATE: 94 BPM
BASOPHILS # BLD AUTO: 0.05 X10*3/UL (ref 0–0.1)
BASOPHILS NFR BLD AUTO: 0.4 %
BILIRUB SERPL-MCNC: 0.5 MG/DL (ref 0–1.2)
BUN SERPL-MCNC: 53 MG/DL (ref 6–23)
CALCIUM SERPL-MCNC: 8.2 MG/DL (ref 8.6–10.3)
CARDIAC TROPONIN I PNL SERPL HS: 13 NG/L (ref 0–13)
CARDIAC TROPONIN I PNL SERPL HS: 13 NG/L (ref 0–13)
CHLORIDE SERPL-SCNC: 103 MMOL/L (ref 98–107)
CO2 SERPL-SCNC: 23 MMOL/L (ref 21–32)
CREAT SERPL-MCNC: 6.34 MG/DL (ref 0.5–1.05)
EGFRCR SERPLBLD CKD-EPI 2021: 8 ML/MIN/1.73M*2
EOSINOPHIL # BLD AUTO: 0.34 X10*3/UL (ref 0–0.7)
EOSINOPHIL NFR BLD AUTO: 2.9 %
ERYTHROCYTE [DISTWIDTH] IN BLOOD BY AUTOMATED COUNT: 14 % (ref 11.5–14.5)
GLUCOSE SERPL-MCNC: 192 MG/DL (ref 74–99)
HCT VFR BLD AUTO: 30.2 % (ref 36–46)
HGB BLD-MCNC: 9.3 G/DL (ref 12–16)
IMM GRANULOCYTES # BLD AUTO: 0.03 X10*3/UL (ref 0–0.7)
IMM GRANULOCYTES NFR BLD AUTO: 0.3 % (ref 0–0.9)
INR PPP: 1.4 (ref 0.9–1.1)
LACTATE SERPL-SCNC: 0.6 MMOL/L (ref 0.4–2)
LYMPHOCYTES # BLD AUTO: 2.81 X10*3/UL (ref 1.2–4.8)
LYMPHOCYTES NFR BLD AUTO: 23.9 %
MAGNESIUM SERPL-MCNC: 1.52 MG/DL (ref 1.6–2.4)
MCH RBC QN AUTO: 28.4 PG (ref 26–34)
MCHC RBC AUTO-ENTMCNC: 30.8 G/DL (ref 32–36)
MCV RBC AUTO: 92 FL (ref 80–100)
MONOCYTES # BLD AUTO: 0.52 X10*3/UL (ref 0.1–1)
MONOCYTES NFR BLD AUTO: 4.4 %
NEUTROPHILS # BLD AUTO: 8.02 X10*3/UL (ref 1.2–7.7)
NEUTROPHILS NFR BLD AUTO: 68.1 %
NRBC BLD-RTO: 0 /100 WBCS (ref 0–0)
P AXIS: 58 DEGREES
P AXIS: 62 DEGREES
P OFFSET: 193 MS
P OFFSET: 197 MS
P ONSET: 149 MS
P ONSET: 150 MS
PLATELET # BLD AUTO: 157 X10*3/UL (ref 150–450)
POTASSIUM SERPL-SCNC: 4.1 MMOL/L (ref 3.5–5.3)
PR INTERVAL: 150 MS
PR INTERVAL: 156 MS
PROT SERPL-MCNC: 6.5 G/DL (ref 6.4–8.2)
PROTHROMBIN TIME: 15.5 SECONDS (ref 9.8–12.8)
Q ONSET: 224 MS
Q ONSET: 228 MS
QRS COUNT: 14 BEATS
QRS COUNT: 15 BEATS
QRS DURATION: 74 MS
QRS DURATION: 76 MS
QT INTERVAL: 356 MS
QT INTERVAL: 374 MS
QTC CALCULATION(BAZETT): 445 MS
QTC CALCULATION(BAZETT): 447 MS
QTC FREDERICIA: 413 MS
QTC FREDERICIA: 421 MS
R AXIS: -14 DEGREES
R AXIS: -9 DEGREES
RBC # BLD AUTO: 3.28 X10*6/UL (ref 4–5.2)
SODIUM SERPL-SCNC: 135 MMOL/L (ref 136–145)
T AXIS: 34 DEGREES
T AXIS: 38 DEGREES
T OFFSET: 406 MS
T OFFSET: 411 MS
VENTRICULAR RATE: 86 BPM
VENTRICULAR RATE: 94 BPM
WBC # BLD AUTO: 11.8 X10*3/UL (ref 4.4–11.3)

## 2024-05-10 PROCEDURE — 2500000004 HC RX 250 GENERAL PHARMACY W/ HCPCS (ALT 636 FOR OP/ED): Performed by: RADIOLOGY

## 2024-05-10 PROCEDURE — 84484 ASSAY OF TROPONIN QUANT: CPT | Performed by: REGISTERED NURSE

## 2024-05-10 PROCEDURE — 96375 TX/PRO/DX INJ NEW DRUG ADDON: CPT | Mod: 59

## 2024-05-10 PROCEDURE — 36415 COLL VENOUS BLD VENIPUNCTURE: CPT | Performed by: REGISTERED NURSE

## 2024-05-10 PROCEDURE — 2500000005 HC RX 250 GENERAL PHARMACY W/O HCPCS: Performed by: RADIOLOGY

## 2024-05-10 PROCEDURE — 2550000001 HC RX 255 CONTRASTS: Performed by: STUDENT IN AN ORGANIZED HEALTH CARE EDUCATION/TRAINING PROGRAM

## 2024-05-10 PROCEDURE — 77001 FLUOROGUIDE FOR VEIN DEVICE: CPT | Performed by: RADIOLOGY

## 2024-05-10 PROCEDURE — 71275 CT ANGIOGRAPHY CHEST: CPT | Performed by: RADIOLOGY

## 2024-05-10 PROCEDURE — 85025 COMPLETE CBC W/AUTO DIFF WBC: CPT | Performed by: REGISTERED NURSE

## 2024-05-10 PROCEDURE — 71275 CT ANGIOGRAPHY CHEST: CPT

## 2024-05-10 PROCEDURE — 83735 ASSAY OF MAGNESIUM: CPT | Performed by: REGISTERED NURSE

## 2024-05-10 PROCEDURE — 96374 THER/PROPH/DIAG INJ IV PUSH: CPT | Mod: 59

## 2024-05-10 PROCEDURE — 84075 ASSAY ALKALINE PHOSPHATASE: CPT | Performed by: REGISTERED NURSE

## 2024-05-10 PROCEDURE — 85610 PROTHROMBIN TIME: CPT | Performed by: REGISTERED NURSE

## 2024-05-10 PROCEDURE — 2500000004 HC RX 250 GENERAL PHARMACY W/ HCPCS (ALT 636 FOR OP/ED): Performed by: REGISTERED NURSE

## 2024-05-10 PROCEDURE — 2720000007 HC OR 272 NO HCPCS

## 2024-05-10 PROCEDURE — 99285 EMERGENCY DEPT VISIT HI MDM: CPT | Mod: 25

## 2024-05-10 PROCEDURE — 83605 ASSAY OF LACTIC ACID: CPT | Performed by: REGISTERED NURSE

## 2024-05-10 PROCEDURE — 93005 ELECTROCARDIOGRAM TRACING: CPT | Mod: 59

## 2024-05-10 PROCEDURE — 36581 REPLACE TUNNELED CV CATH: CPT | Performed by: RADIOLOGY

## 2024-05-10 PROCEDURE — C1750 CATH, HEMODIALYSIS,LONG-TERM: HCPCS

## 2024-05-10 PROCEDURE — 93005 ELECTROCARDIOGRAM TRACING: CPT

## 2024-05-10 RX ORDER — HEPARIN 100 UNIT/ML
5 SYRINGE INTRAVENOUS ONCE
Status: COMPLETED | OUTPATIENT
Start: 2024-05-10 | End: 2024-05-10

## 2024-05-10 RX ORDER — HYDROMORPHONE HYDROCHLORIDE 1 MG/ML
1 INJECTION, SOLUTION INTRAMUSCULAR; INTRAVENOUS; SUBCUTANEOUS ONCE
Status: COMPLETED | OUTPATIENT
Start: 2024-05-10 | End: 2024-05-10

## 2024-05-10 RX ORDER — MORPHINE SULFATE 4 MG/ML
4 INJECTION, SOLUTION INTRAMUSCULAR; INTRAVENOUS ONCE
Status: COMPLETED | OUTPATIENT
Start: 2024-05-10 | End: 2024-05-10

## 2024-05-10 RX ORDER — ONDANSETRON HYDROCHLORIDE 2 MG/ML
4 INJECTION, SOLUTION INTRAVENOUS ONCE
Status: COMPLETED | OUTPATIENT
Start: 2024-05-10 | End: 2024-05-10

## 2024-05-10 RX ORDER — ACETAMINOPHEN 325 MG/1
650 TABLET ORAL EVERY 6 HOURS PRN
Qty: 30 TABLET | Refills: 0 | Status: SHIPPED | OUTPATIENT
Start: 2024-05-10 | End: 2024-05-20

## 2024-05-10 RX ORDER — HEPARIN SODIUM 1000 [USP'U]/ML
INJECTION, SOLUTION INTRAVENOUS; SUBCUTANEOUS
Status: COMPLETED | OUTPATIENT
Start: 2024-05-10 | End: 2024-05-10

## 2024-05-10 RX ORDER — LIDOCAINE HYDROCHLORIDE 20 MG/ML
INJECTION, SOLUTION EPIDURAL; INFILTRATION; INTRACAUDAL; PERINEURAL
Status: COMPLETED | OUTPATIENT
Start: 2024-05-10 | End: 2024-05-10

## 2024-05-10 RX ADMIN — MORPHINE SULFATE 4 MG: 4 INJECTION, SOLUTION INTRAMUSCULAR; INTRAVENOUS at 08:05

## 2024-05-10 RX ADMIN — ONDANSETRON 4 MG: 2 INJECTION, SOLUTION INTRAMUSCULAR; INTRAVENOUS at 08:05

## 2024-05-10 RX ADMIN — IOHEXOL 75 ML: 350 INJECTION, SOLUTION INTRAVENOUS at 08:48

## 2024-05-10 RX ADMIN — HEPARIN SODIUM 4000 UNITS: 1000 INJECTION INTRAVENOUS; SUBCUTANEOUS at 13:20

## 2024-05-10 RX ADMIN — LIDOCAINE HYDROCHLORIDE 6 ML: 20 INJECTION, SOLUTION EPIDURAL; INFILTRATION; INTRACAUDAL; PERINEURAL at 12:58

## 2024-05-10 RX ADMIN — HYDROMORPHONE HYDROCHLORIDE 1 MG: 1 INJECTION, SOLUTION INTRAMUSCULAR; INTRAVENOUS; SUBCUTANEOUS at 09:40

## 2024-05-10 RX ADMIN — HEPARIN 500 UNITS: 100 SYRINGE at 09:52

## 2024-05-10 ASSESSMENT — LIFESTYLE VARIABLES
TOTAL SCORE: 0
HAVE PEOPLE ANNOYED YOU BY CRITICIZING YOUR DRINKING: NO
HAVE YOU EVER FELT YOU SHOULD CUT DOWN ON YOUR DRINKING: NO
EVER HAD A DRINK FIRST THING IN THE MORNING TO STEADY YOUR NERVES TO GET RID OF A HANGOVER: NO
EVER FELT BAD OR GUILTY ABOUT YOUR DRINKING: NO

## 2024-05-10 ASSESSMENT — PAIN SCALES - GENERAL
PAINLEVEL_OUTOF10: 3
PAINLEVEL_OUTOF10: 10 - WORST POSSIBLE PAIN
PAINLEVEL_OUTOF10: 10 - WORST POSSIBLE PAIN
PAINLEVEL_OUTOF10: 6
PAINLEVEL_OUTOF10: 9
PAINLEVEL_OUTOF10: 5 - MODERATE PAIN

## 2024-05-10 ASSESSMENT — PAIN DESCRIPTION - DESCRIPTORS
DESCRIPTORS: BURNING;ACHING
DESCRIPTORS: ACHING;BURNING
DESCRIPTORS: BURNING

## 2024-05-10 ASSESSMENT — PAIN - FUNCTIONAL ASSESSMENT
PAIN_FUNCTIONAL_ASSESSMENT: 0-10

## 2024-05-10 ASSESSMENT — PAIN DESCRIPTION - FREQUENCY: FREQUENCY: CONSTANT/CONTINUOUS

## 2024-05-10 ASSESSMENT — PAIN DESCRIPTION - ORIENTATION: ORIENTATION: LEFT

## 2024-05-10 ASSESSMENT — PAIN DESCRIPTION - PAIN TYPE: TYPE: ACUTE PAIN

## 2024-05-10 ASSESSMENT — PAIN DESCRIPTION - LOCATION: LOCATION: CHEST

## 2024-05-10 NOTE — Clinical Note
Patient in IR to have tunneled HD catheter that she states was placed in [place], [place] in June of 2023 exchanged to a new tunneled HD catheter d/t old catheter not functioning.

## 2024-05-10 NOTE — DISCHARGE INSTRUCTIONS
Reschedule tomorrow Esther on [address] behind Sydni Walter please be there by 10:45    Address: [facility] [address], [place]    Phone: [phone]

## 2024-05-10 NOTE — Clinical Note
The site was marked. Prepped: right chest and right neck. Prepped with: chlorhexidine. The patient was draped. Right tunneled HD catheter insertion site

## 2024-05-10 NOTE — ED PROVIDER NOTES
HPI   No chief complaint on file.        History provided by:  Patient and medical records   used: No      38-year-old female with past medical history significant for lipidemia, hypertension, diabetes, asthma, hemodialysis Monday Wednesday Friday presents emergency department today for evaluation of nausea and right-sided chest pain.  Patient tells me that she is feeling fine this morning until she went to dialysis.  Patient tells me while at dialysis she started feeling nauseated.  Patient tells me also while at dialysis they had trouble getting her port working.  Patient tells me they eventually got up working however shortly after her treatment began to work.  Patient tells me that the dialysis cath in her right chest was placed in June 2023.  Patient tells me that she has had a left upper arm fistula placed however this just happened in April and it has not been okayed for use yet.  Patient tells me that she has 10 out of 10 in the right side of her chest underneath her dialysis cath.  Patient denies any fever or chills.  Patient denies any shortness of breath.  Patient denies any abdominal pain, vomiting, or diarrhea.                  No data recorded                   Patient History   Past Medical History:   Diagnosis Date    Asthma ([facility])     Diabetes mellitus (Multi)     Hyperlipemia     Hypertension      No past surgical history on file.  No family history on file.  Social History     Tobacco Use    Smoking status: Former     Types: Cigarettes    Smokeless tobacco: Never   Substance Use Topics    Alcohol use: Never    Drug use: Never       Physical Exam   ED Triage Vitals   Temp Pulse Resp BP   -- -- -- --      SpO2 Temp src Heart Rate Source Patient Position   -- -- -- --      BP Location FiO2 (%)     -- --       Physical Exam  Vitals and nursing note reviewed.   Constitutional:       General: She is not in acute distress.     Appearance: Normal appearance. She is obese. She is not  ill-appearing.   HENT:      Head: Normocephalic and atraumatic.      Mouth/Throat:      Mouth: Mucous membranes are moist.   Cardiovascular:      Rate and Rhythm: Normal rate and regular rhythm.      Pulses: Normal pulses.   Pulmonary:      Effort: Pulmonary effort is normal.   Chest:      Chest wall: No swelling.   Breasts:     Right: Tenderness (Around right dialysis cath) present.       Abdominal:      Palpations: Abdomen is soft.   Skin:     General: Skin is warm and dry.      Capillary Refill: Capillary refill takes less than 2 seconds.   Neurological:      General: No focal deficit present.      Mental Status: She is alert and oriented to person, place, and time.   Psychiatric:         Mood and Affect: Mood normal.         Behavior: Behavior normal.       ED Course & MDM   Diagnoses as of 05/10/24 1347   Chest wall pain   Complication of vascular dialysis catheter, unspecified complication, initial encounter       Medical Decision Making  Patient seen examined emergency department; patient is nontoxic in appearance.  Patient's lung sounds are diminished in the bases however no adventitious noises are noted.  Abdomen is soft round and nontender with palpation.  Heart regular rate and rhythm without any murmur noted.  Patient's has right chest dialysis cath in place.  Area around the cath is tender to touch.  No producible pain noticed on the left.  Obtain CT of the chest to evaluate for any abscess or abnormality noted around the dialysis cath.  Will also order laboratory workup including lactate, blood cultures, troponin, CBC, magnesium.  Patient's symptoms treated with IV Zofran, and IV morphine.    EKG at 08:03 with ventricular rate of 94, as interpreted by me, shows a normal rate and rhythm with sinus arrhythmia, normal axis, normal intervals. And normal ST and T wave pattern with no evidence of acute ischemia or other acute findings    EKG at 10:04 with ventricular rate of 86, as interpreted by me, shows a  normal rate and rhythm with PACs, normal axis, normal intervals. And normal ST and T wave pattern with no evidence of acute ischemia or other acute findings    Patient CMP significant for BUN/creatinine 53/6.34 which is consistent with patient's end-stage renal disease, magnesium is 1.5 for which she was given oral Lokelma short 4.  Lactate is 0.6.  CBC significant for mild leukocytosis with a H/H of 9.3/30.2.  CT angio with diffuse chest wall edema otherwise no significant findings.    1022 - I did place a call to patient's dialysis nurse at [facility].  She tells me that over the last week her chest catheter has been positional.  She tells me this morning she was unable to run at the ordered rate.  She relates to me that the nurse practitioner at this facility was worried that the catheter site was going bad.  She also tells me that patient typically does not complain of pain.  She tells me the patient's nephrologist is Dr. Mclaughlin    1135 -patient discussed with Dr. Mclaughlin who would like her to go to IR to have her dialysis cath replaced or have a revision performed.    1130 -I did work with Cierra from  to arrange for patient to go over to  for the interventional radiologist to attempt to revise or replace patient's catheter.  In the meantime while I was waiting for this to be determined I did call Candice at [facility] and spoke to the niece and is able to arrange for patient to place on the dialysis schedule at another [facility] location at 1045 tomorrow.    1400 - I was notified by [facility] IR nurse that the interventional radiologist was able to exchange the dialysis catheter.    I did relay to patient and her partner that I was able to have an appointment scheduled for her tomorrow.  I did discuss the location and provided an address and phone number for her.  Additionally I added to this to patient's discharge med rec.  Nursing staff did fax over the interventional radiologist note stating that it was okay to  use the dialysis catheter to [facility].  Patient sent with prescription for acetaminophen for pain relief at home.  All patient's questions and concerns were addressed prior to discharge.  Patient discharged home in stable condition.        Procedure  Procedures     Em Quarles, AHMET-CNP  05/10/24 1403

## 2024-05-10 NOTE — Clinical Note
Arrow Vadim VectorFlow Antegrade Hemodialysis Catheter inserted 15 F 23 cm tip to cuff.  Catheter secured with sutures. Patient tolerated well.

## 2024-05-10 NOTE — Clinical Note
Lidocaine given to numb site where tunneled catheter is inserted and where new tunneled catheter will be exchanged. Patient tolerated well.

## 2024-05-10 NOTE — Clinical Note
Pressure held until hemostasis achieved via Melani Lao rad tech.  Dry, sterile dressing applied. Patient tolerated well.  Patient will be transferred back to ED.  Report given to Em Quarles.

## 2024-05-10 NOTE — POST-PROCEDURE NOTE
Interventional Radiology Brief Postprocedure Note    Attending: Schoenberger    Assistant: None    Diagnosis: Catheter Malfunction. Renal Failure    Description of procedure: Flouro Guided Tunneled Dialysis Catheter Exchange     Anesthesia:  Local    Complications: None    Estimated Blood Loss: minimal    Medications  As of 05/10/24 1345      morphine injection 4 mg (mg) Total dose:  4 mg      Date/Time Rate/Dose/Volume Action       05/10/24  0805 4 mg Given               ondansetron (Zofran) injection 4 mg (mg) Total dose:  4 mg      Date/Time Rate/Dose/Volume Action       05/10/24  0805 4 mg Given               iohexol (OMNIPaque) 350 mg iodine/mL solution 75 mL (mL) Total volume:  75 mL Dosing weight:  131      Date/Time Rate/Dose/Volume Action       05/10/24  0848 75 mL Given               heparin flush 100 unit/mL syringe 500 Units (Units) Total volume:  5 mL Dosing weight:  131      Date/Time Rate/Dose/Volume Action       05/10/24  0952 500 Units Given               heparin flush 100 unit/mL syringe 500 Units (Units) Total volume:  5 mL Dosing weight:  131      Date/Time Rate/Dose/Volume Action       05/10/24  0952 500 Units Given               HYDROmorphone (Dilaudid) injection 1 mg (mg) Total dose:  1 mg Dosing weight:  131      Date/Time Rate/Dose/Volume Action       05/10/24  0940 1 mg Given               heparin 1,000 unit/mL injection (Units) Total dose:  4,000 Units      Date/Time Rate/Dose/Volume Action       05/10/24  1320 4,000 Units Given               lidocaine PF (Xylocaine) 20 mg/mL (2 %) injection (mL) Total volume:  6 mL      Date/Time Rate/Dose/Volume Action       05/10/24  1258 6 mL Given                   No specimens collected      See detailed result report with images in PACS.    The patient tolerated the procedure well without incident or complication and is in stable condition.     Catheter tip Upper [address] [facility]. Ready to use for Dialysis

## 2024-05-13 ENCOUNTER — TELEPHONE (OUTPATIENT)
Dept: SURGERY | Facility: CLINIC | Age: 39
End: 2024-05-13
Payer: COMMERCIAL

## 2024-05-13 PROBLEM — N93.9 ABNORMAL UTERINE BLEEDING: Status: ACTIVE | Noted: 2023-06-18

## 2024-05-13 PROBLEM — E83.9: Status: ACTIVE | Noted: 2023-12-05

## 2024-05-13 PROBLEM — E55.9 VITAMIN D DEFICIENCY: Status: ACTIVE | Noted: 2018-09-04

## 2024-05-13 PROBLEM — K21.9 GERD (GASTROESOPHAGEAL REFLUX DISEASE): Status: ACTIVE | Noted: 2024-03-05

## 2024-05-13 PROBLEM — R03.0 ELEVATED BLOOD-PRESSURE READING WITHOUT DIAGNOSIS OF HYPERTENSION: Status: ACTIVE | Noted: 2019-01-02

## 2024-05-13 PROBLEM — E21.3 HYPERPARATHYROIDISM (MULTI): Status: ACTIVE | Noted: 2024-03-04

## 2024-05-13 PROBLEM — N18.6 END STAGE RENAL DISEASE (MULTI): Status: ACTIVE | Noted: 2023-06-18

## 2024-05-13 PROBLEM — I10 ESSENTIAL HYPERTENSION: Status: ACTIVE | Noted: 2018-09-04

## 2024-05-13 PROBLEM — N25.81 SECONDARY HYPERPARATHYROIDISM OF RENAL ORIGIN (MULTI): Status: ACTIVE | Noted: 2022-07-14

## 2024-05-13 PROBLEM — E11.21 DIABETIC NEPHROPATHY ASSOCIATED WITH TYPE 2 DIABETES MELLITUS (MULTI): Status: ACTIVE | Noted: 2018-09-04

## 2024-05-13 PROBLEM — D63.1 ANEMIA IN STAGE 5 CHRONIC KIDNEY DISEASE, NOT ON CHRONIC DIALYSIS (MULTI): Status: ACTIVE | Noted: 2022-07-14

## 2024-05-13 PROBLEM — E78.00 HIGH CHOLESTEROL: Status: ACTIVE | Noted: 2023-12-05

## 2024-05-13 PROBLEM — H91.90 HEARING LOSS: Status: ACTIVE | Noted: 2024-05-13

## 2024-05-13 PROBLEM — I26.99 PULMONARY EMBOLISM (MULTI): Status: ACTIVE | Noted: 2023-11-17

## 2024-05-13 PROBLEM — T82.898A STEAL SYNDROME AS COMPLICATION OF DIALYSIS ACCESS (CMS-HCC): Status: ACTIVE | Noted: 2022-10-01

## 2024-05-13 PROBLEM — I42.9 CARDIOMYOPATHY (MULTI): Status: ACTIVE | Noted: 2023-11-17

## 2024-05-13 PROBLEM — G47.33 OBSTRUCTIVE SLEEP APNEA (ADULT) (PEDIATRIC): Status: ACTIVE | Noted: 2023-12-05

## 2024-05-13 PROBLEM — N18.5 ANEMIA IN STAGE 5 CHRONIC KIDNEY DISEASE, NOT ON CHRONIC DIALYSIS (MULTI): Status: ACTIVE | Noted: 2022-07-14

## 2024-05-13 NOTE — PROGRESS NOTES
Subjective     Date: 5/16/2024 Time: 1:11 PM  Name: Alexus Haley  MRN: [id number]    This is a 38 y.o. female with morbid obesity (Body mass index is 43.79 kg/m².) who presents to clinic for consideration of bariatric surgery. she has attempted and failed multiple diet and exercise regimens for weight loss. Initial Onset of obesity was in childhood.  Their goal for surgery is to  be healthier , lose weight, and decrease medications . The patient has tried multiple diets to lose weight including Exercise, Weight Watchers, and keto . The patient was most successful with  None . The most pounds lost on this diet were 2-3 lbs. The patient considers their dietary weakness to be  only eating dinner and snacking throughout day  The patient reports a  highest weight ever of 290 pounds and lowest weight ever of 250 pounds Distribution of Obesity: is central. The patient exercises daily  30-40 Minutes/Day Types of Exercise : walking    Comorbidities: asthmausers daily inhaler/medication , anxiety, back pain, depressed moodtakes medications, DVT/MICHAEL, esophageal reflux, knee paindoes not take NSAIDs, Insulin dependent diabetes, sleep apnea using an appliance, and hypertension controlled with oral meds  Patient Active Problem List   Diagnosis    Volume overload    Shortness of breath    Abnormal uterine bleeding    Anemia in stage 5 chronic kidney disease, not on chronic dialysis (Multi)    Obstructive sleep apnea (adult) (pediatric)    Cardiomyopathy (Multi)    End stage renal disease (Multi)    Diabetic nephropathy associated with type 2 diabetes mellitus (Multi)    Disorder of mineral metabolism, unspecified    Elevated blood-pressure reading without diagnosis of hypertension    GERD (gastroesophageal reflux disease)    High cholesterol    Hearing loss    Hyperparathyroidism (Multi)    Essential hypertension    Obesity due to excess calories    Pulmonary embolism (Multi)    Secondary hyperparathyroidism of renal origin (Multi)  "   Steal syndrome as complication of dialysis access (CMS-HCC)    Type 1 diabetes mellitus (Multi)    Vitamin D deficiency       Antoinette-en-Y Gastric Bypass    2 = Symptoms noticeable and bothersome but not every day, 1-3 x week or less. Uses TUMS prn. If the tums do not help tries \"home remedies\" of apple cider vinegar, and spoonful of mustard,.     PMH:   Past Medical History:   Diagnosis Date    Asthma ([facility])     Diabetes mellitus (Multi)     Hyperlipemia     Hypertension         PSH: No past surgical history on file.       + personal/family hx of VTE.  Hx PE in Sept/Oct 2023 after perma cath placement for Dialysis. Patient states she is currently on Elliquis, and was  advised she can stop taking it next month.     FAMILY HISTORY:  No family history on file.     SOCIAL HISTORY:  Social History     Tobacco Use    Smoking status: Former     Types: Cigarettes    Smokeless tobacco: Never   Vaping Use    Vaping status: Never Used   Substance Use Topics    Alcohol use: Never    Drug use: Never       MEDICATIONS:  Prior to Admission Medications:  Medication Documentation Review Audit       Reviewed by Nils Price MA (Medical Assistant) on 24 at 1112      Medication Order Taking? Sig Documenting Provider Last Dose Status   acetaminophen (Tylenol) 325 mg tablet [id number]  Take 2 tablets (650 mg) by mouth every 6 hours if needed for mild pain (1 - 3) for up to 10 days. Em Quarles, APRN-CNP  Active   albuterol 0.63 mg/3 mL nebulizer solution [id number] No USE ONE VIAL IN NEBULIZER EVERY 6 HOURS AS NEEDED Historical Provider, MD Taking Active   Alcohol Prep Pads pads, medicated [id number] No USE 3 PADS 3 TIMES A DAY Historical Provider, MD Taking Active     Discontinued 10/05/23 1552   apixaban (Eliquis) 5 mg tablet [id number]  Take 1 tablet (5 mg) by mouth 2 times a day. Mp Shields MD   24 2359   atorvastatin (Lipitor) 10 mg tablet [id number] No  Historical Provider, MD Taking Active " "  atorvastatin (Lipitor) 20 mg tablet [id number] No Take 1 tablet (20 mg) by mouth once daily. as directed Historical Provider, MD Taking Active   calcitriol (Rocaltrol) 0.5 mcg capsule [id number] No Take 1 capsule (0.5 mcg) by mouth once daily. Historical Provider, MD Taking Active   cyclobenzaprine (Flexeril) 10 mg tablet [id number] No Take 1 tablet (10 mg) by mouth once daily. Historical Provider, MD Taking Active   ergocalciferol (Vitamin D-2) 1.25 MG (81583 UT) capsule [id number] No Take 1 capsule (1,250 mcg) by mouth 1 (one) time per week. Historical Provider, MD Taking Active   ferrous sulfate 325 (65 Fe) MG tablet [id number] No Take 1 tablet by mouth every other day. Historical Provider, MD Taking Active   Flovent HFA 44 mcg/actuation inhaler [id number] No Inhale 2 puffs 2 times a day. Historical Provider, MD Taking Active   Lantus Solostar U-100 Insulin 100 unit/mL (3 mL) pen [id number] No INJECT 42 UNITS SUBCUTANEOUSLY TWICE A DAY Historical Provider, MD Taking Active   metoprolol succinate XL (Toprol-XL) 50 mg 24 hr tablet [id number] No Take 1 tablet (50 mg) by mouth once daily. Do not crush or chew. Historical Provider, MD Taking Active   montelukast (Singulair) 10 mg tablet [id number] No Take 1 tablet (10 mg) by mouth once daily. Historical MD Andre Taking Active   NovoLOG FlexPen U-100 Insulin 100 unit/mL (3 mL) pen [id number] No INJECT 5 UNITS SUBCUTANEOUSLY WITH BREAKFAST, 10 UNITS WITH LUNCH, AND 20 UNITS WITH DINNER Historical Provider, MD Taking Active   sevelamer carbonate (Renvela) 800 mg tablet [id number] No Take 1 tablet (800 mg) by mouth 3 times a day with meals. Swallow tablet whole; do not crush, break, or chew. Historical Provider, MD Taking Active   UltiCare Pen Needle 31 gauge x 1/4\" needle [id number] No TEST 5 TIMES A DAY Historical MD Andre Taking Active   Ventolin HFA 90 mcg/actuation inhaler [id number] No Inhale 2 puffs 4 times a day as needed. Historical Provider, MD Taking Active      " "               ALLERGIES:  Allergies   Allergen Reactions    Gabapentin Angioedema, Anaphylaxis, Hives, Itching, Nausea And Vomiting, Rash, Swelling, Unknown and Wheezing     Itchy and swollen throat    Mushroom Hives, Itching, Swelling and Wheezing    Strawberry Hives, Itching and Swelling    Oxycodone-Acetaminophen Angioedema and Rash       REVIEW OF SYSTEMS:  GENERAL: Negative for malaise, significant weight loss and fever  HEAD: Negative for headache, swelling.  NECK: Negative for lumps, goiter, pain and significant neck swelling  RESPIRATORY: Negative for cough, wheezing or shortness of breath.  CARDIOVASCULAR: Negative for chest pain, leg swelling or palpitations.  GI: Negative for abdominal discomfort, blood in stools or black stools or change in bowel habits  : No history of dysuria, frequency or incontinence  MUSCULOSKELETAL: Negative for joint pain or swelling, back pain or muscle pain.  SKIN: Negative for lesions, rash, and itching.  PSYCH: Negative for sleep disturbance, mood disorder and recent psychosocial stressors.  ENDOCRINE: Negative for cold or heat intolerance, polyuria, polydipsia and goiter.    Objective   PHYSICAL EXAM:  Visit Vitals  BP (!) 157/91 (BP Location: Right arm, Patient Position: Sitting, BP Cuff Size: Large adult long)   Pulse 94   Ht 1.702 m (5' 7\")   Wt 127 kg (279 lb 9.6 oz)   SpO2 97%   BMI 43.79 kg/m²   OB Status Having periods   Smoking Status Former   BSA 2.45 m²     General appearance: NAD  Neuro: AOx3  Head: EOMI; no swelling or lesions of scalp or face  ENT:  no lumps or lymphadenopathy, thyroid normal to palpation; oropharynx clear, no swelling or erythema  Skin: warm, no erythema or rashes  Lungs: clear to percussion and auscultation  Heart: regular rhythm and S1, S2 normal  Abdomen: soft, non-tender, no masses, no organomegaly  Extremities: Normal exam of the extremities. No swelling or pain.  Psych: no hurried speech, no flight of ideas, normal " affect    Assessment/Plan   IMPRESSION:  Alexus Haley is a 38 y.o. female with a BMI of Body mass index is 43.79 kg/m². with the following diagnoses and co-morbidities:     Past Medical History:   Diagnosis Date    Asthma ([facility])     Diabetes mellitus (Multi)     Hyperlipemia     Hypertension        This patient does meet the criteria for a surgical weight loss procedure according to NIH guidelines.    Patient was in [facility] and was being considered for bariatric surgery.  After moving to [place] she is looking into doing weight loss surgery again.  She is end-stage renal disease patient with dialysis through [facility] on Monday Wednesday Friday.  She is considering kidney transplant.    We discussed about gastric bypass and sleeve gastrectomy.    She also has a history of pulmonary embolism for which she is on Eliquis which is supposed to be stopped in June.    I explained to the patient that in hemodialysis patients who will be getting kidney transplant my preference is sleeve gastrectomy versus gastric bypass to avoid any medication malabsorption issues potentially threatening the viability of the transplant in future also risk of ulcer formation etc. from steroids which periodically may be used in transplant patients and high risk of bleeding from anticoagulation if she needs to stay on anticoagulation for longer periods.        The risks of sleeve gastrectomy, Antoinette-en-Y gastric bypass surgery including but not limited to bleeding, leak along staple lines, infection, dehydration, ulcers, internal hernia, DVT/PE, pneumonia, myocardial infarction, prolonged nausea/vomiting, incomplete resolution of associated medical conditions, reflux, weight regain, vitamin/mineral deficiencies, and death have been explained to the patient and Alexus Haley has expressed understanding and acceptance of them.     We discussed the lifestyle changes necessary to be successful following surgery.    After discussing  these she agrees to proceed with sleeve gastrectomy.    I explained to her that being end-stage renal disease patient is on dialysis puts her at a high risk of postoperative cardiopulmonary complications.  She voiced understanding and wants to proceed with the surgery.    The benefits of the above surgeries including weight loss, possible improvement/resolution of associated medical conditions, improved mobility, and decreased mortality have been explained the the patient and Alexus Haley has expressed understanding and acceptance of them.      PLAN:  The plan of treatment for Alexus Haley is to continue with the consultations and tests ordered today in hopes of qualifying for pre-operative clearance for bariatric surgery. This includes:    Consult Nutrition for education   Consult Psychology  Consult Cardiology  Labs ordered  PCP for medical optimization  Consult sleep medicine - concern for LUIS FELIPE

## 2024-05-13 NOTE — TELEPHONE ENCOUNTER
Left message for patient to confirm dialysis status as all care is seemingly garnered through the [facility].  If patient is on dialysis she will need to have surgery through our [facility] location.  Message left for patient.

## 2024-05-14 ENCOUNTER — OFFICE VISIT (OUTPATIENT)
Dept: OBSTETRICS AND GYNECOLOGY | Facility: CLINIC | Age: 39
End: 2024-05-14
Payer: COMMERCIAL

## 2024-05-14 VITALS
BODY MASS INDEX: 44.21 KG/M2 | SYSTOLIC BLOOD PRESSURE: 106 MMHG | DIASTOLIC BLOOD PRESSURE: 62 MMHG | WEIGHT: 281.7 LBS | HEIGHT: 67 IN

## 2024-05-14 DIAGNOSIS — Z01.419 ENCNTR FOR GYN EXAM (GENERAL) (ROUTINE) W/O ABN FINDINGS: Primary | ICD-10-CM

## 2024-05-14 DIAGNOSIS — Z31.69 ENCOUNTER FOR PRECONCEPTION CONSULTATION: ICD-10-CM

## 2024-05-14 DIAGNOSIS — Z12.4 SCREENING FOR CERVICAL CANCER: ICD-10-CM

## 2024-05-14 DIAGNOSIS — Z30.432 ENCOUNTER FOR IUD REMOVAL: ICD-10-CM

## 2024-05-14 PROCEDURE — 87624 HPV HI-RISK TYP POOLED RSLT: CPT

## 2024-05-14 PROCEDURE — 58301 REMOVE INTRAUTERINE DEVICE: CPT | Performed by: ADVANCED PRACTICE MIDWIFE

## 2024-05-14 PROCEDURE — 99385 PREV VISIT NEW AGE 18-39: CPT | Performed by: ADVANCED PRACTICE MIDWIFE

## 2024-05-14 PROCEDURE — 3078F DIAST BP <80 MM HG: CPT | Performed by: ADVANCED PRACTICE MIDWIFE

## 2024-05-14 PROCEDURE — 88142 CYTOPATH C/V THIN LAYER: CPT

## 2024-05-14 PROCEDURE — 3074F SYST BP LT 130 MM HG: CPT | Performed by: ADVANCED PRACTICE MIDWIFE

## 2024-05-14 NOTE — PROGRESS NOTES
Surgeon: Carol  Patient is considering:  Gastric Bypass    ASSESSMENT:  Current weight:  279 lbs   Ht: 67  in   BMI: 43.79       Initial start weight: 279 lbs  Pre-Op Excess Body Weight (EBW):   120 lbs  Target Post-Op weight goal:  183 - 207 lbs    Food allergies/intolerances:  mushroom and strawberries  Chewing/Swallowing/Dentition: denies    Nausea / Vomiting / Hx Gastroparesis:  denies  Diarrhea/ Constipation: denies  Exercise level:  active t/o the day  Smoking/Tobacco use:  denies  Vitamins/Minerals supplements:  renal vitamin, phosphorus binder with meals   Medications:   see list  Past diet attempts:   keto, low calorie, low carb  Hours of sleep/night: 6    24 HOUR RECALL/DIET HISTORY: HD: M/W/F - 6AM - 11AM   Breakfast:  nothing  Snack:    Lunch: nothing  Snack:   Dinner: cheese sandwich  Snack: laffy taffy  Beverages: 64oz water  Alcohol: special occasions    Person responsible for cooking & shopping? Self   Grocery stores frequented: [employer] trips per week/month: Every month  Food Insecurity: Low  How often do you eat sweet snacks?  Every other day  How often do you eat savory snacks?  Not often  How often do you eat out?  2 times/mo- fast food  Do you feel overly stuffed?  denies  Binge Eating? denies  Night Eating?  denies  Emotional Eating?  denies       READINESS TO LEARN:  Motivation to learn: Interested        Understanding of instruction: Good   Anticipated Compliance: Good      Family Support: Unable to assess-family not present          Educational Materials Provided:    Plate Method  High Protein Snack List  High Protein Drink  High Protein food list  Schedules for support group   Goals sheet    Patient will scheduled to attend a Virtual Education Class to review the 2 week Pre-op diet, Post op fluid, protein, vitamin/mineral supplementation, exercise goals and post op diet progression.    Patient presents with excessive calorie obesity seeking  Gastric Bypass.    Patient seen today  to complete nutrition evaluation for weight loss surgery. Patient has made multiple attempts to lose weight. Finds she struggles with eating regular meals, typically may eat 1 meal/day. Patient has ESRD and gets HD tx 3 days/week. Patient does receive Boost and a protein bar during and after treatment. Patient reports she drinks water only. Does not exercise, but is an [employer]  and feels she is walking through most of her days.     Recommended to begin to eat 3 meals/day, avoid skipping meals. Meal plan was written for patient in office during session to follow on dialysis days and non dialysis days. Reviewed balanced meals by using plate method. Encouraged to eat protein rich foods at each meal, balanced with fiber rich foods. Reviewed fluids to eliminate and replace with SF, non-carbonated, caffeine free fluids. Reviewed postop behaviors and encouraged to begin practicing. Recommended to begin exercising  for 3 days/week for 20-30 minutes.     Patient was receptive to nutritional recommendations, asked numerous questions, and verbalized understanding of the weight loss surgery diet.  Patient expressed understanding about the importance of strict dietary compliance post-surgery to avoid nutritional deficiencies and achieve optimal weight loss and verbalized intent to follow dietary recommendations.      Malnutrition Screening:  Significant unintentional weight loss? No  Eating less than 75% of usual intake for more than 2 weeks? No      Nutrition Diagnosis:   1. Overweight/obesity related to excess energy intake as evidenced by BMI = 40 kg/m^2.  2. Food- and nutrition-related knowledge deficit related to lack of prior exposure to surgical weight loss information as evidenced by pt new to surgical program.    Nutrition Interventions:   1. Modify type and amount of food and nutrients within meals and snacks.  2. Comprehensive Nutrition Education    Recommendations:  1. Structure meal patterns, eating three meals  and 1-2 snacks per day.   2. Build meals around protein rich foods. Aim for 3-4 ounces (20-30 grams) protein per meal. Lean proteins include chicken, turkey, fish, lean cuts of beef and 90% lean ground beef, pork, shrimp, low fat dairy products, and eggs.   3. Fill half your plate with non-starchy vegetables. Select high fiber starches like  sweet potatoes, peas, beans, lentils, quinoa, whole wheat breads and pastas, and brown rice. Keep portion of starches to 1/4 plate (1/2 cup - 1 cup per meal).  4. Drink 64oz of calorie-free, caffeine-free, and non-carbonated beverages.   5. Practice no drinking 30 minutes before meals, nothing with meals and wait 30 minutes after meals to drink again. Make meals last 30 minutes-chew thoroughly.   6. Limit or omit eating out/sweets/savory snacks to 1-2 times per week.  7. Begin exercising 3 days/week for 30 minutes. Track your steps on the days you work.  Consistency is the key.  8. We will follow up with your progress on 6/27/24 at 11:30 AM. You will need to weigh yourself before this appointment and provide a 24 hour food recall.    Pre-op Goal weight: lose 5% of body weight    Nutrition Monitoring and Evaluation: 1-2 pound weight loss per week  Criteria: weight check  Need for Follow-up:     Patient does meet National Institutes Health guidelines for weight loss surgery, however needs to demonstrate consistent effort in making dietary changes before giving clearance. It is anticipated that the patient will need at least  1-2   nutritional follow-up visits prior to clearance for surgery.

## 2024-05-15 ENCOUNTER — TELEPHONE (OUTPATIENT)
Dept: SURGERY | Facility: CLINIC | Age: 39
End: 2024-05-15
Payer: COMMERCIAL

## 2024-05-15 NOTE — TELEPHONE ENCOUNTER
Spoke with patient to update clinical note for tomorrows appointment. Also asked patient to please review messages in [employer] and submit the electronic nutrition questionnaire  Jennifer sent for the dietician intake. If unable to to complete via [facility] please arrive early to complete the nutrition intake form at the office.

## 2024-05-16 ENCOUNTER — OFFICE VISIT (OUTPATIENT)
Dept: SURGERY | Facility: CLINIC | Age: 39
End: 2024-05-16
Payer: COMMERCIAL

## 2024-05-16 ENCOUNTER — NUTRITION (OUTPATIENT)
Dept: SURGERY | Facility: CLINIC | Age: 39
End: 2024-05-16
Payer: COMMERCIAL

## 2024-05-16 VITALS
WEIGHT: 279.6 LBS | BODY MASS INDEX: 43.88 KG/M2 | DIASTOLIC BLOOD PRESSURE: 91 MMHG | HEIGHT: 67 IN | SYSTOLIC BLOOD PRESSURE: 157 MMHG | HEART RATE: 94 BPM | OXYGEN SATURATION: 97 %

## 2024-05-16 DIAGNOSIS — E10.22 TYPE 1 DIABETES MELLITUS WITH CHRONIC KIDNEY DISEASE ON CHRONIC DIALYSIS (MULTI): ICD-10-CM

## 2024-05-16 DIAGNOSIS — E55.9 VITAMIN D DEFICIENCY: ICD-10-CM

## 2024-05-16 DIAGNOSIS — I10 ESSENTIAL HYPERTENSION: ICD-10-CM

## 2024-05-16 DIAGNOSIS — R03.0 ELEVATED BLOOD-PRESSURE READING WITHOUT DIAGNOSIS OF HYPERTENSION: ICD-10-CM

## 2024-05-16 DIAGNOSIS — Z86.2 HISTORY OF ANEMIA DUE TO CKD: ICD-10-CM

## 2024-05-16 DIAGNOSIS — E66.01 CLASS 3 SEVERE OBESITY WITH SERIOUS COMORBIDITY AND BODY MASS INDEX (BMI) OF 40.0 TO 44.9 IN ADULT, UNSPECIFIED OBESITY TYPE (MULTI): ICD-10-CM

## 2024-05-16 DIAGNOSIS — N18.9 HISTORY OF ANEMIA DUE TO CKD: ICD-10-CM

## 2024-05-16 DIAGNOSIS — N18.6 TYPE 1 DIABETES MELLITUS WITH CHRONIC KIDNEY DISEASE ON CHRONIC DIALYSIS (MULTI): ICD-10-CM

## 2024-05-16 DIAGNOSIS — M25.50 ARTHRALGIA, UNSPECIFIED JOINT: ICD-10-CM

## 2024-05-16 DIAGNOSIS — D63.1 ANEMIA IN STAGE 5 CHRONIC KIDNEY DISEASE, NOT ON CHRONIC DIALYSIS (MULTI): ICD-10-CM

## 2024-05-16 DIAGNOSIS — E78.00 HIGH CHOLESTEROL: ICD-10-CM

## 2024-05-16 DIAGNOSIS — N25.81 SECONDARY HYPERPARATHYROIDISM OF RENAL ORIGIN (MULTI): ICD-10-CM

## 2024-05-16 DIAGNOSIS — E11.65 TYPE 2 DIABETES MELLITUS WITH HYPERGLYCEMIA, UNSPECIFIED WHETHER LONG TERM INSULIN USE (MULTI): ICD-10-CM

## 2024-05-16 DIAGNOSIS — E66.01 MORBID (SEVERE) OBESITY DUE TO EXCESS CALORIES (MULTI): ICD-10-CM

## 2024-05-16 DIAGNOSIS — Z71.3 ENCOUNTER FOR NUTRITION EVALUATION PRIOR TO BARIATRIC SURGERY: Primary | ICD-10-CM

## 2024-05-16 DIAGNOSIS — N18.5 ANEMIA IN STAGE 5 CHRONIC KIDNEY DISEASE, NOT ON CHRONIC DIALYSIS (MULTI): ICD-10-CM

## 2024-05-16 DIAGNOSIS — E66.01 CLASS 3 SEVERE OBESITY DUE TO EXCESS CALORIES WITH SERIOUS COMORBIDITY AND BODY MASS INDEX (BMI) OF 45.0 TO 49.9 IN ADULT (MULTI): Primary | ICD-10-CM

## 2024-05-16 DIAGNOSIS — K21.9 GASTROESOPHAGEAL REFLUX DISEASE, UNSPECIFIED WHETHER ESOPHAGITIS PRESENT: ICD-10-CM

## 2024-05-16 DIAGNOSIS — Z71.3 PRE-BARIATRIC SURGERY NUTRITION EVALUATION: ICD-10-CM

## 2024-05-16 DIAGNOSIS — Z98.84 BARIATRIC SURGERY STATUS: ICD-10-CM

## 2024-05-16 DIAGNOSIS — Z99.2 TYPE 1 DIABETES MELLITUS WITH CHRONIC KIDNEY DISEASE ON CHRONIC DIALYSIS (MULTI): ICD-10-CM

## 2024-05-16 DIAGNOSIS — I42.9 CARDIOMYOPATHY, UNSPECIFIED TYPE (MULTI): ICD-10-CM

## 2024-05-16 PROCEDURE — 99205 OFFICE O/P NEW HI 60 MIN: CPT | Performed by: SURGERY

## 2024-05-16 PROCEDURE — 99215 OFFICE O/P EST HI 40 MIN: CPT | Performed by: SURGERY

## 2024-05-16 PROCEDURE — 1036F TOBACCO NON-USER: CPT | Performed by: SURGERY

## 2024-05-16 PROCEDURE — 3080F DIAST BP >= 90 MM HG: CPT | Performed by: SURGERY

## 2024-05-16 PROCEDURE — 3077F SYST BP >= 140 MM HG: CPT | Performed by: SURGERY

## 2024-05-16 PROCEDURE — 3008F BODY MASS INDEX DOCD: CPT | Performed by: SURGERY

## 2024-05-21 ENCOUNTER — APPOINTMENT (OUTPATIENT)
Dept: CARDIOLOGY | Facility: CLINIC | Age: 39
End: 2024-05-21
Payer: COMMERCIAL

## 2024-05-23 LAB
CYTOLOGY CMNT CVX/VAG CYTO-IMP: NORMAL
HPV HR 12 DNA GENITAL QL NAA+PROBE: NEGATIVE
HPV HR GENOTYPES PNL CVX NAA+PROBE: NEGATIVE
HPV16 DNA SPEC QL NAA+PROBE: NEGATIVE
HPV18 DNA SPEC QL NAA+PROBE: NEGATIVE
LAB AP HPV GENOTYPE QUESTION: YES
LAB AP HPV HR: NORMAL
LABORATORY COMMENT REPORT: NORMAL
LABORATORY COMMENT REPORT: NORMAL
PATH REPORT.TOTAL CANCER: NORMAL

## 2024-05-28 ENCOUNTER — APPOINTMENT (OUTPATIENT)
Dept: OBSTETRICS AND GYNECOLOGY | Facility: CLINIC | Age: 39
End: 2024-05-28
Payer: COMMERCIAL

## 2024-05-29 ENCOUNTER — OFFICE VISIT (OUTPATIENT)
Dept: OBSTETRICS AND GYNECOLOGY | Facility: CLINIC | Age: 39
End: 2024-05-29
Payer: COMMERCIAL

## 2024-05-29 ENCOUNTER — PREP FOR PROCEDURE (OUTPATIENT)
Dept: OBSTETRICS AND GYNECOLOGY | Facility: CLINIC | Age: 39
End: 2024-05-29

## 2024-05-29 ENCOUNTER — HOSPITAL ENCOUNTER (OUTPATIENT)
Facility: HOSPITAL | Age: 39
Setting detail: OUTPATIENT SURGERY
End: 2024-05-29
Attending: OBSTETRICS & GYNECOLOGY | Admitting: OBSTETRICS & GYNECOLOGY
Payer: COMMERCIAL

## 2024-05-29 VITALS — DIASTOLIC BLOOD PRESSURE: 78 MMHG | WEIGHT: 275 LBS | BODY MASS INDEX: 43.07 KG/M2 | SYSTOLIC BLOOD PRESSURE: 130 MMHG

## 2024-05-29 DIAGNOSIS — Z98.84 BARIATRIC SURGERY STATUS: ICD-10-CM

## 2024-05-29 DIAGNOSIS — R30.0 SPASTIC DYSURIA: ICD-10-CM

## 2024-05-29 DIAGNOSIS — N90.60 LABIAL HYPERTROPHY: Primary | ICD-10-CM

## 2024-05-29 PROCEDURE — 3008F BODY MASS INDEX DOCD: CPT | Performed by: OBSTETRICS & GYNECOLOGY

## 2024-05-29 PROCEDURE — 99204 OFFICE O/P NEW MOD 45 MIN: CPT | Performed by: OBSTETRICS & GYNECOLOGY

## 2024-05-29 PROCEDURE — 3075F SYST BP GE 130 - 139MM HG: CPT | Performed by: OBSTETRICS & GYNECOLOGY

## 2024-05-29 PROCEDURE — 3078F DIAST BP <80 MM HG: CPT | Performed by: OBSTETRICS & GYNECOLOGY

## 2024-05-29 RX ORDER — ACETAMINOPHEN 325 MG/1
975 TABLET ORAL ONCE
OUTPATIENT
Start: 2024-05-29 | End: 2024-05-29

## 2024-05-29 RX ORDER — TRANEXAMIC ACID 650 MG/1
1300 TABLET ORAL ONCE
OUTPATIENT
Start: 2024-05-29 | End: 2024-05-29

## 2024-05-29 RX ORDER — SODIUM CHLORIDE, SODIUM LACTATE, POTASSIUM CHLORIDE, CALCIUM CHLORIDE 600; 310; 30; 20 MG/100ML; MG/100ML; MG/100ML; MG/100ML
100 INJECTION, SOLUTION INTRAVENOUS CONTINUOUS
OUTPATIENT
Start: 2024-05-29

## 2024-05-29 RX ORDER — CELECOXIB 400 MG/1
400 CAPSULE ORAL ONCE
OUTPATIENT
Start: 2024-05-29 | End: 2024-05-29

## 2024-05-29 RX ORDER — CEFAZOLIN SODIUM 2 G/100ML
2 INJECTION, SOLUTION INTRAVENOUS ONCE
OUTPATIENT
Start: 2024-05-29 | End: 2024-05-29

## 2024-06-03 NOTE — PROGRESS NOTES
GYN PROGRESS NOTE          Chief complaint: Consult for labial hypertrophy    HPI:  Patient answers are not available for this visit.  HPI       surgery consult     Additional comments: New pt   Chaperone student             Comments    Pt has extra vulvar skin, states it bothers her  Would like to get it removed  Had consult years ago to get it removed           Last edited by Deisi Christian MA on 5/29/2024 11:14 AM.          Reviewed case with patient, reviewed plans.    Patient has elongated 1 side of her labia this requires her to talk when seated be careful of type of underwear it can hurt with activities such as sex causing motion    ROS:  GEN - no fevers or chills  RESP - no SOB or cough  GYN - see HPI      HISTORY:  Past Medical History:   Diagnosis Date    Asthma ([facility])     Diabetes mellitus (Multi)     Hyperlipemia     Hypertension      No past surgical history on file.  Social History     Socioeconomic History    Marital status: Single     Spouse name: Not on file    Number of children: Not on file    Years of education: Not on file    Highest education level: Not on file   Occupational History    Not on file   Tobacco Use    Smoking status: Former     Current packs/day: 0.00     Types: Cigarettes    Smokeless tobacco: Never   Vaping Use    Vaping status: Never Used   Substance and Sexual Activity    Alcohol use: Never    Drug use: Never    Sexual activity: Not Currently     Partners: Female     Birth control/protection: I.U.D.   Other Topics Concern    Not on file   Social History Narrative    Not on file     Social Determinants of Health     Financial Resource Strain: Low Risk  (12/29/2023)    Overall Financial Resource Strain (CARDIA)     Difficulty of Paying Living Expenses: Not hard at all   Food Insecurity: Food Insecurity Present (10/20/2023)    Hunger Vital Sign     Worried About Running Out of Food in the Last Year: Sometimes true     Ran Out of Food in the Last Year: Often true   Transportation  Needs: No Transportation Needs (12/29/2023)    PRAPARE - Transportation     Lack of Transportation (Medical): No     Lack of Transportation (Non-Medical): No   Physical Activity: Not on file   Stress: Not on file   Social Connections: Not on file   Intimate Partner Violence: Not on file   Housing Stability: High Risk (12/29/2023)    Housing Stability Vital Sign     Unable to Pay for Housing in the Last Year: No     Number of Places Lived in the Last Year: 2     Unstable Housing in the Last Year: Yes     Cancer-related family history is not on file.       PHYSICAL EXAM:  /78   Wt 125 kg (275 lb)   BMI 43.07 kg/m²   Physical examination:  General: No distress  Neck: No masses  Respiratory: No respiratory distress  Abdomen: soft nontender no hernias  GYN: Normal vulvar skin with right-sided labia majora skin projection to 6 cm normal clitoral oscar and clitoris labia majora and minora normal vaginal mucosa no lesions   perianal area: without lesions        IMPRESSION/PLAN:  38-year-old right-sided labia majora elongation, with multiple symptoms    Plan right-sided labia minora reduction  Hemoglobin A1c needs to be under 8 to operate          Altaf Zambrano MD

## 2024-06-11 ENCOUNTER — OFFICE VISIT (OUTPATIENT)
Dept: CARDIOLOGY | Facility: CLINIC | Age: 39
End: 2024-06-11
Payer: COMMERCIAL

## 2024-06-11 ENCOUNTER — TELEPHONE (OUTPATIENT)
Dept: CARDIOLOGY | Facility: CLINIC | Age: 39
End: 2024-06-11

## 2024-06-11 VITALS
SYSTOLIC BLOOD PRESSURE: 128 MMHG | WEIGHT: 280.8 LBS | DIASTOLIC BLOOD PRESSURE: 78 MMHG | BODY MASS INDEX: 44.07 KG/M2 | HEIGHT: 67 IN | HEART RATE: 88 BPM

## 2024-06-11 DIAGNOSIS — Z87.891 FORMER SMOKER: ICD-10-CM

## 2024-06-11 DIAGNOSIS — Z76.89 ENCOUNTER TO ESTABLISH CARE: ICD-10-CM

## 2024-06-11 DIAGNOSIS — I26.99 PULMONARY EMBOLISM, UNSPECIFIED CHRONICITY, UNSPECIFIED PULMONARY EMBOLISM TYPE, UNSPECIFIED WHETHER ACUTE COR PULMONALE PRESENT (MULTI): ICD-10-CM

## 2024-06-11 DIAGNOSIS — Z01.818 PRE-OP EXAMINATION: ICD-10-CM

## 2024-06-11 DIAGNOSIS — E78.00 HIGH CHOLESTEROL: ICD-10-CM

## 2024-06-11 DIAGNOSIS — R94.31 ABNORMAL ELECTROCARDIOGRAM (ECG) (EKG): ICD-10-CM

## 2024-06-11 DIAGNOSIS — I10 ESSENTIAL HYPERTENSION: ICD-10-CM

## 2024-06-11 DIAGNOSIS — E11.21 DIABETIC NEPHROPATHY ASSOCIATED WITH TYPE 2 DIABETES MELLITUS (MULTI): ICD-10-CM

## 2024-06-11 DIAGNOSIS — Z01.818 PRE-OPERATIVE CLEARANCE: ICD-10-CM

## 2024-06-11 PROCEDURE — 3074F SYST BP LT 130 MM HG: CPT | Performed by: INTERNAL MEDICINE

## 2024-06-11 PROCEDURE — 3078F DIAST BP <80 MM HG: CPT | Performed by: INTERNAL MEDICINE

## 2024-06-11 PROCEDURE — 3008F BODY MASS INDEX DOCD: CPT | Performed by: INTERNAL MEDICINE

## 2024-06-11 PROCEDURE — 1036F TOBACCO NON-USER: CPT | Performed by: INTERNAL MEDICINE

## 2024-06-11 PROCEDURE — 93000 ELECTROCARDIOGRAM COMPLETE: CPT | Performed by: INTERNAL MEDICINE

## 2024-06-11 PROCEDURE — 99204 OFFICE O/P NEW MOD 45 MIN: CPT | Performed by: INTERNAL MEDICINE

## 2024-06-11 NOTE — TELEPHONE ENCOUNTER
----- Message from Ruth Preston LPN sent at 6/11/2024  2:42 PM EDT -----  Patient being scheduled for echo and MPL prior to clearing for Bariatric surgery

## 2024-06-11 NOTE — PROGRESS NOTES
Referred by Dr. Jade ref. provider found provider found for   Chief Complaint   Patient presents with    New Patient Visit    Pre-op Clearance     Bariatric Surgery clearance        History of Present Illness  Alexus Haley is a 38 y.o. year old female patient is here for cardiac evaluation for preop clearance for bariatric surgery.  She is a patient with history of end-stage renal failure on hemodialysis.  She had history of hypertension and longstanding history of diabetes.  She had history of hyperlipidemia taking cholesterol medication.  She does have occasional episode of shortness of breath.  Her blood pressure is adequately controlled..  She is very limited her activity.  I discussed with the patient length although she is younger patient but with so many comorbidities including hypertension including renal failure and renal and diabetes we will go ahead with Lexiscan stress test and echo for preop risk stratification.  Based on that further recommendation will be made    Past Medical History  Past Medical History:   Diagnosis Date    Asthma ([facility])     Chronic kidney disease     Diabetes mellitus (Multi)     Hyperlipemia     Hypertension        Social History  Social History     Tobacco Use    Smoking status: Former     Current packs/day: 0.00     Types: Cigarettes    Smokeless tobacco: Never   Vaping Use    Vaping status: Never Used   Substance Use Topics    Alcohol use: Yes     Comment: rarely    Drug use: Never       Family History   No family history on file.    Review of Systems  As per HPI, all other systems reviewed and negative.    Allergies:  Allergies   Allergen Reactions    Gabapentin Angioedema, Anaphylaxis, Hives, Itching, Nausea And Vomiting, Rash, Swelling, Unknown and Wheezing     Itchy and swollen throat    Mushroom Hives, Itching, Swelling and Wheezing    Strawberry Hives, Itching and Swelling    Latex      Added based on information entered during case entry, please review and add reactions,  "type, and severity as needed    Oxycodone-Acetaminophen Angioedema and Rash        Outpatient Medications:  Current Outpatient Medications   Medication Instructions    albuterol 0.63 mg/3 mL nebulizer solution USE ONE VIAL IN NEBULIZER EVERY 6 HOURS AS NEEDED    Alcohol Prep Pads pads, medicated USE 3 PADS 3 TIMES A DAY    apixaban (ELIQUIS) 5 mg, oral, 2 times daily    atorvastatin (LIPITOR) 20 mg, oral, Daily, as directed    cyclobenzaprine (FLEXERIL) 10 mg, oral, Daily    ergocalciferol (Vitamin D-2) 1.25 MG (40470 UT) capsule 1 capsule, oral, Once Weekly    ferrous sulfate 325 (65 Fe) MG tablet 1 tablet, oral, Every other day    Flovent HFA 44 mcg/actuation inhaler 2 puffs, inhalation, 2 times daily    Lantus Solostar U-100 Insulin 100 unit/mL (3 mL) pen INJECT 42 UNITS SUBCUTANEOUSLY TWICE A DAY    metoprolol succinate XL (TOPROL-XL) 50 mg, oral, Daily, Do not crush or chew.    montelukast (SINGULAIR) 10 mg, oral, Daily    NovoLOG FlexPen U-100 Insulin 100 unit/mL (3 mL) pen  INJECT 5 UNITS SUBCUTANEOUSLY WITH BREAKFAST, 10 UNITS WITH LUNCH, AND 20 UNITS WITH DINNER<BR>    sevelamer carbonate (RENVELA) 800 mg, oral, 3 times daily (morning, midday, late afternoon), Swallow tablet whole; do not crush, break, or chew.    UltiCare Pen Needle 31 gauge x 1/4\" needle TEST 5 TIMES A DAY    Ventolin HFA 90 mcg/actuation inhaler 2 puffs, inhalation, 4 times daily PRN         Vitals:  Vitals:    06/11/24 1359   BP: 128/78   Pulse: 88       Physical Exam:  Physical Exam  Vitals and nursing note reviewed.   Constitutional:       Appearance: Normal appearance.   HENT:      Head: Normocephalic.   Eyes:      Pupils: Pupils are equal, round, and reactive to light.   Cardiovascular:      Rate and Rhythm: Normal rate and regular rhythm.      Pulses: Normal pulses.      Heart sounds: Normal heart sounds.   Pulmonary:      Effort: Pulmonary effort is normal.      Breath sounds: Normal breath sounds.   Musculoskeletal:         " General: Normal range of motion.      Cervical back: Normal range of motion.   Skin:     General: Skin is dry.   Neurological:      General: No focal deficit present.      Mental Status: She is alert and oriented to person, place, and time.   Psychiatric:         Behavior: Behavior is cooperative.             Assessment/Plan   Diagnoses and all orders for this visit:  Encounter to establish care  Pre-op examination  Pre-operative clearance  Essential hypertension  High cholesterol  Pulmonary embolism, unspecified chronicity, unspecified pulmonary embolism type, unspecified whether acute cor pulmonale present (Multi)  Diabetic nephropathy associated with type 2 diabetes mellitus (Multi)  BMI 40.0-44.9, adult (Multi)  Former smoker          Geena Pascual MD [facility]  Interventional Cardiology   of [facility]     Thank you for allowing me to participate in the care of this patient. Please do not hesitate to contact me with any further questions or concerns.

## 2024-06-11 NOTE — PATIENT INSTRUCTIONS
You will be scheduled for an echocardiogram and a Lexiscan stress test.  Once tests results are reviewed by Dr. Pascual, you will be cleared for surgery, as long as test are normal.  Will call patient with test results once reviewed by physician    Follow up as needed    Continue same medications/treatment.  Patient educated on proper medication use.  Patient educated on risk factor modification.  Please bring any lab results from other providers / physicians to your next appointment.    Please bring all medicines, vitamins and herbal supplements with you when you come to the office.    Prescriptions will not be filled unless you are compliant with your follow up appointments or have a follow up  appointment scheduled as per instruction of your physician.  Refills should be requested at the time of  Your visit.    IRuth LPN, am scribing for and in the presence of  Dr. Geena Pascual MD, FACC

## 2024-06-11 NOTE — TELEPHONE ENCOUNTER
This is how it is every day with Francoise GUSTAVO pollock. She has hardly any tasking with MV, so she comes in and scribes for him- then wants to go home immediately.

## 2024-06-11 NOTE — PROGRESS NOTES
Referred by Dr. Jade ref. provider found for New Patient Visit and Pre-op Clearance (Bariatric Surgery clearance)     History Of Present Illness:    Alexus Haley is a 38 y.o. female presenting with ***.      Past Medical History:  She has a past medical history of Asthma ([facility]), Chronic kidney disease, Diabetes mellitus (Multi), Hyperlipemia, and Hypertension.    Past Surgical History:  She has a past surgical history that includes Dialysis fistula creation (Left) and Tonsillectomy (Bilateral).      Social History:  She reports that she has quit smoking. Her smoking use included cigarettes. She has never used smokeless tobacco. She reports current alcohol use. She reports that she does not use drugs.    Family History:  No family history on file.     Allergies:  Gabapentin, Mushroom, Strawberry, Latex, and Oxycodone-acetaminophen    Outpatient Medications:  Current Outpatient Medications   Medication Instructions    albuterol 0.63 mg/3 mL nebulizer solution USE ONE VIAL IN NEBULIZER EVERY 6 HOURS AS NEEDED    Alcohol Prep Pads pads, medicated USE 3 PADS 3 TIMES A DAY    apixaban (ELIQUIS) 5 mg, oral, 2 times daily    atorvastatin (LIPITOR) 20 mg, oral, Daily, as directed    cyclobenzaprine (FLEXERIL) 10 mg, oral, Daily    ergocalciferol (Vitamin D-2) 1.25 MG (12225 UT) capsule 1 capsule, oral, Once Weekly    ferrous sulfate 325 (65 Fe) MG tablet 1 tablet, oral, Every other day    Flovent HFA 44 mcg/actuation inhaler 2 puffs, inhalation, 2 times daily    Lantus Solostar U-100 Insulin 100 unit/mL (3 mL) pen INJECT 42 UNITS SUBCUTANEOUSLY TWICE A DAY    metoprolol succinate XL (TOPROL-XL) 50 mg, oral, Daily, Do not crush or chew.    montelukast (SINGULAIR) 10 mg, oral, Daily    NovoLOG FlexPen U-100 Insulin 100 unit/mL (3 mL) pen  INJECT 5 UNITS SUBCUTANEOUSLY WITH BREAKFAST, 10 UNITS WITH LUNCH, AND 20 UNITS WITH DINNER<BR>    sevelamer carbonate (RENVELA) 800 mg, oral, 3 times daily (morning, midday, late afternoon),  "Swallow tablet whole; do not crush, break, or chew.    UltiCare Pen Needle 31 gauge x 1/4\" needle TEST 5 TIMES A DAY    Ventolin HFA 90 mcg/actuation inhaler 2 puffs, inhalation, 4 times daily PRN        Last Recorded Vitals:  Vitals:    06/11/24 1359   BP: 128/78   BP Location: Right arm   Patient Position: Sitting   Pulse: 88   Weight: 127 kg (280 lb 12.8 oz)   Height: 1.702 m (5' 7\")       Physical Exam:  ***       Last Labs:  CBC -  Lab Results   Component Value Date    WBC 11.8 (H) 05/10/2024    HGB 9.3 (L) 05/10/2024    HCT 30.2 (L) 05/10/2024    MCV 92 05/10/2024     05/10/2024       CMP -  Lab Results   Component Value Date    CALCIUM 8.2 (L) 05/10/2024    PHOS 4.8 12/31/2023    PROT 6.5 05/10/2024    ALBUMIN 3.5 05/10/2024    AST 9 05/10/2024    ALT 4 (L) 05/10/2024    ALKPHOS 77 05/10/2024    BILITOT 0.5 05/10/2024       LIPID PANEL -   No results found for: \"CHOL\", \"TRIG\", \"HDL\", \"CHHDL\", \"LDLF\", \"VLDL\", \"NHDL\"    RENAL FUNCTION PANEL -   Lab Results   Component Value Date    GLUCOSE 192 (H) 05/10/2024     (L) 05/10/2024    K 4.1 05/10/2024     05/10/2024    CO2 23 05/10/2024    ANIONGAP 13 05/10/2024    BUN 53 (H) 05/10/2024    CREATININE 6.34 (H) 05/10/2024    CALCIUM 8.2 (L) 05/10/2024    PHOS 4.8 12/31/2023    ALBUMIN 3.5 05/10/2024        Lab Results   Component Value Date    BNP 84 12/28/2023    HGBA1C 8.6 (H) 03/05/2024    HGBA1C 13.1 (H) 10/20/2023       Last Cardiology Tests:  ECG:  ECG 12 lead 05/10/2024    ***  Echo:  No results found for this or any previous visit from the past 1095 days.    ***  Ejection Fractions:  No results found for: \"EF\"  ***  Cath:  No results found for this or any previous visit from the past 1095 days.    ***  Stress Test:  No results found for this or any previous visit from the past 1095 days.    ***  Cardiac Imaging:  No results found for this or any previous visit from the past 1095 days.    ***    {Lab/Diag/Rad Review:43051}    Assessment/Plan "   {Assess/Plan [facility]:01745}        Marge Walker LPN

## 2024-06-14 ENCOUNTER — TELEPHONE (OUTPATIENT)
Dept: TRANSPLANT | Facility: HOSPITAL | Age: 39
End: 2024-06-14
Payer: COMMERCIAL

## 2024-06-17 NOTE — TELEPHONE ENCOUNTER
PT SCHEDULED FOR STRESS TEST INCORRECTLY- I ATTEMPTED TO CALL HER TO TRY AND RESCHEDULE- NO ANSWER LEFT VM

## 2024-06-19 NOTE — TELEPHONE ENCOUNTER
Echo and NM being scheduled.     Echo tentatively scheduled for 7/12.     Will postpone for nursing to follow up after that.

## 2024-06-20 ENCOUNTER — APPOINTMENT (OUTPATIENT)
Dept: OBSTETRICS AND GYNECOLOGY | Facility: CLINIC | Age: 39
End: 2024-06-20
Payer: COMMERCIAL

## 2024-06-25 NOTE — PROGRESS NOTES
PROGRESS:  Recommendations (5/16/24)  1.          Structure meal patterns, eating three meals and 1-2 snacks per day.   2.          Build meals around protein rich foods. Aim for 3-4 ounces (20-30 grams) protein per meal. Lean proteins include chicken, turkey, fish, lean cuts of beef and 90% lean ground beef, pork, shrimp, low fat dairy products, and eggs.   3.          Fill half your plate with non-starchy vegetables. Select high fiber starches like  sweet potatoes, peas, beans, lentils, quinoa, whole wheat breads and pastas, and brown rice. Keep portion of starches to 1/4 plate (1/2 cup - 1 cup per meal).  4.          Drink 64oz of calorie-free, caffeine-free, and non-carbonated beverages.   5.          Practice no drinking 30 minutes before meals, nothing with meals and wait 30 minutes after meals to drink again. Make meals last 30 minutes-chew thoroughly.   6.          Limit or omit eating out/sweets/savory snacks to 1-2 times per week.  7.          Begin exercising 3 days/week for 30 minutes. Track your steps on the days you work.  Consistency is the key.  8.          We will follow up with your progress on 6/27/24 at 11:30 AM. You will need to weigh yourself before this appointment and provide a 24 hour food recall.      Patient Meeting Goals: PARTIALLY         ASSESSMENT:  Current weight:   272.8 lbs    Ht: 67 in   BMI: 42.8  Previous weight: 279 lbs        Initial start weight:  279 lbs      24 HOUR RECALL/DIET HISTORY:  Breakfast:  x2hb eggs, protein shake  Snack:    Lunch: pasta salad, 3oz steak  Snack:   Dinner:  x2 slices pizza   Snack: caramel popcorn  Beverages: 64oz water  Alcohol: special occasions      Exercise: 4K steps/day   Vitamins/minerals:  renal vitamin, phosphorus binder with meals          READINESS TO LEARN:  Motivation to learn: Interested        Understanding of instruction: Good    Anticipated Compliance: Good         Family Support: Unable to assess-family not present           Educational Materials Provided:  none         Patient presents with excessive calorie obesity seeking  sleeve gastrectomy.    Pt seen today to determine nutrition clearance for weight loss surgery. Patient's weight is self reported. Patient has lost 6 lbs since IV. Pt currently admitted to hospital. Patient states she been eating 3 meals/day, does not eat breakfast on dialysis days due to timing of tx. Encouraged to have a protein drink after HD tx, and then eat lunch 2-3 hours after. Patient is drinking water, started practicing postop behaviors. Patient will work on being more consistent with practicing 30's rule. Patient is tracking steps daily, hitting 4K/day avg. Recommended to increase to 5-6K/day.   Follow up in 6 weeks for consistency and will evaluate RD clearance at this time.           Malnutrition Screening:  Significant unintentional weight loss? No  Eating less than 75% of usual intake for more than 2 weeks? No      Nutrition Diagnosis:   1. Overweight/obesity related to excess energy intake as evidenced by BMI = 40 kg/m^2.  2. Food- and nutrition-related knowledge deficit related to lack of prior exposure to surgical weight loss information as evidenced by pt new to surgical program.    Nutrition Interventions:   1. Modify type and amount of food and nutrients within meals and snacks.  2. Comprehensive Nutrition Education    Recommendations:  1. Continue to eat 3 meals/day, 1-2 snacks as needed.  2.          Build meals around protein rich foods. Aim for 3-4 ounces (20-30 grams) protein per meal. Lean proteins include chicken, turkey, fish, lean cuts of beef and 90% lean ground beef, pork, shrimp, low fat dairy products, and eggs.   3.          Fill half your plate with non-starchy vegetables. Select high fiber starches like  sweet potatoes, peas, beans, lentils, quinoa, whole wheat breads and pastas, and brown rice. Keep portion of starches to 1/4 plate (1/2 cup - 1 cup per meal).  4. Continue to  drink 64oz water daily.  5. Practice no drinking 30 minutes before meals, nothing with meals and wait 30 minutes after meals to drink again. Make meals last 30 minutes-chew thoroughly.    6. Increase your daily step goal to 7215-6524/day. Consider trying other exercise activity such as seated exercises with weights or resistance bands.      Pre-op Goal weight: lose 5% of body weight    Nutrition Monitoring and Evaluation: 1-2 pound weight loss per week  Criteria: weight check  Need for Follow-up: 6 weeks

## 2024-06-26 ENCOUNTER — APPOINTMENT (OUTPATIENT)
Dept: RADIOLOGY | Facility: HOSPITAL | Age: 39
End: 2024-06-26
Payer: COMMERCIAL

## 2024-06-26 ENCOUNTER — HOSPITAL ENCOUNTER (INPATIENT)
Facility: HOSPITAL | Age: 39
LOS: 2 days | Discharge: HOME | End: 2024-06-29
Attending: STUDENT IN AN ORGANIZED HEALTH CARE EDUCATION/TRAINING PROGRAM | Admitting: STUDENT IN AN ORGANIZED HEALTH CARE EDUCATION/TRAINING PROGRAM
Payer: COMMERCIAL

## 2024-06-26 ENCOUNTER — APPOINTMENT (OUTPATIENT)
Dept: CARDIOLOGY | Facility: HOSPITAL | Age: 39
End: 2024-06-26
Payer: COMMERCIAL

## 2024-06-26 DIAGNOSIS — R11.0 NAUSEA: ICD-10-CM

## 2024-06-26 DIAGNOSIS — I27.82 OTHER CHRONIC PULMONARY EMBOLISM WITHOUT ACUTE COR PULMONALE (MULTI): ICD-10-CM

## 2024-06-26 DIAGNOSIS — I95.3 HEMODIALYSIS-ASSOCIATED HYPOTENSION: ICD-10-CM

## 2024-06-26 DIAGNOSIS — D72.829 LEUKOCYTOSIS, UNSPECIFIED TYPE: ICD-10-CM

## 2024-06-26 DIAGNOSIS — L02.214 CUTANEOUS ABSCESS OF GROIN: ICD-10-CM

## 2024-06-26 DIAGNOSIS — N18.6 END STAGE RENAL DISEASE (MULTI): ICD-10-CM

## 2024-06-26 DIAGNOSIS — E66.01 CLASS 2 SEVERE OBESITY DUE TO EXCESS CALORIES WITH SERIOUS COMORBIDITY IN ADULT, UNSPECIFIED BMI (MULTI): ICD-10-CM

## 2024-06-26 DIAGNOSIS — R51.9 NONINTRACTABLE HEADACHE, UNSPECIFIED CHRONICITY PATTERN, UNSPECIFIED HEADACHE TYPE: Primary | ICD-10-CM

## 2024-06-26 DIAGNOSIS — R42 DIZZINESS: ICD-10-CM

## 2024-06-26 LAB
ALBUMIN SERPL BCP-MCNC: 3.5 G/DL (ref 3.4–5)
ALP SERPL-CCNC: 90 U/L (ref 33–110)
ALT SERPL W P-5'-P-CCNC: 7 U/L (ref 7–45)
ANION GAP SERPL CALC-SCNC: 11 MMOL/L (ref 10–20)
AST SERPL W P-5'-P-CCNC: 11 U/L (ref 9–39)
B-HCG SERPL-ACNC: <2 MIU/ML
BASOPHILS # BLD AUTO: 0.03 X10*3/UL (ref 0–0.1)
BASOPHILS NFR BLD AUTO: 0.2 %
BILIRUB SERPL-MCNC: 0.5 MG/DL (ref 0–1.2)
BUN SERPL-MCNC: 30 MG/DL (ref 6–23)
CALCIUM SERPL-MCNC: 7.4 MG/DL (ref 8.6–10.3)
CARDIAC TROPONIN I PNL SERPL HS: 13 NG/L (ref 0–13)
CHLORIDE SERPL-SCNC: 98 MMOL/L (ref 98–107)
CO2 SERPL-SCNC: 29 MMOL/L (ref 21–32)
CREAT SERPL-MCNC: 5.45 MG/DL (ref 0.5–1.05)
EGFRCR SERPLBLD CKD-EPI 2021: 10 ML/MIN/1.73M*2
EOSINOPHIL # BLD AUTO: 0.03 X10*3/UL (ref 0–0.7)
EOSINOPHIL NFR BLD AUTO: 0.2 %
ERYTHROCYTE [DISTWIDTH] IN BLOOD BY AUTOMATED COUNT: 13.2 % (ref 11.5–14.5)
GLUCOSE BLD MANUAL STRIP-MCNC: 125 MG/DL (ref 74–99)
GLUCOSE SERPL-MCNC: 122 MG/DL (ref 74–99)
HCT VFR BLD AUTO: 32.3 % (ref 36–46)
HGB BLD-MCNC: 10.1 G/DL (ref 12–16)
IMM GRANULOCYTES # BLD AUTO: 0.11 X10*3/UL (ref 0–0.7)
IMM GRANULOCYTES NFR BLD AUTO: 0.7 % (ref 0–0.9)
LACTATE SERPL-SCNC: 1.1 MMOL/L (ref 0.4–2)
LYMPHOCYTES # BLD AUTO: 0.84 X10*3/UL (ref 1.2–4.8)
LYMPHOCYTES NFR BLD AUTO: 5.4 %
MCH RBC QN AUTO: 28.5 PG (ref 26–34)
MCHC RBC AUTO-ENTMCNC: 31.3 G/DL (ref 32–36)
MCV RBC AUTO: 91 FL (ref 80–100)
MONOCYTES # BLD AUTO: 0.39 X10*3/UL (ref 0.1–1)
MONOCYTES NFR BLD AUTO: 2.5 %
NEUTROPHILS # BLD AUTO: 14.07 X10*3/UL (ref 1.2–7.7)
NEUTROPHILS NFR BLD AUTO: 91 %
NRBC BLD-RTO: 0 /100 WBCS (ref 0–0)
PLATELET # BLD AUTO: 130 X10*3/UL (ref 150–450)
POTASSIUM SERPL-SCNC: 3.5 MMOL/L (ref 3.5–5.3)
PROT SERPL-MCNC: 6.7 G/DL (ref 6.4–8.2)
RBC # BLD AUTO: 3.54 X10*6/UL (ref 4–5.2)
SARS-COV-2 RNA RESP QL NAA+PROBE: NOT DETECTED
SODIUM SERPL-SCNC: 134 MMOL/L (ref 136–145)
WBC # BLD AUTO: 15.5 X10*3/UL (ref 4.4–11.3)

## 2024-06-26 PROCEDURE — 82947 ASSAY GLUCOSE BLOOD QUANT: CPT

## 2024-06-26 PROCEDURE — G0378 HOSPITAL OBSERVATION PER HR: HCPCS

## 2024-06-26 PROCEDURE — 80053 COMPREHEN METABOLIC PANEL: CPT | Performed by: STUDENT IN AN ORGANIZED HEALTH CARE EDUCATION/TRAINING PROGRAM

## 2024-06-26 PROCEDURE — 36415 COLL VENOUS BLD VENIPUNCTURE: CPT | Performed by: STUDENT IN AN ORGANIZED HEALTH CARE EDUCATION/TRAINING PROGRAM

## 2024-06-26 PROCEDURE — 84702 CHORIONIC GONADOTROPIN TEST: CPT | Performed by: STUDENT IN AN ORGANIZED HEALTH CARE EDUCATION/TRAINING PROGRAM

## 2024-06-26 PROCEDURE — 83605 ASSAY OF LACTIC ACID: CPT | Performed by: STUDENT IN AN ORGANIZED HEALTH CARE EDUCATION/TRAINING PROGRAM

## 2024-06-26 PROCEDURE — 99285 EMERGENCY DEPT VISIT HI MDM: CPT

## 2024-06-26 PROCEDURE — 85025 COMPLETE CBC W/AUTO DIFF WBC: CPT | Performed by: STUDENT IN AN ORGANIZED HEALTH CARE EDUCATION/TRAINING PROGRAM

## 2024-06-26 PROCEDURE — 2500000004 HC RX 250 GENERAL PHARMACY W/ HCPCS (ALT 636 FOR OP/ED): Performed by: STUDENT IN AN ORGANIZED HEALTH CARE EDUCATION/TRAINING PROGRAM

## 2024-06-26 PROCEDURE — 96361 HYDRATE IV INFUSION ADD-ON: CPT

## 2024-06-26 PROCEDURE — 96375 TX/PRO/DX INJ NEW DRUG ADDON: CPT

## 2024-06-26 PROCEDURE — 84484 ASSAY OF TROPONIN QUANT: CPT | Performed by: STUDENT IN AN ORGANIZED HEALTH CARE EDUCATION/TRAINING PROGRAM

## 2024-06-26 PROCEDURE — 96365 THER/PROPH/DIAG IV INF INIT: CPT | Mod: 59

## 2024-06-26 PROCEDURE — 87635 SARS-COV-2 COVID-19 AMP PRB: CPT | Performed by: STUDENT IN AN ORGANIZED HEALTH CARE EDUCATION/TRAINING PROGRAM

## 2024-06-26 PROCEDURE — 87040 BLOOD CULTURE FOR BACTERIA: CPT | Mod: ELYLAB | Performed by: STUDENT IN AN ORGANIZED HEALTH CARE EDUCATION/TRAINING PROGRAM

## 2024-06-26 PROCEDURE — 87040 BLOOD CULTURE FOR BACTERIA: CPT | Mod: 91,ELYLAB | Performed by: EMERGENCY MEDICINE

## 2024-06-26 PROCEDURE — 2500000001 HC RX 250 WO HCPCS SELF ADMINISTERED DRUGS (ALT 637 FOR MEDICARE OP)

## 2024-06-26 PROCEDURE — 70450 CT HEAD/BRAIN W/O DYE: CPT

## 2024-06-26 PROCEDURE — 93005 ELECTROCARDIOGRAM TRACING: CPT

## 2024-06-26 PROCEDURE — 70450 CT HEAD/BRAIN W/O DYE: CPT | Performed by: RADIOLOGY

## 2024-06-26 PROCEDURE — 2500000004 HC RX 250 GENERAL PHARMACY W/ HCPCS (ALT 636 FOR OP/ED): Performed by: EMERGENCY MEDICINE

## 2024-06-26 RX ORDER — TRAMADOL HYDROCHLORIDE 50 MG/1
50 TABLET ORAL EVERY 12 HOURS PRN
Status: DISCONTINUED | OUTPATIENT
Start: 2024-06-26 | End: 2024-06-26

## 2024-06-26 RX ORDER — DEXTROSE 50 % IN WATER (D50W) INTRAVENOUS SYRINGE
25
Status: DISCONTINUED | OUTPATIENT
Start: 2024-06-26 | End: 2024-06-29 | Stop reason: HOSPADM

## 2024-06-26 RX ORDER — MECLIZINE HCL 12.5 MG 12.5 MG/1
25 TABLET ORAL 3 TIMES DAILY PRN
Status: DISCONTINUED | OUTPATIENT
Start: 2024-06-26 | End: 2024-06-29 | Stop reason: HOSPADM

## 2024-06-26 RX ORDER — POLYETHYLENE GLYCOL 3350 17 G/17G
17 POWDER, FOR SOLUTION ORAL DAILY
Status: DISCONTINUED | OUTPATIENT
Start: 2024-06-27 | End: 2024-06-29 | Stop reason: HOSPADM

## 2024-06-26 RX ORDER — PANTOPRAZOLE SODIUM 40 MG/1
40 TABLET, DELAYED RELEASE ORAL DAILY
Status: DISCONTINUED | OUTPATIENT
Start: 2024-06-27 | End: 2024-06-29 | Stop reason: HOSPADM

## 2024-06-26 RX ORDER — METOCLOPRAMIDE HYDROCHLORIDE 5 MG/ML
10 INJECTION INTRAMUSCULAR; INTRAVENOUS ONCE
Status: COMPLETED | OUTPATIENT
Start: 2024-06-26 | End: 2024-06-26

## 2024-06-26 RX ORDER — ACETAMINOPHEN 650 MG/1
650 SUPPOSITORY RECTAL EVERY 4 HOURS PRN
Status: DISCONTINUED | OUTPATIENT
Start: 2024-06-26 | End: 2024-06-29 | Stop reason: HOSPADM

## 2024-06-26 RX ORDER — TRAMADOL HYDROCHLORIDE 50 MG/1
25 TABLET ORAL EVERY 12 HOURS PRN
Status: DISCONTINUED | OUTPATIENT
Start: 2024-06-26 | End: 2024-06-29 | Stop reason: HOSPADM

## 2024-06-26 RX ORDER — INSULIN LISPRO 100 [IU]/ML
0-10 INJECTION, SOLUTION INTRAVENOUS; SUBCUTANEOUS
Status: DISCONTINUED | OUTPATIENT
Start: 2024-06-27 | End: 2024-06-29 | Stop reason: HOSPADM

## 2024-06-26 RX ORDER — ACETAMINOPHEN 325 MG/1
650 TABLET ORAL EVERY 4 HOURS PRN
Status: DISCONTINUED | OUTPATIENT
Start: 2024-06-26 | End: 2024-06-29 | Stop reason: HOSPADM

## 2024-06-26 RX ORDER — ACETAMINOPHEN 325 MG/1
1000 TABLET ORAL ONCE
Status: DISCONTINUED | OUTPATIENT
Start: 2024-06-26 | End: 2024-06-29 | Stop reason: HOSPADM

## 2024-06-26 RX ORDER — ONDANSETRON 4 MG/1
4 TABLET, ORALLY DISINTEGRATING ORAL EVERY 8 HOURS PRN
Status: DISCONTINUED | OUTPATIENT
Start: 2024-06-26 | End: 2024-06-29 | Stop reason: HOSPADM

## 2024-06-26 RX ORDER — ONDANSETRON HYDROCHLORIDE 2 MG/ML
4 INJECTION, SOLUTION INTRAVENOUS ONCE
Status: COMPLETED | OUTPATIENT
Start: 2024-06-26 | End: 2024-06-26

## 2024-06-26 RX ORDER — ACETAMINOPHEN 500 MG
5 TABLET ORAL NIGHTLY PRN
Status: DISCONTINUED | OUTPATIENT
Start: 2024-06-26 | End: 2024-06-29 | Stop reason: HOSPADM

## 2024-06-26 RX ORDER — PANTOPRAZOLE SODIUM 40 MG/10ML
40 INJECTION, POWDER, LYOPHILIZED, FOR SOLUTION INTRAVENOUS DAILY
Status: DISCONTINUED | OUTPATIENT
Start: 2024-06-27 | End: 2024-06-29 | Stop reason: HOSPADM

## 2024-06-26 RX ORDER — SODIUM CHLORIDE 9 MG/ML
10 INJECTION, SOLUTION INTRAVENOUS EVERY 12 HOURS
Status: DISCONTINUED | OUTPATIENT
Start: 2024-06-27 | End: 2024-06-29 | Stop reason: HOSPADM

## 2024-06-26 RX ORDER — FENTANYL CITRATE 50 UG/ML
25 INJECTION, SOLUTION INTRAMUSCULAR; INTRAVENOUS ONCE
Status: COMPLETED | OUTPATIENT
Start: 2024-06-26 | End: 2024-06-26

## 2024-06-26 RX ORDER — ACETAMINOPHEN 160 MG/5ML
650 SOLUTION ORAL EVERY 4 HOURS PRN
Status: DISCONTINUED | OUTPATIENT
Start: 2024-06-26 | End: 2024-06-29 | Stop reason: HOSPADM

## 2024-06-26 RX ORDER — DIPHENHYDRAMINE HYDROCHLORIDE 50 MG/ML
25 INJECTION INTRAMUSCULAR; INTRAVENOUS ONCE
Status: COMPLETED | OUTPATIENT
Start: 2024-06-26 | End: 2024-06-26

## 2024-06-26 RX ORDER — ONDANSETRON HYDROCHLORIDE 2 MG/ML
4 INJECTION, SOLUTION INTRAVENOUS EVERY 8 HOURS PRN
Status: DISCONTINUED | OUTPATIENT
Start: 2024-06-26 | End: 2024-06-29 | Stop reason: HOSPADM

## 2024-06-26 RX ORDER — DEXTROSE 50 % IN WATER (D50W) INTRAVENOUS SYRINGE
12.5
Status: DISCONTINUED | OUTPATIENT
Start: 2024-06-26 | End: 2024-06-29 | Stop reason: HOSPADM

## 2024-06-26 SDOH — SOCIAL STABILITY: SOCIAL INSECURITY: DOES ANYONE TRY TO KEEP YOU FROM HAVING/CONTACTING OTHER FRIENDS OR DOING THINGS OUTSIDE YOUR HOME?: NO

## 2024-06-26 SDOH — SOCIAL STABILITY: SOCIAL INSECURITY: DO YOU FEEL ANYONE HAS EXPLOITED OR TAKEN ADVANTAGE OF YOU FINANCIALLY OR OF YOUR PERSONAL PROPERTY?: NO

## 2024-06-26 SDOH — SOCIAL STABILITY: SOCIAL INSECURITY: ARE YOU OR HAVE YOU BEEN THREATENED OR ABUSED PHYSICALLY, EMOTIONALLY, OR SEXUALLY BY ANYONE?: NO

## 2024-06-26 SDOH — SOCIAL STABILITY: SOCIAL INSECURITY: ABUSE: ADULT

## 2024-06-26 SDOH — SOCIAL STABILITY: SOCIAL INSECURITY: ARE THERE ANY APPARENT SIGNS OF INJURIES/BEHAVIORS THAT COULD BE RELATED TO ABUSE/NEGLECT?: NO

## 2024-06-26 SDOH — SOCIAL STABILITY: SOCIAL INSECURITY: WERE YOU ABLE TO COMPLETE ALL THE BEHAVIORAL HEALTH SCREENINGS?: YES

## 2024-06-26 SDOH — SOCIAL STABILITY: SOCIAL INSECURITY: DO YOU FEEL UNSAFE GOING BACK TO THE PLACE WHERE YOU ARE LIVING?: NO

## 2024-06-26 SDOH — SOCIAL STABILITY: SOCIAL INSECURITY: HAVE YOU HAD ANY THOUGHTS OF HARMING ANYONE ELSE?: NO

## 2024-06-26 SDOH — SOCIAL STABILITY: SOCIAL INSECURITY: HAVE YOU HAD THOUGHTS OF HARMING ANYONE ELSE?: NO

## 2024-06-26 SDOH — SOCIAL STABILITY: SOCIAL INSECURITY: HAS ANYONE EVER THREATENED TO HURT YOUR FAMILY OR YOUR PETS?: NO

## 2024-06-26 ASSESSMENT — LIFESTYLE VARIABLES
EVER FELT BAD OR GUILTY ABOUT YOUR DRINKING: NO
AUDIT-C TOTAL SCORE: 0
EVER HAD A DRINK FIRST THING IN THE MORNING TO STEADY YOUR NERVES TO GET RID OF A HANGOVER: NO
HOW OFTEN DO YOU HAVE A DRINK CONTAINING ALCOHOL: NEVER
HAVE PEOPLE ANNOYED YOU BY CRITICIZING YOUR DRINKING: NO
HAVE YOU EVER FELT YOU SHOULD CUT DOWN ON YOUR DRINKING: NO
HOW MANY STANDARD DRINKS CONTAINING ALCOHOL DO YOU HAVE ON A TYPICAL DAY: PATIENT DOES NOT DRINK
SUBSTANCE_ABUSE_PAST_12_MONTHS: NO
SKIP TO QUESTIONS 9-10: 1
PRESCIPTION_ABUSE_PAST_12_MONTHS: NO
HOW OFTEN DO YOU HAVE 6 OR MORE DRINKS ON ONE OCCASION: NEVER
TOTAL SCORE: 0
AUDIT-C TOTAL SCORE: 0

## 2024-06-26 ASSESSMENT — COGNITIVE AND FUNCTIONAL STATUS - GENERAL
PERSONAL GROOMING: A LITTLE
MOVING FROM LYING ON BACK TO SITTING ON SIDE OF FLAT BED WITH BEDRAILS: A LITTLE
DRESSING REGULAR UPPER BODY CLOTHING: A LITTLE
MOVING TO AND FROM BED TO CHAIR: A LITTLE
STANDING UP FROM CHAIR USING ARMS: A LITTLE
DAILY ACTIVITIY SCORE: 19
WALKING IN HOSPITAL ROOM: A LITTLE
MOBILITY SCORE: 18
TOILETING: A LITTLE
CLIMB 3 TO 5 STEPS WITH RAILING: A LITTLE
TURNING FROM BACK TO SIDE WHILE IN FLAT BAD: A LITTLE
PATIENT BASELINE BEDBOUND: NO
DRESSING REGULAR LOWER BODY CLOTHING: A LITTLE
HELP NEEDED FOR BATHING: A LITTLE

## 2024-06-26 ASSESSMENT — ACTIVITIES OF DAILY LIVING (ADL)
FEEDING YOURSELF: INDEPENDENT
DRESSING YOURSELF: NEEDS ASSISTANCE
GROOMING: NEEDS ASSISTANCE
ADEQUATE_TO_COMPLETE_ADL: YES
WALKS IN HOME: NEEDS ASSISTANCE
HEARING - RIGHT EAR: FUNCTIONAL
BATHING: NEEDS ASSISTANCE
JUDGMENT_ADEQUATE_SAFELY_COMPLETE_DAILY_ACTIVITIES: YES
ASSISTIVE_DEVICE: WALKER
PATIENT'S MEMORY ADEQUATE TO SAFELY COMPLETE DAILY ACTIVITIES?: YES
TOILETING: NEEDS ASSISTANCE
HEARING - LEFT EAR: FUNCTIONAL

## 2024-06-26 ASSESSMENT — PAIN SCALES - GENERAL
PAINLEVEL_OUTOF10: 3
PAINLEVEL_OUTOF10: 10 - WORST POSSIBLE PAIN
PAINLEVEL_OUTOF10: 10 - WORST POSSIBLE PAIN
PAINLEVEL_OUTOF10: 3
PAINLEVEL_OUTOF10: 6

## 2024-06-26 ASSESSMENT — PAIN DESCRIPTION - LOCATION
LOCATION: HEAD
LOCATION: GENERALIZED

## 2024-06-26 ASSESSMENT — ENCOUNTER SYMPTOMS
HEADACHES: 1
DIZZINESS: 1

## 2024-06-26 ASSESSMENT — PAIN - FUNCTIONAL ASSESSMENT
PAIN_FUNCTIONAL_ASSESSMENT: 0-10
PAIN_FUNCTIONAL_ASSESSMENT: 0-10

## 2024-06-26 ASSESSMENT — PATIENT HEALTH QUESTIONNAIRE - PHQ9
SUM OF ALL RESPONSES TO PHQ9 QUESTIONS 1 & 2: 0
2. FEELING DOWN, DEPRESSED OR HOPELESS: NOT AT ALL
1. LITTLE INTEREST OR PLEASURE IN DOING THINGS: NOT AT ALL

## 2024-06-26 NOTE — PROGRESS NOTES
Emergency Medicine Transition of Care Note.    I received Alexus Haley in signout from Dr. Mejia.  Please see the previous ED provider note for all HPI, PE and MDM up to the time of signout at 1733. This is in addition to the primary record.    In brief Alexus Haley is an 39 y.o. female presenting for   Chief Complaint   Patient presents with    Dizziness     At the time of signout we were awaiting: CT head and reevaluation.    ED Course as of 06/28/24 2054 Wed Jun 26, 2024 1910 Reassessed patient.  Patient notes she still has a mild headache.  Patient is tolerating p.o.  Will ambulate patient.  Discussed leukocytosis.  Patient states her white count is always high and they do not know why. [JA]      ED Course User Index  [JA] Arvin Rice DO         Diagnoses as of 06/28/24 2054   Hemodialysis-associated hypotension   Nonintractable headache, unspecified chronicity pattern, unspecified headache type   Nausea   Leukocytosis, unspecified type   Cutaneous abscess of groin       Medical Decision Making  CT head negative.  No neurodeficit on exam.  Troponin negative.  Blood pressure improving.  Will need to ambulate patient.  Patient still complain of dizziness.    Patient care transferred to oncoming physician pending ambulatory trial and reevaluation.        Final diagnoses:   [I95.3] Hemodialysis-associated hypotension   [R51.9] Nonintractable headache, unspecified chronicity pattern, unspecified headache type   [R11.0] Nausea   [D72.829] Leukocytosis, unspecified type   [L02.214] Cutaneous abscess of groin           Procedure  Procedures    Arvin Rice DO

## 2024-06-26 NOTE — Clinical Note
Pt in IR for removal of permacath.  I removed dressing, insertion site clean, no redness at site, sutures were already removed from skin.

## 2024-06-26 NOTE — ED PROVIDER NOTES
HPI   Chief Complaint   Patient presents with    Dizziness       39-year-old female with a history of diabetes, hypertension, ESRD on hemodialysis presenting from dialysis via EMS.  Patient had a complete run of dialysis but during dialysis they started giving patient iron infusion and patient started shaking, became lightheaded and vomited.  Patient's blood pressure for EMS low in the 90s systolic.  Patient denies any fevers, chest pain or shortness of breath.  Reports still feeling nauseous and lightheaded.  Initial blood pressure 102/52.      History provided by:  Patient and EMS personnel                      [place] Coma Scale Score: 15                     Patient History   Past Medical History:   Diagnosis Date    Asthma (HHS-HCC)     Chronic kidney disease     Diabetes mellitus (Multi)     Hyperlipemia     Hypertension      Past Surgical History:   Procedure Laterality Date    DIALYSIS FISTULA CREATION Left     also has a port in the right chest    TONSILLECTOMY Bilateral      No family history on file.  Social History     Tobacco Use    Smoking status: Former     Current packs/day: 0.00     Types: Cigarettes    Smokeless tobacco: Never   Vaping Use    Vaping status: Never Used   Substance Use Topics    Alcohol use: Yes     Comment: rarely    Drug use: Never       Physical Exam   ED Triage Vitals [06/26/24 1223]   Temperature Heart Rate Respirations BP   36.8 °C (98.2 °F) (!) 111 18 102/52      Pulse Ox Temp Source Heart Rate Source Patient Position   98 % Temporal -- --      BP Location FiO2 (%)     -- --       Physical Exam  Vitals and nursing note reviewed.   Constitutional:       General: She is not in acute distress.  HENT:      Head: Atraumatic.      Mouth/Throat:      Mouth: Mucous membranes are moist.      Pharynx: Oropharynx is clear.   Eyes:      Conjunctiva/sclera: Conjunctivae normal.      Pupils: Pupils are equal, round, and reactive to light.   Cardiovascular:      Rate and Rhythm: Normal rate  and regular rhythm.      Pulses: Normal pulses.   Pulmonary:      Effort: Pulmonary effort is normal. No respiratory distress.      Breath sounds: Normal breath sounds.   Abdominal:      General: There is no distension.      Palpations: Abdomen is soft.      Tenderness: There is no abdominal tenderness.   Musculoskeletal:         General: No deformity.      Cervical back: Neck supple.   Skin:     General: Skin is warm and dry.   Neurological:      Mental Status: She is alert and oriented to person, place, and time. Mental status is at baseline.      Cranial Nerves: No cranial nerve deficit.      Sensory: No sensory deficit.      Motor: No weakness.   Psychiatric:         Mood and Affect: Mood normal.         Behavior: Behavior normal.         ED Course & MDM   Diagnoses as of 06/26/24 1743   Hemodialysis-associated hypotension   Nonintractable headache, unspecified chronicity pattern, unspecified headache type   Nausea     Labs Reviewed   CBC WITH AUTO DIFFERENTIAL - Abnormal       Result Value    WBC 15.5 (*)     nRBC 0.0      RBC 3.54 (*)     Hemoglobin 10.1 (*)     Hematocrit 32.3 (*)     MCV 91      MCH 28.5      MCHC 31.3 (*)     RDW 13.2      Platelets 130 (*)     Neutrophils % 91.0      Immature Granulocytes %, Automated 0.7      Lymphocytes % 5.4      Monocytes % 2.5      Eosinophils % 0.2      Basophils % 0.2      Neutrophils Absolute 14.07 (*)     Immature Granulocytes Absolute, Automated 0.11      Lymphocytes Absolute 0.84 (*)     Monocytes Absolute 0.39      Eosinophils Absolute 0.03      Basophils Absolute 0.03     COMPREHENSIVE METABOLIC PANEL - Abnormal    Glucose 122 (*)     Sodium 134 (*)     Potassium 3.5      Chloride 98      Bicarbonate 29      Anion Gap 11      Urea Nitrogen 30 (*)     Creatinine 5.45 (*)     eGFR 10 (*)     Calcium 7.4 (*)     Albumin 3.5      Alkaline Phosphatase 90      Total Protein 6.7      AST 11      Bilirubin, Total 0.5      ALT 7     LACTATE - Normal    Lactate 1.1       Narrative:     Venipuncture immediately after or during the administration of Metamizole may lead to falsely low results. Testing should be performed immediately  prior to Metamizole dosing.   TROPONIN I, HIGH SENSITIVITY - Normal    Troponin I, High Sensitivity 13      Narrative:     Less than 99th percentile of normal range cutoff-  Female and children under 18 years old <14 ng/L; Male <21 ng/L: Negative  Repeat testing should be performed if clinically indicated.     Female and children under 18 years old 14-50 ng/L; Male 21-50 ng/L:  Consistent with possible cardiac damage and possible increased clinical   risk. Serial measurements may help to assess extent of myocardial damage.     >50 ng/L: Consistent with cardiac damage, increased clinical risk and  myocardial infarction. Serial measurements may help assess extent of   myocardial damage.      NOTE: Children less than 1 year old may have higher baseline troponin   levels and results should be interpreted in conjunction with the overall   clinical context.     NOTE: Troponin I testing is performed using a different   testing methodology at [facility] than at other   [facility]. Direct result comparisons should only   be made within the same method.   HUMAN CHORIONIC GONADOTROPIN, SERUM QUANTITATIVE - Normal    HCG, Beta-Quantitative <2      Narrative:      Total HCG measurement is performed using the [employer] Access   Immunoassay which detects intact HCG and free beta HCG subunit.    This test is not indicated for use as a tumor marker.   HCG testing is performed using a different test methodology at [facility] than other [facility]. Direct result comparison   should only be made within the same method.       SARS-COV-2 PCR - Normal    Coronavirus 2019, PCR Not Detected      Narrative:     This assay has received FDA Emergency Use Authorization (EUA) and is only authorized for the duration of time that  circumstances exist to justify the authorization of the emergency use of in vitro diagnostic tests for the detection of SARS-CoV-2 virus and/or diagnosis of COVID-19 infection under section 564(b)(1) of the Act, 21 U.S.C. 360bbb-3(b)(1). This assay is an in vitro diagnostic nucleic acid amplification test for the qualitative detection of SARS-CoV-2 from nasopharyngeal specimens and has been validated for use at [facility]. Negative results do not preclude COVID-19 infections and should not be used as the sole basis for diagnosis, treatment, or other management decisions.     BLOOD CULTURE   BLOOD CULTURE     CT head wo IV contrast    (Results Pending)         Medical Decision Making  39-year-old female with history of ESRD on hemodialysis, hypertension, diabetes presenting from dialysis after receiving full run with lightheadedness, nausea and headache.  Neurologically intact.  Patient awake alert on evaluation, initial blood pressure low for patient, 102/52.  Patient mildly tachycardic.  Patient is afebrile.  Lungs are clear on exam.  Normal pulses.  Patient given small IV fluid bolus, Zofran IV for symptomatic relief.  Blood work obtained.    Patient's lab work reviewed.  CBC with mild leukocytosis with white count of 15.5.  H&H is stable from patient's baseline.  Metabolic panel consistent with ESRD.  Normal potassium.  Lactic acid within normal limits.  Normal serum troponin.  Negative hCG.  COVID PCR testing negative.  Patient had received vancomycin IV with dialysis prior to arrival.  Unclear etiology of patient's leukocytosis.  No obvious sources of infection currently.  Patient's headache was treated with Reglan and Benadryl IV.  Patient given a total of 500 mL IV fluids with improvement in heart rate and blood pressure.  Patient continues to have mild diffuse headache.  Will obtain CT imaging of the head.  Signed over to oncoming physician pending results of CT imaging and  reevaluation.    Amount and/or Complexity of Data Reviewed  ECG/medicine tests: ordered and independent interpretation performed. Decision-making details documented in ED Course.     Details: Twelve-lead ECG obtained at 1231 by my interpretation demonstrates sinus tachycardia with a rate of 113, no acute ST elevation or depression.        Procedure  Procedures     Serafin Hubbard MD  06/26/24 0958

## 2024-06-26 NOTE — Clinical Note
Dr. Schoenberger was able to remove permacath without difficulty, tip intact and sent for culture. I held pressure for about 20 minutes since site had a very slow bleed.  I placed steristrips/vaseline gauze/2x2 gauze and tegaderm on site, bleeding had stopped. Dressing needs to remain on for 48 hours and steristrips 7 days and pt is aware after 7 days she can remove steristrips if still on. Pt sent back to floor via transport. Report se nt to PASCUAL Navarro.

## 2024-06-27 ENCOUNTER — APPOINTMENT (OUTPATIENT)
Dept: RADIOLOGY | Facility: HOSPITAL | Age: 39
End: 2024-06-27
Payer: COMMERCIAL

## 2024-06-27 ENCOUNTER — NUTRITION (OUTPATIENT)
Dept: SURGERY | Facility: CLINIC | Age: 39
End: 2024-06-27
Payer: COMMERCIAL

## 2024-06-27 ENCOUNTER — DOCUMENTATION (OUTPATIENT)
Dept: RADIOLOGY | Facility: CLINIC | Age: 39
End: 2024-06-27
Payer: COMMERCIAL

## 2024-06-27 VITALS — BODY MASS INDEX: 42.73 KG/M2 | WEIGHT: 272.8 LBS

## 2024-06-27 DIAGNOSIS — Z98.84 BARIATRIC SURGERY STATUS: ICD-10-CM

## 2024-06-27 DIAGNOSIS — E66.01 CLASS 3 SEVERE OBESITY WITH SERIOUS COMORBIDITY AND BODY MASS INDEX (BMI) OF 40.0 TO 44.9 IN ADULT, UNSPECIFIED OBESITY TYPE (MULTI): Primary | ICD-10-CM

## 2024-06-27 PROBLEM — R51.9 NONINTRACTABLE HEADACHE, UNSPECIFIED CHRONICITY PATTERN, UNSPECIFIED HEADACHE TYPE: Status: ACTIVE | Noted: 2024-06-27

## 2024-06-27 LAB
ANION GAP SERPL CALC-SCNC: 25 MMOL/L (ref 10–20)
BUN SERPL-MCNC: 39 MG/DL (ref 6–23)
CALCIUM SERPL-MCNC: 6.9 MG/DL (ref 8.6–10.3)
CHLORIDE SERPL-SCNC: 95 MMOL/L (ref 98–107)
CO2 SERPL-SCNC: 25 MMOL/L (ref 21–32)
CREAT SERPL-MCNC: 6.85 MG/DL (ref 0.5–1.05)
EGFRCR SERPLBLD CKD-EPI 2021: 7 ML/MIN/1.73M*2
ERYTHROCYTE [DISTWIDTH] IN BLOOD BY AUTOMATED COUNT: 13.3 % (ref 11.5–14.5)
GLUCOSE BLD MANUAL STRIP-MCNC: 117 MG/DL (ref 74–99)
GLUCOSE BLD MANUAL STRIP-MCNC: 124 MG/DL (ref 74–99)
GLUCOSE BLD MANUAL STRIP-MCNC: 132 MG/DL (ref 74–99)
GLUCOSE BLD MANUAL STRIP-MCNC: 178 MG/DL (ref 74–99)
GLUCOSE SERPL-MCNC: 131 MG/DL (ref 74–99)
HCT VFR BLD AUTO: 29 % (ref 36–46)
HGB BLD-MCNC: 9 G/DL (ref 12–16)
HOLD SPECIMEN: NORMAL
MCH RBC QN AUTO: 28.5 PG (ref 26–34)
MCHC RBC AUTO-ENTMCNC: 31 G/DL (ref 32–36)
MCV RBC AUTO: 92 FL (ref 80–100)
NRBC BLD-RTO: 0 /100 WBCS (ref 0–0)
PLATELET # BLD AUTO: 129 X10*3/UL (ref 150–450)
POTASSIUM SERPL-SCNC: 4.5 MMOL/L (ref 3.5–5.3)
RBC # BLD AUTO: 3.16 X10*6/UL (ref 4–5.2)
SODIUM SERPL-SCNC: 140 MMOL/L (ref 136–145)
WBC # BLD AUTO: 10.8 X10*3/UL (ref 4.4–11.3)

## 2024-06-27 PROCEDURE — 87185 SC STD ENZYME DETCJ PER NZM: CPT | Mod: ELYLAB

## 2024-06-27 PROCEDURE — 80048 BASIC METABOLIC PNL TOTAL CA: CPT

## 2024-06-27 PROCEDURE — 2500000004 HC RX 250 GENERAL PHARMACY W/ HCPCS (ALT 636 FOR OP/ED): Performed by: INTERNAL MEDICINE

## 2024-06-27 PROCEDURE — 5A1D70Z PERFORMANCE OF URINARY FILTRATION, INTERMITTENT, LESS THAN 6 HOURS PER DAY: ICD-10-PCS | Performed by: INTERNAL MEDICINE

## 2024-06-27 PROCEDURE — 0J9C3ZZ DRAINAGE OF PELVIC REGION SUBCUTANEOUS TISSUE AND FASCIA, PERCUTANEOUS APPROACH: ICD-10-PCS

## 2024-06-27 PROCEDURE — 2720000007 HC OR 272 NO HCPCS

## 2024-06-27 PROCEDURE — 2500000004 HC RX 250 GENERAL PHARMACY W/ HCPCS (ALT 636 FOR OP/ED): Performed by: PHARMACIST

## 2024-06-27 PROCEDURE — 2500000001 HC RX 250 WO HCPCS SELF ADMINISTERED DRUGS (ALT 637 FOR MEDICARE OP): Performed by: STUDENT IN AN ORGANIZED HEALTH CARE EDUCATION/TRAINING PROGRAM

## 2024-06-27 PROCEDURE — 96361 HYDRATE IV INFUSION ADD-ON: CPT | Mod: 59

## 2024-06-27 PROCEDURE — 36415 COLL VENOUS BLD VENIPUNCTURE: CPT

## 2024-06-27 PROCEDURE — 2500000002 HC RX 250 W HCPCS SELF ADMINISTERED DRUGS (ALT 637 FOR MEDICARE OP, ALT 636 FOR OP/ED)

## 2024-06-27 PROCEDURE — 76937 US GUIDE VASCULAR ACCESS: CPT

## 2024-06-27 PROCEDURE — 2500000005 HC RX 250 GENERAL PHARMACY W/O HCPCS: Performed by: PHARMACIST

## 2024-06-27 PROCEDURE — 99232 SBSQ HOSP IP/OBS MODERATE 35: CPT | Performed by: STUDENT IN AN ORGANIZED HEALTH CARE EDUCATION/TRAINING PROGRAM

## 2024-06-27 PROCEDURE — 2500000004 HC RX 250 GENERAL PHARMACY W/ HCPCS (ALT 636 FOR OP/ED)

## 2024-06-27 PROCEDURE — 1200000002 HC GENERAL ROOM WITH TELEMETRY DAILY

## 2024-06-27 PROCEDURE — 2500000002 HC RX 250 W HCPCS SELF ADMINISTERED DRUGS (ALT 637 FOR MEDICARE OP, ALT 636 FOR OP/ED): Performed by: STUDENT IN AN ORGANIZED HEALTH CARE EDUCATION/TRAINING PROGRAM

## 2024-06-27 PROCEDURE — C1751 CATH, INF, PER/CENT/MIDLINE: HCPCS

## 2024-06-27 PROCEDURE — 87070 CULTURE OTHR SPECIMN AEROBIC: CPT | Mod: 91,ELYLAB

## 2024-06-27 PROCEDURE — 85027 COMPLETE CBC AUTOMATED: CPT

## 2024-06-27 PROCEDURE — 82947 ASSAY GLUCOSE BLOOD QUANT: CPT | Mod: 91

## 2024-06-27 PROCEDURE — 2500000001 HC RX 250 WO HCPCS SELF ADMINISTERED DRUGS (ALT 637 FOR MEDICARE OP)

## 2024-06-27 PROCEDURE — 87070 CULTURE OTHR SPECIMN AEROBIC: CPT | Mod: ELYLAB

## 2024-06-27 PROCEDURE — 82947 ASSAY GLUCOSE BLOOD QUANT: CPT

## 2024-06-27 PROCEDURE — 10060 I&D ABSCESS SIMPLE/SINGLE: CPT | Performed by: SURGERY

## 2024-06-27 PROCEDURE — 87205 SMEAR GRAM STAIN: CPT | Mod: ELYLAB

## 2024-06-27 PROCEDURE — 2500000004 HC RX 250 GENERAL PHARMACY W/ HCPCS (ALT 636 FOR OP/ED): Performed by: STUDENT IN AN ORGANIZED HEALTH CARE EDUCATION/TRAINING PROGRAM

## 2024-06-27 PROCEDURE — 99221 1ST HOSP IP/OBS SF/LOW 40: CPT | Performed by: SURGERY

## 2024-06-27 RX ORDER — LIDOCAINE HYDROCHLORIDE 10 MG/ML
30 INJECTION INFILTRATION; PERINEURAL ONCE
Status: DISCONTINUED | OUTPATIENT
Start: 2024-06-27 | End: 2024-06-29 | Stop reason: HOSPADM

## 2024-06-27 RX ORDER — FENTANYL CITRATE 50 UG/ML
25 INJECTION, SOLUTION INTRAMUSCULAR; INTRAVENOUS ONCE
Status: DISCONTINUED | OUTPATIENT
Start: 2024-06-27 | End: 2024-06-27

## 2024-06-27 RX ORDER — INSULIN LISPRO 100 [IU]/ML
8 INJECTION, SOLUTION INTRAVENOUS; SUBCUTANEOUS
Status: DISCONTINUED | OUTPATIENT
Start: 2024-06-27 | End: 2024-06-29 | Stop reason: HOSPADM

## 2024-06-27 RX ORDER — ALBUTEROL SULFATE 0.83 MG/ML
2.5 SOLUTION RESPIRATORY (INHALATION) EVERY 6 HOURS PRN
Status: DISCONTINUED | OUTPATIENT
Start: 2024-06-27 | End: 2024-06-29 | Stop reason: HOSPADM

## 2024-06-27 RX ORDER — ATORVASTATIN CALCIUM 20 MG/1
20 TABLET, FILM COATED ORAL DAILY
Status: DISCONTINUED | OUTPATIENT
Start: 2024-06-27 | End: 2024-06-29 | Stop reason: HOSPADM

## 2024-06-27 RX ORDER — CALCIUM CARBONATE 200(500)MG
1000 TABLET,CHEWABLE ORAL DAILY
Status: DISCONTINUED | OUTPATIENT
Start: 2024-06-27 | End: 2024-06-29 | Stop reason: HOSPADM

## 2024-06-27 RX ORDER — FERROUS SULFATE 325(65) MG
1 TABLET ORAL EVERY OTHER DAY
Status: DISCONTINUED | OUTPATIENT
Start: 2024-06-28 | End: 2024-06-29 | Stop reason: HOSPADM

## 2024-06-27 RX ORDER — VANCOMYCIN HYDROCHLORIDE 1 G/20ML
INJECTION, POWDER, LYOPHILIZED, FOR SOLUTION INTRAVENOUS DAILY PRN
Status: DISCONTINUED | OUTPATIENT
Start: 2024-06-27 | End: 2024-06-29 | Stop reason: HOSPADM

## 2024-06-27 RX ORDER — SEVELAMER CARBONATE 800 MG/1
800 TABLET, FILM COATED ORAL
Status: DISCONTINUED | OUTPATIENT
Start: 2024-06-27 | End: 2024-06-29 | Stop reason: HOSPADM

## 2024-06-27 RX ORDER — METOPROLOL SUCCINATE 50 MG/1
50 TABLET, EXTENDED RELEASE ORAL DAILY
Status: DISCONTINUED | OUTPATIENT
Start: 2024-06-27 | End: 2024-06-29 | Stop reason: HOSPADM

## 2024-06-27 RX ORDER — INSULIN GLARGINE 100 [IU]/ML
40 INJECTION, SOLUTION SUBCUTANEOUS NIGHTLY
Status: DISCONTINUED | OUTPATIENT
Start: 2024-06-27 | End: 2024-06-29 | Stop reason: HOSPADM

## 2024-06-27 RX ORDER — HYDROMORPHONE HYDROCHLORIDE 1 MG/ML
1 INJECTION, SOLUTION INTRAMUSCULAR; INTRAVENOUS; SUBCUTANEOUS ONCE
Status: COMPLETED | OUTPATIENT
Start: 2024-06-27 | End: 2024-06-27

## 2024-06-27 RX ORDER — KETOROLAC TROMETHAMINE 30 MG/ML
15 INJECTION, SOLUTION INTRAMUSCULAR; INTRAVENOUS ONCE
Status: COMPLETED | OUTPATIENT
Start: 2024-06-27 | End: 2024-06-27

## 2024-06-27 RX ORDER — MONTELUKAST SODIUM 10 MG/1
10 TABLET ORAL DAILY
Status: DISCONTINUED | OUTPATIENT
Start: 2024-06-27 | End: 2024-06-29 | Stop reason: HOSPADM

## 2024-06-27 RX ORDER — ERGOCALCIFEROL 1.25 MG/1
1250 CAPSULE ORAL
Status: DISCONTINUED | OUTPATIENT
Start: 2024-06-30 | End: 2024-06-29 | Stop reason: HOSPADM

## 2024-06-27 RX ORDER — CYCLOBENZAPRINE HCL 10 MG
10 TABLET ORAL DAILY
Status: DISCONTINUED | OUTPATIENT
Start: 2024-06-27 | End: 2024-06-29 | Stop reason: HOSPADM

## 2024-06-27 RX ORDER — ALBUTEROL SULFATE 90 UG/1
2 AEROSOL, METERED RESPIRATORY (INHALATION) 4 TIMES DAILY PRN
Status: DISCONTINUED | OUTPATIENT
Start: 2024-06-27 | End: 2024-06-29 | Stop reason: HOSPADM

## 2024-06-27 ASSESSMENT — ENCOUNTER SYMPTOMS
ADENOPATHY: 0
EYES NEGATIVE: 1
AGITATION: 0
CARDIOVASCULAR NEGATIVE: 1
CONSTITUTIONAL NEGATIVE: 1
DIFFICULTY URINATING: 1
ABDOMINAL DISTENTION: 0
ACTIVITY CHANGE: 0
RESPIRATORY NEGATIVE: 1
GASTROINTESTINAL NEGATIVE: 1
MYALGIAS: 1
SHORTNESS OF BREATH: 0
HEADACHES: 1
ARTHRALGIAS: 0
WOUND: 1
DIZZINESS: 1
EYE DISCHARGE: 0

## 2024-06-27 ASSESSMENT — PAIN DESCRIPTION - LOCATION: LOCATION: HEAD

## 2024-06-27 ASSESSMENT — PAIN SCALES - GENERAL
PAINLEVEL_OUTOF10: 9
PAINLEVEL_OUTOF10: 10 - WORST POSSIBLE PAIN
PAINLEVEL_OUTOF10: 2

## 2024-06-27 ASSESSMENT — PAIN SCALES - PAIN ASSESSMENT IN ADVANCED DEMENTIA (PAINAD): TOTALSCORE: MEDICATION (SEE MAR)

## 2024-06-27 NOTE — PROGRESS NOTES
Emergency Medicine Transition of Care Note    I received Alexus Haley in signout from Dr. Rice.  Please see the previous ED provider note for all HPI, PE and MDM up to the time of signout at 7p. This is in addition to the primary record.    In brief Alexus Haley is an 39 y.o. female presenting for   Chief Complaint   Patient presents with    Dizziness     At the time of signout we were awaiting: Reevaluation and disposition    ED Course as of 06/26/24 2342 Wed Jun 26, 2024 1910 Reassessed patient.  Patient notes she still has a mild headache.  Patient is tolerating p.o.  Will ambulate patient.  Discussed leukocytosis.  Patient states her white count is always high and they do not know why. [JA]      ED Course User Index  [JA] Arvin Rice, DO         Diagnoses as of 06/26/24 2342   Hemodialysis-associated hypotension   Nonintractable headache, unspecified chronicity pattern, unspecified headache type   Nausea   Leukocytosis, unspecified type   Cutaneous abscess of groin       Medical Decision Making  39-year-old female presents emergency department from dialysis with report of dizziness/lightheadedness while getting dialysis and headache.  Patient was signed out to me by previous provider who reports the patient's workup is significant for leukocytosis and findings consistent with her end-stage renal disease.  He recommends reevaluation of the patient's symptoms and if she is feeling improved she can likely be discharged though if she is not improved she may require admission for further workup.  On my exam the patient reports she continues to have a headache.  She tells me that she has been getting chills since dialysis and also feeling lightheaded/dizzy.  I also appreciated patient has become mildly tachycardic and this along with her leukocytosis is concerning for possible sepsis.  Patient tells me that she has a right chest catheter for dialysis as well as a left arm fistula.  Given this history I am  concerned for possible blood-borne pathogen.  Patient also tells me that she has an abscess in her groin that she noticed yesterday that has started draining.  On my exam this does appear to be limited to the skin and is actively draining.  It is about 1 cm in diameter.  There is no surrounding crepitance, pain is not out of proportion, and I not appreciate any area of fluctuance.  Patient tells me she gets these skin boils frequently.  Patient was given vancomycin at dialysis and therefore she is given Zosyn here for coverage of source unclear.  IV fluids were given cautiously by previous providers due to her end-stage renal disease.  Blood cultures are obtained.  I discussed with patient concern for possible infection and recommended admission given that her symptoms or not improving.  She is agreeable with this plan.  Case is discussed with hospitalist on-call.  Concern for sepsis was recognized on my evaluation of the patient at about 8:30 PM.    On my exam of the patient she is alert and interactive.  Heart is regular but tachycardic.  Lungs are coarse bilaterally.  Right chest catheter is in place without surrounding erythema or warmth.  Left arm fistula is with good thrill.  Abdomen is benign without rebound, rigidity, guarding, or distention.  Patient does not have any meningismus signs.  She has no focal neurologic deficit on my exam.  She is nontoxic on my exam.  Patient is alert and interactive answering questions appropriately.        Final diagnoses:   [I95.3] Hemodialysis-associated hypotension   [R51.9] Nonintractable headache, unspecified chronicity pattern, unspecified headache type   [R11.0] Nausea   [D72.829] Leukocytosis, unspecified type   [L02.214] Cutaneous abscess of groin           Procedure  Procedures    Taz Odom DO

## 2024-06-27 NOTE — CONSULTS
Inpatient consult to Acute Care Surgery  Consult performed by: Chelsea Delgadillo PA-C  Consult ordered by: Heidi Lucas MD  Reason for consult: Left groin abscess      General Surgery Consult Note  Patient: Alexus Haley  Unit/Bed: 907/907-B  YOB: 1985  MRN: [id number]  Acct: [id number]   Admitting Diagnosis: Dizziness [R42]  Nausea [R11.0]  Hemodialysis-associated hypotension [I95.3]  Cutaneous abscess of groin [L02.214]  Nonintractable headache, unspecified chronicity pattern, unspecified headache type [R51.9]  Leukocytosis, unspecified type [D72.829]  Date:  6/26/2024  Hospital Day: 0  Attending: Heidi Lucas MD    Complaint:  Chief Complaint   Patient presents with    Dizziness      History of Present Illness:  39-year-old female with history of ESRD on dialysis, asthma, hyperlipidemia, hypertension, diabetes, PE on Eliquis presented to the ER yesterday from dialysis with dizziness and headache.  WBC 15.5, but chart review showed it has been chronically elevated as high as 14.  She also has an abscess in her left groin that is draining purulent discharge.  She had a CT of her head that was negative for acute process.  She was admitted for dizziness and leukocytosis and was started on vancomycin and Zosyn.  Today, general surgery was consulted for the abscess.  The patient states she has had pain to the left groin at the location of the abscess since last night, and overnight it burst open and started draining.  She states she has history of abscess to the groin.  Allergies:  Allergies   Allergen Reactions    Gabapentin Angioedema, Anaphylaxis, Hives, Itching, Nausea And Vomiting, Rash, Swelling, Unknown and Wheezing     Itchy and swollen throat    Mushroom Hives, Itching, Swelling and Wheezing    Strawberry Hives, Itching and Swelling    Latex      Added based on information entered during case entry, please review and add reactions, type, and severity as needed     Oxycodone-Acetaminophen Angioedema and Rash      PMHx:  Past Medical History:   Diagnosis Date    Asthma ([facility])     Chronic kidney disease     Diabetes mellitus (Multi)     Hyperlipemia     Hypertension      PSHx:  Past Surgical History:   Procedure Laterality Date    DIALYSIS FISTULA CREATION Left     also has a port in the right chest    TONSILLECTOMY Bilateral      Social Hx:  Social History     Socioeconomic History    Marital status: Single     Spouse name: Not on file    Number of children: Not on file    Years of education: Not on file    Highest education level: Not on file   Occupational History    Not on file   Tobacco Use    Smoking status: Former     Current packs/day: 0.00     Types: Cigarettes    Smokeless tobacco: Never   Vaping Use    Vaping status: Never Used   Substance and Sexual Activity    Alcohol use: Yes     Comment: rarely    Drug use: Never    Sexual activity: Not Currently     Partners: Female     Birth control/protection: I.U.D.   Other Topics Concern    Not on file   Social History Narrative    Not on file     Social Determinants of Health     Financial Resource Strain: Low Risk  (12/29/2023)    Overall Financial Resource Strain (CARDIA)     Difficulty of Paying Living Expenses: Not hard at all   Food Insecurity: Food Insecurity Present (10/20/2023)    Hunger Vital Sign     Worried About Running Out of Food in the Last Year: Sometimes true     Ran Out of Food in the Last Year: Often true   Transportation Needs: No Transportation Needs (12/29/2023)    PRAPARE - Transportation     Lack of Transportation (Medical): No     Lack of Transportation (Non-Medical): No   Physical Activity: Not on file   Stress: Not on file   Social Connections: Not on file   Intimate Partner Violence: Not on file   Housing Stability: High Risk (12/29/2023)    Housing Stability Vital Sign     Unable to Pay for Housing in the Last Year: No     Number of Places Lived in the Last Year: 2     Unstable Housing in the  Last Year: Yes     Family Hx:  No family history on file.  Review of Systems:   Review of Systems   Constitutional: Negative.    HENT: Negative.     Eyes: Negative.    Respiratory: Negative.     Cardiovascular: Negative.    Gastrointestinal: Negative.    Genitourinary: Negative.    Musculoskeletal:  Positive for myalgias (left groin).   Skin:  Positive for wound (abscess).     Physical Examination:    Visit Vitals  /64 (BP Location: Right arm, Patient Position: 3 minute standing)   Pulse 105   Temp 36.4 °C (97.5 °F) (Temporal)   Resp 16      Physical Exam  Constitutional:       General: She is not in acute distress.     Appearance: She is obese.   HENT:      Head: Normocephalic and atraumatic.      Mouth/Throat:      Mouth: Mucous membranes are moist.   Eyes:      Conjunctiva/sclera: Conjunctivae normal.   Pulmonary:      Effort: Pulmonary effort is normal. No respiratory distress.   Genitourinary:     Comments: Small area of erythema and fluctuance to left groin.  It is draining purulent discharge.  Exam chaperoned by Dr. Mujica.  Skin:     General: Skin is warm and dry.   Neurological:      Mental Status: She is alert.   Psychiatric:         Mood and Affect: Mood normal.         Behavior: Behavior normal.       LABS:  CBC:   Lab Results   Component Value Date    WBC 10.8 06/27/2024    RBC 3.16 (L) 06/27/2024    HGB 9.0 (L) 06/27/2024    HCT 29.0 (L) 06/27/2024    MCV 92 06/27/2024    MCH 28.5 06/27/2024    MCHC 31.0 (L) 06/27/2024    RDW 13.3 06/27/2024     (L) 06/27/2024     CBC with Differential:    Lab Results   Component Value Date    WBC 10.8 06/27/2024    RBC 3.16 (L) 06/27/2024    HGB 9.0 (L) 06/27/2024    HCT 29.0 (L) 06/27/2024     (L) 06/27/2024    MCV 92 06/27/2024    MCH 28.5 06/27/2024    MCHC 31.0 (L) 06/27/2024    RDW 13.3 06/27/2024    NRBC 0.0 06/27/2024    LYMPHOPCT 5.4 06/26/2024    MONOPCT 2.5 06/26/2024    EOSPCT 0.2 06/26/2024    BASOPCT 0.2 06/26/2024    MONOSABS 0.39  "06/26/2024    LYMPHSABS 0.84 (L) 06/26/2024    EOSABS 0.03 06/26/2024    BASOSABS 0.03 06/26/2024     CMP:    Lab Results   Component Value Date     06/27/2024    K 4.5 06/27/2024    CL 95 (L) 06/27/2024    CO2 25 06/27/2024    BUN 39 (H) 06/27/2024    CREATININE 6.85 (H) 06/27/2024    GLUCOSE 131 (H) 06/27/2024    PROT 6.7 06/26/2024    CALCIUM 6.9 (L) 06/27/2024    BILITOT 0.5 06/26/2024    ALKPHOS 90 06/26/2024    AST 11 06/26/2024    ALT 7 06/26/2024     BMP:    Lab Results   Component Value Date     06/27/2024    K 4.5 06/27/2024    CL 95 (L) 06/27/2024    CO2 25 06/27/2024    BUN 39 (H) 06/27/2024    CREATININE 6.85 (H) 06/27/2024    CALCIUM 6.9 (L) 06/27/2024    GLUCOSE 131 (H) 06/27/2024     Magnesium:No results found for: \"MG\"  Troponin:    Lab Results   Component Value Date    TROPHS 13 06/26/2024     Lipid Panel:  No results found for: \"HDL\", \"CHHDL\", \"VLDL\", \"TRIG\", \"NHDL\"   Current Medications:    Current Facility-Administered Medications:     acetaminophen (Tylenol) tablet 650 mg, 650 mg, oral, q4h PRN, 650 mg at 06/27/24 0608 **OR** acetaminophen (Tylenol) oral liquid 650 mg, 650 mg, oral, q4h PRN **OR** acetaminophen (Tylenol) suppository 650 mg, 650 mg, rectal, q4h PRN, Darlene Holm, APRN-CNP    acetaminophen (Tylenol) tablet 975 mg, 975 mg, oral, Once, Taz Odom DO    albuterol 2.5 mg /3 mL (0.083 %) nebulizer solution 2.5 mg, 2.5 mg, nebulization, q6h PRN, Heidi Lucas MD    albuterol 90 mcg/actuation inhaler 2 puff, 2 puff, inhalation, 4x daily PRN, Heidi Lucas MD    apixaban (Eliquis) tablet 5 mg, 5 mg, oral, BID, Darlene Holm, APRN-CNP, 5 mg at 06/27/24 0858    atorvastatin (Lipitor) tablet 20 mg, 20 mg, oral, Daily, Heidi Lucas MD    calcium carbonate (Tums) chewable tablet 1,000 mg, 1,000 mg, oral, Daily, Heidi Lucas MD    cyclobenzaprine (Flexeril) tablet 10 mg, 10 mg, oral, Daily, Heidi Lucas MD    dextrose 50 % " injection 12.5 g, 12.5 g, intravenous, q15 min PRN, SHAHRIAR Brandt    dextrose 50 % injection 25 g, 25 g, intravenous, q15 min PRN, SHAHRIAR Brandt    [START ON 6/30/2024] ergocalciferol (Vitamin D-2) capsule 1,250 mcg, 1,250 mcg, oral, Every Sunday, Heidi Lucas MD    [START ON 6/28/2024] ferrous sulfate (325 mg ferrous sulfate) tablet 1 tablet, 1 tablet, oral, Every other day, Heidi Lucas MD    glucagon (Glucagen) injection 1 mg, 1 mg, intramuscular, q15 min PRN, SHAHRIAR Brandt    glucagon (Glucagen) injection 1 mg, 1 mg, intramuscular, q15 min PRN, SHAHRIAR Brandt    insulin glargine (Lantus) injection 40 Units, 40 Units, subcutaneous, Nightly, Heidi Lucas MD    insulin lispro (HumaLOG) injection 0-10 Units, 0-10 Units, subcutaneous, TID, SHAHRIAR Brandt, 2 Units at 06/27/24 1132    insulin lispro (HumaLOG) injection 8 Units, 8 Units, subcutaneous, TID, Heidi Lucas MD    lidocaine (Xylocaine) 10 mg/mL (1 %) injection 30 mL, 30 mL, infiltration, Once, Chelsea Delgadillo PA-C    meclizine (Antivert) tablet 25 mg, 25 mg, oral, TID PRN, SHAHRIAR Brandt    melatonin tablet 5 mg, 5 mg, oral, Nightly PRN, SHAHRIAR Brandt    metoprolol succinate XL (Toprol-XL) 24 hr tablet 50 mg, 50 mg, oral, Daily, Heidi Lucas MD    mometasone (Asmanex) 220 mcg/ actuation (14) inhaler 2 puff, 2 puff, inhalation, BID, Heidi Lucas MD    montelukast (Singulair) tablet 10 mg, 10 mg, oral, Daily, Heidi Lucas MD    ondansetron ODT (Zofran-ODT) disintegrating tablet 4 mg, 4 mg, oral, q8h PRN **OR** ondansetron (Zofran) injection 4 mg, 4 mg, intravenous, q8h PRN, Darlene Holm, APRN-CNP, 4 mg at 06/27/24 0315    pantoprazole (ProtoNix) EC tablet 40 mg, 40 mg, oral, Daily, 40 mg at 06/27/24 0609 **OR** pantoprazole (ProtoNix) injection 40 mg, 40 mg, intravenous, Daily, Darlene Holm,  AHMET-CNP    piperacillin-tazobactam-dextrose (Zosyn) IV 3.375 g, 3.375 g, intravenous, q12h, Stopped at 06/27/24 0700 **AND** sodium chloride 0.9% infusion, 10 mL/hr, intravenous, q12h, SHAHRIAR Brandt, Last Rate: 10 mL/hr at 06/27/24 0300, 10 mL/hr at 06/27/24 0300    polyethylene glycol (Glycolax, Miralax) packet 17 g, 17 g, oral, Daily, SHAHRIAR Brandt    sevelamer carbonate (Renvela) tablet 800 mg, 800 mg, oral, TID, Heidi Lucas MD    traMADol (Ultram) tablet 25 mg, 25 mg, oral, q12h PRN, AHMET Brandt-CNP, 25 mg at 06/27/24 0859    vancomycin (Vancocin) pharmacy to dose - pharmacy monitoring, , miscellaneous, Daily PRN, SHAHRIAR Brandt  peripheral IV bedside imaging    Result Date: 6/27/2024  These images are not reportable by radiology and will not be interpreted by  Radiologists.    CT head wo IV contrast    Result Date: 6/26/2024  Interpreted By:  Alex Osborn, STUDY: CT HEAD WO IV CONTRAST;  6/26/2024 6:07 pm   INDICATION: Signs/Symptoms:headache, nausea.   COMPARISON: None.   ACCESSION NUMBER(S): [id number]   ORDERING CLINICIAN: RADHA NOEL   TECHNIQUE: Noncontrast axial CT scan of head was performed. Angled reformats in brain and bone windows were generated. The images were reviewed in bone, brain, blood and soft tissue windows.   FINDINGS: CSF Spaces: The ventricles, sulci and basal cisterns are within normal limits. There is no extraaxial fluid collection.   Parenchyma:  The grey-white differentiation is intact. There is no mass effect or midline shift.  There is no intracranial hemorrhage.   Calvarium: The calvarium is unremarkable.   Paranasal sinuses and mastoids: Visualized paranasal sinuses and mastoids are clear.       No evidence of acute cortical infarct or intracranial hemorrhage.       MACRO: None       Signed by: Alex Osborn 6/26/2024 6:31 PM Dictation workstation:   [id number]    ECG 12 lead    Result Date:  6/26/2024  Sinus tachycardia Otherwise normal ECG When compared with ECG of 10-MAY-2024 10:04, Premature atrial complexes are no longer Present          Assessment:    Left groin abscess    39-year-old female with history of ESRD on dialysis, diabetes, PE on Eliquis admitted for dizziness, leukocytosis, also with recurrent left groin abscess.  On exam, there is a small area of erythema and fluctuance to the left groin that is draining purulent discharge.  She is afebrile.  WBC 10.8.  Risks and benefits were discussed and it was decided to proceed with an incision and drainage of the abscess.  See separate procedure note.  She tolerated the procedure but had some pain, there were no complications.  The incision site was packed with gauze.      Plan:  -Regular diet  -IV abx  -Pain control  -Wound nurse consult  -Daily NS wet to dry packing and gauze dressing  -Wound culture obtained  -DVT prophylaxis-Eliquis  -Continue care per primary team   -Will recheck tomorrow if patient is still admitted. Follow up with Dr. Mujica [facility] in 2 weeks.    Further recommendations per Dr. Mujica     Time spent  60  minutes obtaining labs, imaging, recommendations, interview, assessment, examination, medication review/ordering, and EMR review.    Plan of care was discussed extensively with patient. Patient verbalized understanding through teach back method. All questions and concerns addressed upon examination.     Of note, this documentation is completed using the Dragon Dictation system (voice recognition software). There may be spelling and/or grammatical errors that were not corrected prior to final submission.    Electronically signed by Chelsea Delgadillo PA-C on 6/27/2024 at 3:00 PM

## 2024-06-27 NOTE — CONSULTS
[facility] Nephrology  Consult Note           Reason for Consult:  catheter related bacteremia, esrd,  Requesting Physician:  Dr. Lucas    Chief Complaint:  dizziness, headache  History Obtained From:  patient, electronic medical record    History of Present Ilness:    39 y.o. year old female admitted with dizziness and headache.  She has ESRD on HD with Dr. Mclaughlin at [facility].  Has right sided permcath and left avg.  Have used the left avg few times.  Wbc at 14.  Found to have abscess in left groin draining.  Underwent I and D by surgery.  Blood cx from HD growing out GNB.  Cause of ESRD is DM, HTN.     Past Medical History:    Past Medical History:   Diagnosis Date    Asthma ([facility]-HCC)     Chronic kidney disease     Diabetes mellitus (Multi)     Hyperlipemia     Hypertension         Past Surgical History:    Past Surgical History:   Procedure Laterality Date    DIALYSIS FISTULA CREATION Left     also has a port in the right chest    TONSILLECTOMY Bilateral         Home Medications:    No current facility-administered medications on file prior to encounter.     Current Outpatient Medications on File Prior to Encounter   Medication Sig Dispense Refill    albuterol 0.63 mg/3 mL nebulizer solution USE ONE VIAL IN NEBULIZER EVERY 6 HOURS AS NEEDED      apixaban (Eliquis) 5 mg tablet Take 1 tablet (5 mg) by mouth 2 times a day. 60 tablet 2    atorvastatin (Lipitor) 20 mg tablet Take 1 tablet (20 mg) by mouth once daily. as directed      cyclobenzaprine (Flexeril) 10 mg tablet Take 1 tablet (10 mg) by mouth once daily.      ergocalciferol (Vitamin D-2) 1.25 MG (91620 UT) capsule Take 1 capsule (1,250 mcg) by mouth 1 (one) time per week.      ferrous sulfate 325 (65 Fe) MG tablet Take 1 tablet by mouth every other day.      Flovent HFA 44 mcg/actuation inhaler Inhale 2 puffs 2 times a day.      Lantus Solostar U-100 Insulin 100 unit/mL (3 mL) pen INJECT 42 UNITS SUBCUTANEOUSLY TWICE A DAY      metoprolol succinate XL  "(Toprol-XL) 50 mg 24 hr tablet Take 1 tablet (50 mg) by mouth once daily. Do not crush or chew.      montelukast (Singulair) 10 mg tablet Take 1 tablet (10 mg) by mouth once daily.      NovoLOG FlexPen U-100 Insulin 100 unit/mL (3 mL) pen INJECT 5 UNITS SUBCUTANEOUSLY WITH BREAKFAST, 10 UNITS WITH LUNCH, AND 20 UNITS WITH DINNER      sevelamer carbonate (Renvela) 800 mg tablet Take 1 tablet (800 mg) by mouth 3 times daily (morning, midday, late afternoon). Swallow tablet whole; do not crush, break, or chew.      UltiCare Pen Needle 31 gauge x 1/4\" needle TEST 5 TIMES A DAY      Alcohol Prep Pads pads, medicated USE 3 PADS 3 TIMES A DAY      Ventolin HFA 90 mcg/actuation inhaler Inhale 2 puffs 4 times a day as needed.      [DISCONTINUED] amLODIPine (Norvasc) 5 mg tablet Take 1 tablet (5 mg) by mouth once daily.         Allergies:  Gabapentin, Mushroom, Strawberry, Latex, and Oxycodone-acetaminophen    Social History:    Social History     Socioeconomic History    Marital status: Single     Spouse name: Not on file    Number of children: Not on file    Years of education: Not on file    Highest education level: Not on file   Occupational History    Not on file   Tobacco Use    Smoking status: Former     Current packs/day: 0.00     Types: Cigarettes    Smokeless tobacco: Never   Vaping Use    Vaping status: Never Used   Substance and Sexual Activity    Alcohol use: Yes     Comment: rarely    Drug use: Never    Sexual activity: Not Currently     Partners: Female     Birth control/protection: I.U.D.   Other Topics Concern    Not on file   Social History Narrative    Not on file     Social Determinants of Health     Financial Resource Strain: Low Risk  (12/29/2023)    Overall Financial Resource Strain (CARDIA)     Difficulty of Paying Living Expenses: Not hard at all   Food Insecurity: Food Insecurity Present (10/20/2023)    Hunger Vital Sign     Worried About Running Out of Food in the Last Year: Sometimes true     Ran " "Out of Food in the Last Year: Often true   Transportation Needs: No Transportation Needs (12/29/2023)    PRAPARE - Transportation     Lack of Transportation (Medical): No     Lack of Transportation (Non-Medical): No   Physical Activity: Not on file   Stress: Not on file   Social Connections: Not on file   Intimate Partner Violence: Not on file   Housing Stability: High Risk (12/29/2023)    Housing Stability Vital Sign     Unable to Pay for Housing in the Last Year: No     Number of Places Lived in the Last Year: 2     Unstable Housing in the Last Year: Yes       Family History:   No family history on file.    Review of Systems:   Review of Systems   Constitutional:  Negative for activity change.   HENT:  Negative for congestion.    Eyes:  Negative for discharge.   Respiratory:  Negative for shortness of breath.    Cardiovascular:  Negative for leg swelling.   Gastrointestinal:  Negative for abdominal distention.   Endocrine: Negative for cold intolerance.   Genitourinary:  Positive for difficulty urinating.   Musculoskeletal:  Negative for arthralgias.   Neurological:  Positive for dizziness and headaches.   Hematological:  Negative for adenopathy.   Psychiatric/Behavioral:  Negative for agitation.          Physical exam:   Constitutional:  VITALS:  /64 (BP Location: Right arm, Patient Position: 3 minute standing)   Pulse 105   Temp 36.4 °C (97.5 °F) (Temporal)   Resp 16   Ht 1.702 m (5' 7\")   Wt 128 kg (281 lb 15.5 oz)   SpO2 98%   BMI 44.16 kg/m²      Wt Readings from Last 3 Encounters:   06/26/24 128 kg (281 lb 15.5 oz)   06/27/24 124 kg (272 lb 12.8 oz)   06/11/24 127 kg (280 lb 12.8 oz)      Gen: alert, awake, nad  Head: atraumatic, normocephalic  Skin: no rash, turgor wnl  Heent:  eomi, mmm  Neck: no bruits or jvd noted, thyroid normal  Lungs:  clear to auscultation  Heart:  regular rate and rhythm, no murmurs  Abdomen:  +bs, soft, nt, nd, no hepatosplenomegaly   Extremities: no " "edema  Psychiatric: mood and affect appropriate; judgement and insight intact  Musculoskeletal:  Rom, muscular strength intact; digits, nails normal    Data/  CBC:   Lab Results   Component Value Date    WBC 10.8 06/27/2024    RBC 3.16 (L) 06/27/2024    HGB 9.0 (L) 06/27/2024    HCT 29.0 (L) 06/27/2024    MCV 92 06/27/2024    MCH 28.5 06/27/2024    MCHC 31.0 (L) 06/27/2024    RDW 13.3 06/27/2024     (L) 06/27/2024     BMP:    Lab Results   Component Value Date     06/27/2024    K 4.5 06/27/2024    CL 95 (L) 06/27/2024    CO2 25 06/27/2024    BUN 39 (H) 06/27/2024    CREATININE 6.85 (H) 06/27/2024    CALCIUM 6.9 (L) 06/27/2024    GLUCOSE 131 (H) 06/27/2024     peripheral IV bedside imaging  These images are not reportable by radiology and will not be interpreted   by  Radiologists.    LFT:   Lab Results   Component Value Date    AST 11 06/26/2024    ALT 7 06/26/2024    ALKPHOS 90 06/26/2024    BILITOT 0.5 06/26/2024      Urinalysis: No results found for: \"LAUREN\", \"PROTUR\", \"GLUCOSEU\", \"BLOODU\", \"KETONESU\", \"BILIRUBINU\", \"NITRITEU\", \"LEUKOCYTESU\", \"UTPCR\"   Imaging: peripheral IV bedside imaging  These images are not reportable by radiology and will not be interpreted   by  Radiologists.           Assessment/  39 y.o. yo female admitted with dizziness and headache.  Has ESRD on HD MWF.  [facility].  On HD since June 2023.  Acess left avg that was just starting to be used.  Has permcath on right.  Has groin abscess.  Anemic.  Blood cx from hd growing out GNB.         Plan/  HD tomorrow  Not sure if source of infection is groin or hd catheter.  Either way, she is bacteremic and will need permcath removed.   Use AVG for HD  Ok for vanco x 1 but can likely tailor abx to GNB once we get bug and sensitivies  Hold eliquis for permcath removal.  This is for hx of PE, but was over 6 months ago.  Can hold and resume after.  D/w Dr. Lucas  Outpatient follow up from renal standpoint: Dr. Mclaughlin    Thank " you for the consultation.  Please do not hesitate to call with questions.    Jose L Vega MD

## 2024-06-27 NOTE — PROGRESS NOTES
6/27/24  Called pt. Who is inpatient in the hospital. Cancelling nuclear stress test for AM and pt. Is aware for her reschedule, she needs to be done over two days. Will call to reschedule, EO secretarial aware to cancel.  James GARNER

## 2024-06-27 NOTE — H&P
History Of Present Illness  Alexus Haley is a 39 y.o. female with past medical history of ESRD on dialysis, asthma, HLD, and hypertension presented to the ER from dialysis with dizziness and headache. She did vomit during dialysis. EMS reported that her BP was low en route with systolic pressures in the 90s. Patient denies chest pain, shortness of breath, nausea, vomiting, diarrhea, fever, and chills.     ER course: vital signs are temp 36.8, heart rate 105, respiratory rate 18, /64, SPO2 99% on room air. Lab work shows sodium 134, creatinine 5.45, WBC 15.5, hgb 10.1. Per chart review patient WBCs has been chronically elevated as high as 14. She has been given Vanco prior to dialysis in the past and was given a dose today. She has an abscess in her left groin that is draining purulent drainage, otherwise there is no clear sign for her elevated white blood cell count. She was given a dose of Zosyn in the ER and blood cultures were obtained. She will be admitted for further evaluation and treatment.     Past Medical History  She has a past medical history of Asthma ([facility]), Chronic kidney disease, Diabetes mellitus (Multi), Hyperlipemia, and Hypertension.    Surgical History  She has a past surgical history that includes Dialysis fistula creation (Left) and Tonsillectomy (Bilateral).     Social History  She reports that she has quit smoking. Her smoking use included cigarettes. She has never used smokeless tobacco. She reports current alcohol use. She reports that she does not use drugs.    Family History  No family history on file.     Allergies  Gabapentin, Mushroom, Strawberry, Latex, and Oxycodone-acetaminophen    Review of Systems   Neurological:  Positive for dizziness and headaches.   All other systems reviewed and are negative.       Physical Exam  Constitutional:       General: She is not in acute distress.     Appearance: She is obese. She is ill-appearing.   HENT:      Mouth/Throat:      Mouth:  Mucous membranes are moist.   Eyes:      Pupils: Pupils are equal, round, and reactive to light.   Cardiovascular:      Rate and Rhythm: Normal rate and regular rhythm.      Pulses: Normal pulses.      Heart sounds: Normal heart sounds.   Pulmonary:      Effort: Pulmonary effort is normal.      Breath sounds: Normal breath sounds.   Abdominal:      General: Abdomen is flat. Bowel sounds are normal. There is no distension.      Palpations: Abdomen is soft.      Tenderness: There is no abdominal tenderness.   Musculoskeletal:         General: Normal range of motion.   Skin:     General: Skin is warm and dry.      Capillary Refill: Capillary refill takes less than 2 seconds.          Neurological:      Mental Status: She is alert and oriented to person, place, and time.          Last Recorded Vitals  /64 (BP Location: Right arm, Patient Position: Lying)   Pulse 98   Temp 36.8 °C (98.2 °F) (Temporal)   Resp 17   Wt 102 kg (225 lb)   SpO2 99%     Relevant Results  CBC:   Results from last 7 days   Lab Units 06/26/24  1300   WBC AUTO x10*3/uL 15.5*   RBC AUTO x10*6/uL 3.54*   HEMOGLOBIN g/dL 10.1*   HEMATOCRIT % 32.3*   MCV fL 91   MCH pg 28.5   MCHC g/dL 31.3*   RDW % 13.2   PLATELETS AUTO x10*3/uL 130*     CMP:    Results from last 7 days   Lab Units 06/26/24  1300   SODIUM mmol/L 134*   POTASSIUM mmol/L 3.5   CHLORIDE mmol/L 98   CO2 mmol/L 29   BUN mg/dL 30*   CREATININE mg/dL 5.45*   GLUCOSE mg/dL 122*   PROTEIN TOTAL g/dL 6.7   CALCIUM mg/dL 7.4*   BILIRUBIN TOTAL mg/dL 0.5   ALK PHOS U/L 90   AST U/L 11   ALT U/L 7     BMP:    Results from last 7 days   Lab Units 06/26/24  1300   SODIUM mmol/L 134*   POTASSIUM mmol/L 3.5   CHLORIDE mmol/L 98   CO2 mmol/L 29   BUN mg/dL 30*   CREATININE mg/dL 5.45*   CALCIUM mg/dL 7.4*   GLUCOSE mg/dL 122*     Magnesium:    Troponin:    Results from last 7 days   Lab Units 06/26/24  1300   TROPHS ng/L 13     Imagining  CT head wo IV contrast  Narrative: Interpreted By:   Alex Osborn,   STUDY:  CT HEAD WO IV CONTRAST;  6/26/2024 6:07 pm      INDICATION:  Signs/Symptoms:headache, nausea.      COMPARISON:  None.      ACCESSION NUMBER(S):  [id number]      ORDERING CLINICIAN:  RADHA NOEL      TECHNIQUE:  Noncontrast axial CT scan of head was performed. Angled reformats in  brain and bone windows were generated. The images were reviewed in  bone, brain, blood and soft tissue windows.      FINDINGS:  CSF Spaces: The ventricles, sulci and basal cisterns are within  normal limits. There is no extraaxial fluid collection.      Parenchyma:  The grey-white differentiation is intact. There is no  mass effect or midline shift.  There is no intracranial hemorrhage.      Calvarium: The calvarium is unremarkable.      Paranasal sinuses and mastoids: Visualized paranasal sinuses and  mastoids are clear.      Impression: No evidence of acute cortical infarct or intracranial hemorrhage.              MACRO:  None              Signed by: Alex Osborn 6/26/2024 6:31 PM  Dictation workstation:   [id number]  ECG 12 lead  Sinus tachycardia  Otherwise normal ECG  When compared with ECG of 10-MAY-2024 10:04,  Premature atrial complexes are no longer Present       Assessment/Plan   Active Problems:  There are no active Hospital Problems.    Dizziness  Leukocytosis  ESRD on dialysis  -Presented to the ER with dizziness after dialysis. CT head negative. WBC 15. Afebrile and hemodynamically stable.  -Continue Vanc and Zosyn  -Meclizine as needed for dizziness  -Regular diet  -Blood cultures pending  -Monitor groin abscess  -Consult nephrology for dialysis    Diabetes Mellitus  -SSI, Accuchecks, hypoglycemic protocol    Pulmonary Embolism  -Resume Eliquis    Hypertension/HLD  -Resume home meds    DVTp: heparin subcutaneous     PLAN: TCC for discharge planning    AHMET Purcell-CNP    Plan of care was discussed extensively with patient.  Patient verbalized understanding through teach back  method.  All question and concerns addressed upon examination.    Of note, this documentation is completed using the Dragon Dictation system (voice recognition software). There may be spelling and/or grammatical errors that were not corrected prior to final submission.

## 2024-06-27 NOTE — CONSULTS
Vancomycin Dosing by Pharmacy- INITIAL    Alexus Haley is a 39 y.o. year old female who Pharmacy has been consulted for vancomycin dosing for cellulitis, skin and soft tissue. Based on the patient's indication and renal status this patient will be dosed based on a goal trough/random level of 10-15.     Renal function is currently declining, patient is ESRD, on dialysis.    Visit Vitals  /60   Pulse 108   Temp 37.9 °C (100.2 °F) Comment: patient has refused tylenol multiple times, patient is agreeable at this time to take tylenol   Resp 17        Lab Results   Component Value Date    CREATININE 5.45 (H) 2024    CREATININE 6.34 (H) 05/10/2024    CREATININE 7.21 (H) 2024    CREATININE 8.78 (H) 2023        Patient weight is as follows:   Vitals:    24 2256   Weight: 128 kg (281 lb 15.5 oz)       Cultures:  No results found for the encounter in last 14 days.        No intake/output data recorded.  I/O during current shift:  I/O this shift:  In: 120 [P.O.:120]  Out: -     Temp (24hrs), Av.5 °C (99.5 °F), Min:36.8 °C (98.2 °F), Max:37.9 °C (100.2 °F)         Assessment/Plan     Patient will be given a loading dose of 2000 mg.  Will initiate vancomycin maintenance, based on levels prior to dialysis.    Follow-up level will be ordered prior to dialysis unless clinically indicated sooner.  Will continue to monitor renal function daily while on vancomycin and order serum creatinine at least every 48 hours if not already ordered.  Follow for continued vancomycin needs, clinical response, and signs/symptoms of toxicity.       Hubert Wu, [facility]

## 2024-06-27 NOTE — PROGRESS NOTES
Alexus Haley is a 39 y.o. female on day 0 of admission presenting with Dizziness.      Subjective   Patient reports dizziness with standing up, has a left groin abscess that happens frequently  General surgery was consulted  Also complains of fever and chills and headache      Objective     Last Recorded Vitals  /60   Pulse 108   Temp 36.6 °C (97.9 °F)   Resp 17   Wt 128 kg (281 lb 15.5 oz)   SpO2 95%   Intake/Output last 3 Shifts:    Intake/Output Summary (Last 24 hours) at 6/27/2024 1037  Last data filed at 6/27/2024 0730  Gross per 24 hour   Intake 240 ml   Output --   Net 240 ml       Admission Weight  Weight: 102 kg (225 lb) (06/26/24 1223)    Daily Weight  06/26/24 : 128 kg (281 lb 15.5 oz)    Image Results  CT head wo IV contrast  Narrative: Interpreted By:  Alex Osborn,   STUDY:  CT HEAD WO IV CONTRAST;  6/26/2024 6:07 pm      INDICATION:  Signs/Symptoms:headache, nausea.      COMPARISON:  None.      ACCESSION NUMBER(S):  [id number]      ORDERING CLINICIAN:  RADHA NOEL      TECHNIQUE:  Noncontrast axial CT scan of head was performed. Angled reformats in  brain and bone windows were generated. The images were reviewed in  bone, brain, blood and soft tissue windows.      FINDINGS:  CSF Spaces: The ventricles, sulci and basal cisterns are within  normal limits. There is no extraaxial fluid collection.      Parenchyma:  The grey-white differentiation is intact. There is no  mass effect or midline shift.  There is no intracranial hemorrhage.      Calvarium: The calvarium is unremarkable.      Paranasal sinuses and mastoids: Visualized paranasal sinuses and  mastoids are clear.      Impression: No evidence of acute cortical infarct or intracranial hemorrhage.              MACRO:  None              Signed by: Alex Osborn 6/26/2024 6:31 PM  Dictation workstation:   [id number]  ECG 12 lead  Sinus tachycardia  Otherwise normal ECG  When compared with ECG of 10-MAY-2024 10:04,  Premature atrial  complexes are no longer Present      Physical Exam    General: Well-developed morbidly obese female, in no acute distress, looks ill  HEENT: AT, NC, no JVD, no lymphadenopathy, neck supple  Lungs: Clear, no wheezing, no crackles  Cardiac: Normal S1-S2, no murmur, no gallop  Abdomen: Soft, obese, nontender, no distention, positive bowel sound  Extremities: No deformity, no edema, pulses intact, ROM intact, left groin abscess noted  Neurological: Alert awake oriented x3, sensation intact, clear speech      Assessment/Plan      Principal Problem:    Augustine Haley is a 39 y.o. female with past medical history of ESRD on dialysis, asthma, HLD, and hypertension who was admitted for management of following issues:    Dizziness, gradually improving, headache  Leukocytosis resolved  ESRD on dialysis  Left groin abscess  -Presented to the ER with dizziness after dialysis. CT head negative.  -Continue Vanc and Zosyn for abscess   -General surgery consulted  -Will check orthostatic vitals  -Meclizine as needed for dizziness  -Regular diet  -Blood and tissue cultures pending  -Nephrology consulted for dialysis  -Monitor renal function, avoid nephrotoxic agent  -Pain management, antiemetic     Diabetes Mellitus  -SSI, Accuchecks, hypoglycemic protocol     Pulmonary Embolism  -Continue Eliquis     Hypertension/HLD  -Continue home meds    Hypocalcemia  -Was replaced, will monitor     VTE prophylaxis: Eliquis  Disposition: Home once hemodynamically stable    Heidi Lucas MD

## 2024-06-27 NOTE — DISCHARGE INSTRUCTIONS
Follow up with Dr. Mujica [facility] in 2 weeks. Call [phone] to schedule an appointment.    Call the office or go to the ER if:  You have a fever above 100.4  Cannot eat or drink  Have trouble breathing  Have chest pain or shortness of breath  Pain is uncontrolled

## 2024-06-27 NOTE — CONSULTS
Dr. Mujica in patients room and I/D of left groin abscess was performed. Moderate amount of bloody foul smelling drainage seen. Wound was packed with plain packing and DSD was applied.  Wound Care Consult     Visit Date: 6/27/2024      Patient Name: Alexus Haley         MRN: [id number]           YOB: 1985     Reason for Consult: Left Groin Abscess        Wound History: few days     Pertinent Labs:   Albumin   Date Value Ref Range Status   06/26/2024 3.5 3.4 - 5.0 g/dL Final       Wound Assessment:       Wound Team Summary Assessment:       Wound Team Plan: Continue with packing as ordered.     Russel Ford LPN  6/27/2024  2:25 PM

## 2024-06-27 NOTE — PROCEDURES
Procedure Name: Incision and drainage    Indication: Left groin abscess    The area measured about 2.5 cm horizontally. It was cleansed with chlorhexadine prep. 2 cc of 1% Lidocaine was injected with a 25 gauge needle in a wheal like fashion. Before injecting the Lidocaine, there was no aspiration of blood. A number 11 blade scalpel was used to incise a 1 cm horizontal incision. Approximately 10 cc of bloody foul-smelling fluid was drained. Forceps were used to explore the area. The wound was packed with gauze packing and covered with a 4x4 and reinforced with mesh underwear. The patient tolerated the procedure with some pain.    This procedure was performed by me with Dr. Mujica assisting.

## 2024-06-28 ENCOUNTER — APPOINTMENT (OUTPATIENT)
Dept: RADIOLOGY | Facility: CLINIC | Age: 39
End: 2024-06-28
Payer: COMMERCIAL

## 2024-06-28 ENCOUNTER — APPOINTMENT (OUTPATIENT)
Dept: DIALYSIS | Facility: HOSPITAL | Age: 39
End: 2024-06-28
Payer: COMMERCIAL

## 2024-06-28 ENCOUNTER — APPOINTMENT (OUTPATIENT)
Dept: RADIOLOGY | Facility: HOSPITAL | Age: 39
End: 2024-06-28
Payer: COMMERCIAL

## 2024-06-28 ENCOUNTER — APPOINTMENT (OUTPATIENT)
Dept: CARDIOLOGY | Facility: CLINIC | Age: 39
End: 2024-06-28
Payer: COMMERCIAL

## 2024-06-28 LAB
ANION GAP SERPL CALC-SCNC: 13 MMOL/L (ref 10–20)
BUN SERPL-MCNC: 48 MG/DL (ref 6–23)
CALCIUM SERPL-MCNC: 7.5 MG/DL (ref 8.6–10.3)
CHLORIDE SERPL-SCNC: 100 MMOL/L (ref 98–107)
CO2 SERPL-SCNC: 27 MMOL/L (ref 21–32)
CREAT SERPL-MCNC: 7.85 MG/DL (ref 0.5–1.05)
EGFRCR SERPLBLD CKD-EPI 2021: 6 ML/MIN/1.73M*2
ERYTHROCYTE [DISTWIDTH] IN BLOOD BY AUTOMATED COUNT: 13.4 % (ref 11.5–14.5)
GLUCOSE BLD MANUAL STRIP-MCNC: 110 MG/DL (ref 74–99)
GLUCOSE BLD MANUAL STRIP-MCNC: 126 MG/DL (ref 74–99)
GLUCOSE BLD MANUAL STRIP-MCNC: 129 MG/DL (ref 74–99)
GLUCOSE BLD MANUAL STRIP-MCNC: 179 MG/DL (ref 74–99)
GLUCOSE SERPL-MCNC: 108 MG/DL (ref 74–99)
HCT VFR BLD AUTO: 31 % (ref 36–46)
HGB BLD-MCNC: 9.3 G/DL (ref 12–16)
HOLD SPECIMEN: NORMAL
MCH RBC QN AUTO: 28.2 PG (ref 26–34)
MCHC RBC AUTO-ENTMCNC: 30 G/DL (ref 32–36)
MCV RBC AUTO: 94 FL (ref 80–100)
NRBC BLD-RTO: 0 /100 WBCS (ref 0–0)
PLATELET # BLD AUTO: 149 X10*3/UL (ref 150–450)
POTASSIUM SERPL-SCNC: 4.2 MMOL/L (ref 3.5–5.3)
RBC # BLD AUTO: 3.3 X10*6/UL (ref 4–5.2)
SODIUM SERPL-SCNC: 136 MMOL/L (ref 136–145)
WBC # BLD AUTO: 7 X10*3/UL (ref 4.4–11.3)

## 2024-06-28 PROCEDURE — 2500000004 HC RX 250 GENERAL PHARMACY W/ HCPCS (ALT 636 FOR OP/ED): Performed by: INTERNAL MEDICINE

## 2024-06-28 PROCEDURE — 36415 COLL VENOUS BLD VENIPUNCTURE: CPT

## 2024-06-28 PROCEDURE — 36589 REMOVAL TUNNELED CV CATH: CPT | Performed by: RADIOLOGY

## 2024-06-28 PROCEDURE — 85027 COMPLETE CBC AUTOMATED: CPT

## 2024-06-28 PROCEDURE — RXMED WILLOW AMBULATORY MEDICATION CHARGE

## 2024-06-28 PROCEDURE — 2500000005 HC RX 250 GENERAL PHARMACY W/O HCPCS: Performed by: RADIOLOGY

## 2024-06-28 PROCEDURE — 2500000004 HC RX 250 GENERAL PHARMACY W/ HCPCS (ALT 636 FOR OP/ED)

## 2024-06-28 PROCEDURE — 70551 MRI BRAIN STEM W/O DYE: CPT | Performed by: RADIOLOGY

## 2024-06-28 PROCEDURE — 99239 HOSP IP/OBS DSCHRG MGMT >30: CPT | Performed by: STUDENT IN AN ORGANIZED HEALTH CARE EDUCATION/TRAINING PROGRAM

## 2024-06-28 PROCEDURE — 1200000002 HC GENERAL ROOM WITH TELEMETRY DAILY

## 2024-06-28 PROCEDURE — 82947 ASSAY GLUCOSE BLOOD QUANT: CPT | Mod: 91

## 2024-06-28 PROCEDURE — 87070 CULTURE OTHR SPECIMN AEROBIC: CPT | Mod: ELYLAB | Performed by: INTERNAL MEDICINE

## 2024-06-28 PROCEDURE — 2500000002 HC RX 250 W HCPCS SELF ADMINISTERED DRUGS (ALT 637 FOR MEDICARE OP, ALT 636 FOR OP/ED): Performed by: STUDENT IN AN ORGANIZED HEALTH CARE EDUCATION/TRAINING PROGRAM

## 2024-06-28 PROCEDURE — 80048 BASIC METABOLIC PNL TOTAL CA: CPT

## 2024-06-28 PROCEDURE — 2500000001 HC RX 250 WO HCPCS SELF ADMINISTERED DRUGS (ALT 637 FOR MEDICARE OP): Performed by: STUDENT IN AN ORGANIZED HEALTH CARE EDUCATION/TRAINING PROGRAM

## 2024-06-28 PROCEDURE — 70551 MRI BRAIN STEM W/O DYE: CPT

## 2024-06-28 PROCEDURE — 2500000001 HC RX 250 WO HCPCS SELF ADMINISTERED DRUGS (ALT 637 FOR MEDICARE OP)

## 2024-06-28 PROCEDURE — 8010000001 HC DIALYSIS - HEMODIALYSIS PER DAY

## 2024-06-28 RX ORDER — CEPHALEXIN 500 MG/1
500 CAPSULE ORAL 2 TIMES DAILY
Qty: 14 CAPSULE | Refills: 0 | Status: SHIPPED | OUTPATIENT
Start: 2024-06-28 | End: 2024-07-06

## 2024-06-28 RX ORDER — MECLIZINE HYDROCHLORIDE 25 MG/1
25 TABLET ORAL 3 TIMES DAILY PRN
Qty: 30 TABLET | Refills: 0 | Status: SHIPPED | OUTPATIENT
Start: 2024-06-28 | End: 2024-06-28

## 2024-06-28 RX ORDER — LIDOCAINE HYDROCHLORIDE 20 MG/ML
INJECTION, SOLUTION EPIDURAL; INFILTRATION; INTRACAUDAL; PERINEURAL
Status: COMPLETED | OUTPATIENT
Start: 2024-06-28 | End: 2024-06-28

## 2024-06-28 RX ORDER — CEPHALEXIN 500 MG/1
500 CAPSULE ORAL 2 TIMES DAILY
Qty: 14 CAPSULE | Refills: 0 | Status: SHIPPED | OUTPATIENT
Start: 2024-06-28 | End: 2024-06-28

## 2024-06-28 RX ORDER — KETOROLAC TROMETHAMINE 30 MG/ML
30 INJECTION, SOLUTION INTRAMUSCULAR; INTRAVENOUS ONCE
Status: COMPLETED | OUTPATIENT
Start: 2024-06-28 | End: 2024-06-28

## 2024-06-28 RX ORDER — MECLIZINE HYDROCHLORIDE 25 MG/1
25 TABLET ORAL 3 TIMES DAILY PRN
Qty: 30 TABLET | Refills: 0 | Status: SHIPPED | OUTPATIENT
Start: 2024-06-28 | End: 2024-07-09

## 2024-06-28 ASSESSMENT — PAIN - FUNCTIONAL ASSESSMENT: PAIN_FUNCTIONAL_ASSESSMENT: NO/DENIES PAIN

## 2024-06-28 ASSESSMENT — PAIN SCALES - GENERAL
PAINLEVEL_OUTOF10: 0 - NO PAIN
PAINLEVEL_OUTOF10: 0 - NO PAIN

## 2024-06-28 NOTE — DISCHARGE SUMMARY
"Discharge Diagnosis  Dizziness, left groin abscess   ESRD on hemodialysis     Issues Requiring Follow-Up  Outpatient follow-up with nephrology and dialysis center for history of ESRD on hemodialysis  Outpatient follow-up with general surgery as needed  Outpatient follow-up with primary care as needed    Discharge Meds     Your medication list        START taking these medications        Instructions Last Dose Given Next Dose Due   cephalexin 500 mg capsule  Commonly known as: Keflex      Take 1 capsule (500 mg) by mouth 2 times a day for 7 days.       meclizine 25 mg tablet  Commonly known as: Antivert      Take 1 tablet (25 mg) by mouth 3 times a day as needed for dizziness.              CONTINUE taking these medications        Instructions Last Dose Given Next Dose Due   albuterol 0.63 mg/3 mL nebulizer solution           Ventolin HFA 90 mcg/actuation inhaler  Generic drug: albuterol           Alcohol Prep Pads pads, medicated  Generic drug: alcohol swabs           apixaban 5 mg tablet  Commonly known as: Eliquis      Take 1 tablet (5 mg) by mouth 2 times a day.       atorvastatin 20 mg tablet  Commonly known as: Lipitor           cyclobenzaprine 10 mg tablet  Commonly known as: Flexeril           ergocalciferol 1.25 MG (25516 UT) capsule  Commonly known as: Vitamin D-2           ferrous sulfate (325 mg ferrous sulfate) tablet           Flovent HFA 44 mcg/actuation inhaler  Generic drug: fluticasone           Lantus Solostar U-100 Insulin 100 unit/mL (3 mL) pen  Generic drug: insulin glargine           metoprolol succinate XL 50 mg 24 hr tablet  Commonly known as: Toprol-XL           montelukast 10 mg tablet  Commonly known as: Singulair           NovoLOG Flexpen U-100 Insulin 100 unit/mL (3 mL) pen  Generic drug: insulin aspart           sevelamer carbonate 800 mg tablet  Commonly known as: Renvela           UltiCare Pen Needle 31 gauge x 1/4\" needle  Generic drug: pen needle, diabetic                     Where " to Get Your Medications        These medications were sent to [id number] - [place], [place] - [address]  [address] [facility] , [place] [address]      Phone: [phone]   apixaban 5 mg tablet  cephalexin 500 mg capsule  meclizine 25 mg tablet         Test Results Pending At Discharge  Pending Labs       Order Current Status    Tissue/Wound Culture/Smear In process    Blood Culture Preliminary result    Blood Culture Preliminary result    Blood Culture Preliminary result    Blood Culture Preliminary result    Tissue/Wound Culture/Smear Preliminary result            Hospital Course  Alexus Haley is a 39 y.o. female with past medical history of ESRD on dialysis, asthma, HLD, and hypertension who was admitted for management of following issues:     Dizziness, gradually improving, headache  Leukocytosis resolved  ESRD on dialysis  Left groin abscess  -Presented to the ER with dizziness after dialysis. CT head negative.  -started on Vanc and Zosyn for abscess  -General surgery consulted, s/p I&D at bedside of the abscess, IV abx was switched to oral Keflex on discharge   -Meclizine as needed for dizziness was placed   -Blood and tissue cultures so far negative   -Nephrology consulted for dialysis, received dialysis here   -Rt upper chest permacath was removed   -Monitored renal function, avoided nephrotoxic agent  -she was also placed on Pain management, antiemetic     Diabetes Mellitus  -SSI, Accuchecks, hypoglycemic protocol, DM diet placed      Pulmonary Embolism  -Continued Eliquis however was held for permacath removal      Hypertension/HLD  -Continued home meds     Hypocalcemia  -Was replaced     VTE prophylaxis: was with Eliquis    Currently patient is hemodynamically stable and ready for discharge to home.  She will follow-up as outpatient with dialysis center and nephrology and PCP as needed.  She will be continued on Keflex for few more days for left groin abscess.   Nephrology has also  added an additional 3 doses of gentamicin with HD to ensure clearance of infection.   MRI was done which did not show any new or acute findings. She will be continued on the rest of her home medication.      Pertinent Physical Exam At Time of Discharge  Physical Exam  General: Well-developed morbidly obese female, in no acute distress  HEENT: AT, NC, no JVD, no lymphadenopathy, neck supple  Lungs: Clear, no wheezing, no crackles  Cardiac: Normal S1-S2, no murmur, no gallop  Abdomen: Soft, obese, nontender, no distention, positive bowel sound  Extremities: No deformity, no edema, pulses intact, ROM intact, left groin is wrapped, left arm dialysis cath intact   Neurological: Alert awake oriented x3, sensation intact, clear speech    Outpatient Follow-Up  Future Appointments   Date Time Provider Department Center   7/12/2024 12:30 PM ELY ECHO 1 [id number] [place]   7/17/2024 12:45 PM Altaf Zambrano MD [id number] [place]   8/1/2024  9:30 AM CHRIS Carrillo, CCC-A [id number] [place]   8/1/2024 10:15 AM Johnathon Bernal DO [id number] [place]   8/6/2024 11:00 AM Micaela Mistry RD [id number] [facility]   8/27/2024  2:00 PM Patricia Porter PsyD [id number] [facility]       Time spent 35 minutes   eHidi Lucas MD

## 2024-06-28 NOTE — DOCUMENTATION CLARIFICATION NOTE
"    PATIENT:               BREANNA CLARK  ACCT #:                  [id number]  MRN:                       [id number]  :                       1985  ADMIT DATE:       2024 12:19 PM  DISCH DATE:  RESPONDING PROVIDER #:        [id number]          PROVIDER RESPONSE TEXT:    Sepsis with acute hematological organ dysfunction of thrombocytopenia    CDI QUERY TEXT:    Clarification    Instruction:  Based on your assessment of the patient and the clinical information, please provide the requested documentation by clicking on the appropriate radio button and enter any additional information if prompted.    Question: Is there a diagnosis indicative of a patient meeting SIRS criteria and with organ dysfunction in the setting of Groin Abscess and Bacteremia infection    When answering this query, please exercise your independent professional judgment. The fact that a question is being asked, does not imply that any particular answer is desired or expected.    The patient's clinical indicators include:  Clinical Information: 38 yo female admitted with hypotension during hemodialysis, headache, and groin abscess.    Clinical Indicators:  Vitals: ( 1223) Temp-36.8 HR-111 RR-18 BP-102/52 SPO2-98 NC  Temp: 37.9 ( 0606)    WBC: 15.5 ( 1300), 10.8 ( 0529), 7.0 ( 0533)  Neutrophils: 14.07 ( 1300)  Platelets: 130 ( 1300), 129 ( 0529), 149 ( 0533)     ED Dr. Odom: \"patient has become mildly tachycardic and this along with her leukocytosis is concerning for possible sepsis\"     PN Dr. Vega: \"Not sure if source of infection is groin or hd catheter.  Either way, she is bacteremic and will need permcath removed.  Order placed for IR removal\"    Treatment: NS IV bolus 250 ml x2, Zosyn 4.5 g IV x1, Zosyn 3.375 g IV q 8 then q 12, NS IVF,  Vancomycin 2000 mg IV x1    Risk Factors: SIRS, thrombocytopenia  Options provided:  -- Sepsis with acute hematological organ dysfunction of " thrombocytopenia  -- Sepsis with acute multi-system organ dysfunction of thrombocytopenia and hypotension  -- Patient treated for Bacteremia without Sepsis  -- Other - I will add my own diagnosis  -- Refer to Clinical Documentation Reviewer    Query created by: Elisabeth Ambrose on 6/28/2024 12:28 PM      Electronically signed by:  NEHEMIAH TORRES MD 6/28/2024 1:19 PM

## 2024-06-28 NOTE — NURSING NOTE
Report to Receiving RN:    Report To: PASCUAL Navarro  Time Report Called: 1400  Hand-Off Communication: 4 liters off Pt Stable  Complications During Treatment: No  Ultrafiltration Treatment: Yes  Medications Administered During Dialysis: No  Blood Products Administered During Dialysis: No  Labs Sent During Dialysis: No  Heparin Drip Rate Changes: No,     Electronic Signatures: NICK VALLADARES    Last Updated: 1:57 PM by NICK VALLADARES

## 2024-06-28 NOTE — PROGRESS NOTES
06/28/24 1702   Discharge Planning   Living Arrangements Friends   Support Systems Friends/neighbors   Assistance Needed independent , drives, no medication barriers   Type of Residence Private residence  (house-1 floor)   Number of Stairs to Enter Residence 0   Number of Stairs Within Residence 0   Home or Post Acute Services Community services;Other (Comment)  ( Healthy at home program)   Patient expects to be discharged to: home w/  Healthy at home program   Does the patient need discharge transport arranged? Yes     Pt confirms demo's ins. and has no pcp. Reports she has an appt scheduled in August- unkn physician,, declines  provider list. Receives HD at [facility]. Had I&d of groin abscess. Reports she will and can do dressing changes. RN updated. Pt drives. Prefers home discharge and to do own wound care. Was seen by TCC yesterday and offered H@H. Pt agrees. I placed order. Pt has handout.

## 2024-06-28 NOTE — PROGRESS NOTES
Renal Progress Note    Assessment and Plan:    39 y.o. yo female admitted with dizziness and headache.  Has ESRD on HD MWF.  [facility] heritage Dr. Mclaughlin.  On HD since June 2023.  Acess left avg that was just starting to be used.  Has permcath on right.  Has groin abscess.  Anemic.  Blood cx from hd growing out GNB.           Plan/  HD today  Not sure if source of infection is groin or hd catheter.  Either way, she is bacteremic and will need permcath removed.  Order placed for IR removal  Use AVG for HD  Ok for vanco x 1 but can likely tailor abx to GNB once we get bug and sensitivies.  I checked with HD unit this am and not back yet  Hold eliquis for permcath removal.  This is for hx of PE, but was over 6 months ago.  Can hold and resume after.  D/w Dr. Lucas yesterday  Outpatient follow up from renal standpoint: Dr. Mclaughlin    Subjective:   Admit Date: 6/26/2024    Interval History: pt seen on hd.  No hd related complications.  Bp ok.  No fevers      Medications:   Scheduled Meds:acetaminophen, 975 mg, oral, Once  [Held by provider] apixaban, 5 mg, oral, BID  atorvastatin, 20 mg, oral, Daily  calcium carbonate, 1,000 mg, oral, Daily  cyclobenzaprine, 10 mg, oral, Daily  [START ON 6/30/2024] ergocalciferol, 1,250 mcg, oral, Every Sunday  ferrous sulfate (325 mg ferrous sulfate), 1 tablet, oral, Every other day  insulin glargine, 40 Units, subcutaneous, Nightly  insulin lispro, 0-10 Units, subcutaneous, TID  insulin lispro, 8 Units, subcutaneous, TID  lidocaine, 30 mL, infiltration, Once  metoprolol succinate XL, 50 mg, oral, Daily  mometasone, 2 puff, inhalation, BID  montelukast, 10 mg, oral, Daily  pantoprazole, 40 mg, oral, Daily   Or  pantoprazole, 40 mg, intravenous, Daily  piperacillin-tazobactam, 3.375 g, intravenous, q12h   And  sodium chloride 0.9%, 10 mL/hr, intravenous, q12h  polyethylene glycol, 17 g, oral, Daily  sevelamer carbonate, 800 mg, oral, TID      Continuous Infusions:     CBC:   Lab Results  "  Component Value Date    HGB 9.3 (L) 06/28/2024    HGB 9.0 (L) 06/27/2024    WBC 7.0 06/28/2024    WBC 10.8 06/27/2024     (L) 06/28/2024     (L) 06/27/2024      Anemia:  No results found for: \"FERRITIN\", \"IRON\", \"TIBC\"   BMP:    Lab Results   Component Value Date     06/28/2024     06/27/2024    K 4.2 06/28/2024    K 4.5 06/27/2024     06/28/2024    CL 95 (L) 06/27/2024    CO2 27 06/28/2024    CO2 25 06/27/2024    BUN 48 (H) 06/28/2024    BUN 39 (H) 06/27/2024    CREATININE 7.85 (H) 06/28/2024    CREATININE 6.85 (H) 06/27/2024      Bone disease: No results found for: \"PHOS\", \"PTH\", \"VITD25\"   Urinalysis:  No results found for: \"LAUREN\", \"PROTUR\", \"GLUCOSEU\", \"BLOODU\", \"KETONESU\", \"BILIRUBINU\", \"NITRITEU\", \"LEUKOCYTESU\", \"UTPCR\"     Objective:   Vitals: /65   Pulse 88   Temp 36.4 °C (97.5 °F) (Tympanic)   Resp 14   Ht 1.702 m (5' 7\")   Wt 128 kg (281 lb 15.5 oz)   SpO2 97%   BMI 44.16 kg/m²    Wt Readings from Last 3 Encounters:   06/26/24 128 kg (281 lb 15.5 oz)   06/27/24 124 kg (272 lb 12.8 oz)   06/11/24 127 kg (280 lb 12.8 oz)      24HR INTAKE/OUTPUT:    Intake/Output Summary (Last 24 hours) at 6/28/2024 1048  Last data filed at 6/28/2024 1008  Gross per 24 hour   Intake 821 ml   Output 400 ml   Net 421 ml     Admission weight:  Weight: 102 kg (225 lb)      Constitutional:  Alert, awake, no apparent distress   Skin:normal, no rash  HEENT:sclera anicteric.  Head atraumatic normocephalic  Neck:supple with no thyromegally  Cardiovascular:  S1, S2 without m/r/g   Respiratory:  CTA B without w/r/r   Abdomen: +bs, soft, nt  Ext: no LE edema  Musculoskeletal:Intact  Neuro:Alert and oriented with no deficit      Electronically signed by Jose L Vega MD on 6/28/2024 at 10:48 AM            "

## 2024-06-28 NOTE — PROGRESS NOTES
General Surgery Progress Note    Patient: Alexus Haley  Unit/Bed: 907/907-B  YOB: 1985  MRN: [id number]  Acct: [id number]   Admitting Diagnosis: Dizziness [R42]  Nausea [R11.0]  Hemodialysis-associated hypotension [I95.3]  Cutaneous abscess of groin [L02.214]  Nonintractable headache, unspecified chronicity pattern, unspecified headache type [R51.9]  Leukocytosis, unspecified type [D72.829]  Date:  6/26/2024  Hospital Day: 1  Attending: Heidi Lucas MD    Complaint:  Chief Complaint   Patient presents with    Dizziness      Subjective  Patient seen and examined this morning. No acute events overnight.    PHYSICAL EXAM:  Physical Exam  Vitals reviewed.   Constitutional:       Appearance: Normal appearance. She is obese.   HENT:      Head: Normocephalic.      Nose: Nose normal.      Mouth/Throat:      Mouth: Mucous membranes are moist.   Cardiovascular:      Rate and Rhythm: Normal rate.   Pulmonary:      Effort: Pulmonary effort is normal.   Abdominal:      General: Abdomen is flat. Bowel sounds are normal.      Palpations: Abdomen is soft.   Genitourinary:     Comments: Incision to left groin with purulent drainage  Skin:     General: Skin is warm.      Capillary Refill: Capillary refill takes less than 2 seconds.   Neurological:      General: No focal deficit present.      Mental Status: She is alert and oriented to person, place, and time.   Psychiatric:         Mood and Affect: Mood normal.       Vital signs in last 24 hours:  Vitals:    06/28/24 0042   BP: 106/57   Pulse: 86   Resp: 17   Temp: 35.5 °C (95.9 °F)   SpO2: 98%     Intake/Output this shift:    Intake/Output Summary (Last 24 hours) at 6/28/2024 0808  Last data filed at 6/28/2024 0606  Gross per 24 hour   Intake 421 ml   Output 400 ml   Net 21 ml      Allergies:  Allergies   Allergen Reactions    Gabapentin Angioedema, Anaphylaxis, Hives, Itching, Nausea And Vomiting, Rash, Swelling, Unknown and Wheezing     Itchy and swollen  throat    Mushroom Hives, Itching, Swelling and Wheezing    Strawberry Hives, Itching and Swelling    Latex      Added based on information entered during case entry, please review and add reactions, type, and severity as needed    Oxycodone-Acetaminophen Angioedema and Rash      Medications:  Scheduled medications  acetaminophen, 975 mg, oral, Once  [Held by provider] apixaban, 5 mg, oral, BID  atorvastatin, 20 mg, oral, Daily  calcium carbonate, 1,000 mg, oral, Daily  cyclobenzaprine, 10 mg, oral, Daily  [START ON 6/30/2024] ergocalciferol, 1,250 mcg, oral, Every Leo  ferrous sulfate (325 mg ferrous sulfate), 1 tablet, oral, Every other day  insulin glargine, 40 Units, subcutaneous, Nightly  insulin lispro, 0-10 Units, subcutaneous, TID  insulin lispro, 8 Units, subcutaneous, TID  lidocaine, 30 mL, infiltration, Once  metoprolol succinate XL, 50 mg, oral, Daily  mometasone, 2 puff, inhalation, BID  montelukast, 10 mg, oral, Daily  pantoprazole, 40 mg, oral, Daily   Or  pantoprazole, 40 mg, intravenous, Daily  piperacillin-tazobactam, 3.375 g, intravenous, q12h   And  sodium chloride 0.9%, 10 mL/hr, intravenous, q12h  polyethylene glycol, 17 g, oral, Daily  sevelamer carbonate, 800 mg, oral, TID      Continuous medications     PRN medications  PRN medications: acetaminophen **OR** acetaminophen **OR** acetaminophen, albuterol, albuterol, dextrose, dextrose, glucagon, glucagon, meclizine, melatonin, ondansetron ODT **OR** ondansetron, traMADol, vancomycin  Labs:  Results for orders placed or performed during the hospital encounter of 06/26/24 (from the past 24 hour(s))   POCT GLUCOSE   Result Value Ref Range    POCT Glucose 178 (H) 74 - 99 mg/dL   POCT GLUCOSE   Result Value Ref Range    POCT Glucose 132 (H) 74 - 99 mg/dL   POCT GLUCOSE   Result Value Ref Range    POCT Glucose 117 (H) 74 - 99 mg/dL   SST TOP   Result Value Ref Range    Extra Tube Hold for add-ons.    CBC   Result Value Ref Range    WBC 7.0 4.4 -  11.3 x10*3/uL    nRBC 0.0 0.0 - 0.0 /100 WBCs    RBC 3.30 (L) 4.00 - 5.20 x10*6/uL    Hemoglobin 9.3 (L) 12.0 - 16.0 g/dL    Hematocrit 31.0 (L) 36.0 - 46.0 %    MCV 94 80 - 100 fL    MCH 28.2 26.0 - 34.0 pg    MCHC 30.0 (L) 32.0 - 36.0 g/dL    RDW 13.4 11.5 - 14.5 %    Platelets 149 (L) 150 - 450 x10*3/uL   Basic metabolic panel   Result Value Ref Range    Glucose 108 (H) 74 - 99 mg/dL    Sodium 136 136 - 145 mmol/L    Potassium 4.2 3.5 - 5.3 mmol/L    Chloride 100 98 - 107 mmol/L    Bicarbonate 27 21 - 32 mmol/L    Anion Gap 13 10 - 20 mmol/L    Urea Nitrogen 48 (H) 6 - 23 mg/dL    Creatinine 7.85 (H) 0.50 - 1.05 mg/dL    eGFR 6 (L) >60 mL/min/1.73m*2    Calcium 7.5 (L) 8.6 - 10.3 mg/dL   POCT GLUCOSE   Result Value Ref Range    POCT Glucose 110 (H) 74 - 99 mg/dL      Imaging:  peripheral IV bedside imaging    Result Date: 6/27/2024  These images are not reportable by radiology and will not be interpreted by  Radiologists.    CT head wo IV contrast    Result Date: 6/26/2024  Interpreted By:  Alex Osborn, STUDY: CT HEAD WO IV CONTRAST;  6/26/2024 6:07 pm   INDICATION: Signs/Symptoms:headache, nausea.   COMPARISON: None.   ACCESSION NUMBER(S): [id number]   ORDERING CLINICIAN: RADHA NOEL   TECHNIQUE: Noncontrast axial CT scan of head was performed. Angled reformats in brain and bone windows were generated. The images were reviewed in bone, brain, blood and soft tissue windows.   FINDINGS: CSF Spaces: The ventricles, sulci and basal cisterns are within normal limits. There is no extraaxial fluid collection.   Parenchyma:  The grey-white differentiation is intact. There is no mass effect or midline shift.  There is no intracranial hemorrhage.   Calvarium: The calvarium is unremarkable.   Paranasal sinuses and mastoids: Visualized paranasal sinuses and mastoids are clear.       No evidence of acute cortical infarct or intracranial hemorrhage.       MACRO: None       Signed by: Alex Osborn 6/26/2024 6:31  PM Dictation workstation:   [id number]    ECG 12 lead    Result Date: 6/26/2024  Sinus tachycardia Otherwise normal ECG When compared with ECG of 10-MAY-2024 10:04, Premature atrial complexes are no longer Present      Assessment  POD 1 left groin I&D    On exam there is no incisional packing. Tenderness with palpation. Small amount of Purulent drainage to dressing.  Labs this morning show no leukocytosis.  Afebrile.    Plan  -Continue diet as tolerated  -Continue IV ABX  -Pain control  -Nausea control  -DVT prophylaxis-eliquis per primary team  -Pulmonary toileting   -Encourage ambulation  -Continue care per primary team   -General surgery to sign off please reach back out with any concerns      Further recommendations per Dr. Sil Carl, AHMET-CNP    Time spent  37  minutes obtaining labs, imaging, recommendations, interview, assessment, examination, medication review/ordering, and EMR review.    Plan of care was discussed extensively with patient. Patient verbalized understanding through teach back method. All questions and concerns addressed upon examination.     Of note, this documentation is completed using the Dragon Dictation system (voice recognition software). There may be spelling and/or grammatical errors that were not corrected prior to final submission.

## 2024-06-28 NOTE — NURSING NOTE
Report from Sending RN:    Report From: PASCUAL Navarro  Recent Surgery of Procedure: Yes, IND 06/27/2024  Baseline Level of Consciousness (LOC): A&O X4  Oxygen Use: No  Type: ROOM Air  Diabetic: Yes, 110  Last BP Med Given Day of Dialysis: NONE  Last Pain Med Given: NONE  Lab Tests to be Obtained with Dialysis: No  Blood Transfusion to be Given During Dialysis: No  Available IV Access: Yes  Medications to be Administered During Dialysis: No  Continuous IV Infusion Running: No  Restraints on Currently or in the Last 24 Hours: No  Hand-Off Communication: FULL CODE  Dialysis Catheter Dressing: INTACT  Last Dressing Change: I do not know, but the cath is getting pulled today

## 2024-06-29 ENCOUNTER — PHARMACY VISIT (OUTPATIENT)
Dept: PHARMACY | Facility: CLINIC | Age: 39
End: 2024-06-29
Payer: COMMERCIAL

## 2024-06-29 VITALS
SYSTOLIC BLOOD PRESSURE: 108 MMHG | HEIGHT: 67 IN | OXYGEN SATURATION: 95 % | DIASTOLIC BLOOD PRESSURE: 55 MMHG | BODY MASS INDEX: 44.26 KG/M2 | HEART RATE: 86 BPM | RESPIRATION RATE: 18 BRPM | WEIGHT: 281.97 LBS | TEMPERATURE: 96.8 F

## 2024-06-29 VITALS
TEMPERATURE: 96.8 F | OXYGEN SATURATION: 95 % | SYSTOLIC BLOOD PRESSURE: 108 MMHG | DIASTOLIC BLOOD PRESSURE: 55 MMHG | HEART RATE: 87 BPM | RESPIRATION RATE: 17 BRPM

## 2024-06-29 LAB
ATRIAL RATE: 113 BPM
BACTERIA SPEC CULT: NORMAL
GLUCOSE BLD MANUAL STRIP-MCNC: 189 MG/DL (ref 74–99)
GLUCOSE BLD MANUAL STRIP-MCNC: 85 MG/DL (ref 74–99)
GRAM STN SPEC: NORMAL
GRAM STN SPEC: NORMAL
P AXIS: 74 DEGREES
P OFFSET: 205 MS
P ONSET: 161 MS
PR INTERVAL: 136 MS
Q ONSET: 229 MS
QRS COUNT: 19 BEATS
QRS DURATION: 70 MS
QT INTERVAL: 344 MS
QTC CALCULATION(BAZETT): 471 MS
QTC FREDERICIA: 424 MS
R AXIS: -12 DEGREES
T AXIS: 43 DEGREES
T OFFSET: 401 MS
VENTRICULAR RATE: 113 BPM

## 2024-06-29 PROCEDURE — 82947 ASSAY GLUCOSE BLOOD QUANT: CPT | Mod: 91

## 2024-06-29 PROCEDURE — 99231 SBSQ HOSP IP/OBS SF/LOW 25: CPT | Performed by: STUDENT IN AN ORGANIZED HEALTH CARE EDUCATION/TRAINING PROGRAM

## 2024-06-29 PROCEDURE — 2500000002 HC RX 250 W HCPCS SELF ADMINISTERED DRUGS (ALT 637 FOR MEDICARE OP, ALT 636 FOR OP/ED): Performed by: STUDENT IN AN ORGANIZED HEALTH CARE EDUCATION/TRAINING PROGRAM

## 2024-06-29 PROCEDURE — 2500000004 HC RX 250 GENERAL PHARMACY W/ HCPCS (ALT 636 FOR OP/ED)

## 2024-06-29 PROCEDURE — 2500000001 HC RX 250 WO HCPCS SELF ADMINISTERED DRUGS (ALT 637 FOR MEDICARE OP)

## 2024-06-29 PROCEDURE — 2500000001 HC RX 250 WO HCPCS SELF ADMINISTERED DRUGS (ALT 637 FOR MEDICARE OP): Performed by: STUDENT IN AN ORGANIZED HEALTH CARE EDUCATION/TRAINING PROGRAM

## 2024-06-29 PROCEDURE — 82947 ASSAY GLUCOSE BLOOD QUANT: CPT

## 2024-06-29 SDOH — ECONOMIC STABILITY: INCOME INSECURITY: IN THE LAST 12 MONTHS, WAS THERE A TIME WHEN YOU WERE NOT ABLE TO PAY THE MORTGAGE OR RENT ON TIME?: YES

## 2024-06-29 SDOH — ECONOMIC STABILITY: HOUSING INSECURITY: IN THE PAST 12 MONTHS HAS THE ELECTRIC, GAS, OIL, OR WATER COMPANY THREATENED TO SHUT OFF SERVICES IN YOUR HOME?: YES

## 2024-06-29 SDOH — ECONOMIC STABILITY: HOUSING INSECURITY
IN THE LAST 12 MONTHS, WAS THERE A TIME WHEN YOU DID NOT HAVE A STEADY PLACE TO SLEEP OR SLEPT IN A SHELTER (INCLUDING NOW)?: YES

## 2024-06-29 SDOH — ECONOMIC STABILITY: GENERAL

## 2024-06-29 SDOH — ECONOMIC STABILITY: HOUSING INSECURITY: IN THE LAST 12 MONTHS, WAS THERE A TIME WHEN YOU WERE NOT ABLE TO PAY THE MORTGAGE OR RENT ON TIME?: YES

## 2024-06-29 SDOH — ECONOMIC STABILITY: HOUSING INSECURITY

## 2024-06-29 SDOH — ECONOMIC STABILITY: FOOD INSECURITY: WITHIN THE PAST 12 MONTHS, THE FOOD YOU BOUGHT JUST DIDN'T LAST AND YOU DIDN'T HAVE MONEY TO GET MORE.: SOMETIMES TRUE

## 2024-06-29 SDOH — ECONOMIC STABILITY: FOOD INSECURITY: WITHIN THE PAST 12 MONTHS, THE FOOD YOU BOUGHT JUST DIDN’T LAST AND YOU DIDN’T HAVE MONEY TO GET MORE.: SOMETIMES TRUE

## 2024-06-29 SDOH — ECONOMIC STABILITY: TRANSPORTATION INSECURITY: IN THE PAST 12 MONTHS, HAS LACK OF TRANSPORTATION KEPT YOU FROM MEDICAL APPOINTMENTS OR FROM GETTING MEDICATIONS?: NO

## 2024-06-29 SDOH — ECONOMIC STABILITY: FOOD INSECURITY
WITHIN THE PAST 12 MONTHS, YOU WORRIED THAT YOUR FOOD WOULD RUN OUT BEFORE YOU GOT THE MONEY TO BUY MORE.: SOMETIMES TRUE

## 2024-06-29 SDOH — ECONOMIC STABILITY: HOUSING INSECURITY: IN THE LAST 12 MONTHS, HOW MANY PLACES HAVE YOU LIVED?: 2

## 2024-06-29 SDOH — ECONOMIC STABILITY: FOOD INSECURITY: WITHIN THE PAST 12 MONTHS, YOU WORRIED THAT YOUR FOOD WOULD RUN OUT BEFORE YOU GOT MONEY TO BUY MORE.: SOMETIMES TRUE

## 2024-06-29 SDOH — ECONOMIC STABILITY: TRANSPORTATION INSECURITY

## 2024-06-29 SDOH — ECONOMIC STABILITY: FOOD INSECURITY

## 2024-06-29 ASSESSMENT — PAIN SCALES - GENERAL: PAINLEVEL_OUTOF10: 0 - NO PAIN

## 2024-06-29 ASSESSMENT — SOCIAL DETERMINANTS OF HEALTH (SDOH): IN THE PAST 12 MONTHS, HAS THE ELECTRIC, GAS, OIL, OR WATER COMPANY THREATENED TO SHUT OFF SERVICE IN YOUR HOME?: YES

## 2024-06-29 ASSESSMENT — ACTIVITIES OF DAILY LIVING (ADL): LACK_OF_TRANSPORTATION: NO

## 2024-06-29 NOTE — PROGRESS NOTES
"Renal Progress Note    Assessment and Plan:    39 y.o. yo female admitted with dizziness and headache.  Has ESRD on HD MWF.  [facility] heritage Dr. Mclaughlin.  On HD since June 2023.  Acess left avg that was just starting to be used.  Has permcath on right.  Has groin abscess.  Anemic.  Blood cx from hd growing out GNB.           Plan/  Ok for discharge  Hd catheter removed  I am worried with bacteremia as outpt that keflex may not be enough.  Added gent with hd x 3 doses.  Called into hd unit  Outpatient follow up from renal standpoint: Dr. Mclaughlin    Subjective:   Admit Date: 6/26/2024    Interval History: pt ready for discharge.  Being sent out on keflex.  No fevers.  Blood cx from HD unit klebsiella sens to most things except ampicillin      Medications:   Scheduled Meds:acetaminophen, 975 mg, oral, Once  [Held by provider] apixaban, 5 mg, oral, BID  atorvastatin, 20 mg, oral, Daily  calcium carbonate, 1,000 mg, oral, Daily  cyclobenzaprine, 10 mg, oral, Daily  [START ON 6/30/2024] ergocalciferol, 1,250 mcg, oral, Every Sunday  ferrous sulfate (325 mg ferrous sulfate), 1 tablet, oral, Every other day  insulin glargine, 40 Units, subcutaneous, Nightly  insulin lispro, 0-10 Units, subcutaneous, TID  insulin lispro, 8 Units, subcutaneous, TID  lidocaine, 30 mL, infiltration, Once  metoprolol succinate XL, 50 mg, oral, Daily  mometasone, 2 puff, inhalation, BID  montelukast, 10 mg, oral, Daily  pantoprazole, 40 mg, oral, Daily   Or  pantoprazole, 40 mg, intravenous, Daily  piperacillin-tazobactam, 3.375 g, intravenous, q12h   And  sodium chloride 0.9%, 10 mL/hr, intravenous, q12h  polyethylene glycol, 17 g, oral, Daily  sevelamer carbonate, 800 mg, oral, TID      Continuous Infusions:     CBC:   Lab Results   Component Value Date    HGB 9.3 (L) 06/28/2024    HGB 9.0 (L) 06/27/2024    WBC 7.0 06/28/2024    WBC 10.8 06/27/2024     (L) 06/28/2024     (L) 06/27/2024      Anemia:  No results found for: \"FERRITIN\", " "\"IRON\", \"TIBC\"   BMP:    Lab Results   Component Value Date     06/28/2024     06/27/2024    K 4.2 06/28/2024    K 4.5 06/27/2024     06/28/2024    CL 95 (L) 06/27/2024    CO2 27 06/28/2024    CO2 25 06/27/2024    BUN 48 (H) 06/28/2024    BUN 39 (H) 06/27/2024    CREATININE 7.85 (H) 06/28/2024    CREATININE 6.85 (H) 06/27/2024      Bone disease: No results found for: \"PHOS\", \"PTH\", \"VITD25\"   Urinalysis:  No results found for: \"LAUREN\", \"PROTUR\", \"GLUCOSEU\", \"BLOODU\", \"KETONESU\", \"BILIRUBINU\", \"NITRITEU\", \"LEUKOCYTESU\", \"UTPCR\"     Objective:   Vitals: /64   Pulse 87   Temp 36.1 °C (97 °F)   Resp 17   Ht 1.702 m (5' 7\")   Wt 128 kg (281 lb 15.5 oz)   SpO2 95%   BMI 44.16 kg/m²    Wt Readings from Last 3 Encounters:   06/26/24 128 kg (281 lb 15.5 oz)   06/27/24 124 kg (272 lb 12.8 oz)   06/11/24 127 kg (280 lb 12.8 oz)      24HR INTAKE/OUTPUT:    Intake/Output Summary (Last 24 hours) at 6/29/2024 1113  Last data filed at 6/29/2024 0900  Gross per 24 hour   Intake 750 ml   Output 4400 ml   Net -3650 ml     Admission weight:  Weight: 102 kg (225 lb)      Constitutional:  Alert, awake, no apparent distress   Skin:normal, no rash  HEENT:sclera anicteric.  Head atraumatic normocephalic  Neck:supple with no thyromegally  Cardiovascular:  S1, S2 without m/r/g   Respiratory:  CTA B without w/r/r   Abdomen: +bs, soft, nt  Ext: no LE edema  Musculoskeletal:Intact  Neuro:Alert and oriented with no deficit      Electronically signed by Jose L Vega MD on 6/29/2024 at 11:13 AM            "

## 2024-06-29 NOTE — PROGRESS NOTES
Medical Group Progress Note  ASSESSMENT & PLAN:     Alexus Haley is a 39 y.o. female with past medical history of ESRD on dialysis, asthma, HLD, and hypertension who was admitted for management of following issues:     Dizziness, gradually improving, headache  Leukocytosis resolved  ESRD on dialysis  Left groin abscess  -Presented to the ER with dizziness after dialysis. CT head negative.  -started on Vanc and Zosyn for abscess  -General surgery consulted, s/p I&D at bedside of the abscess, IV abx was switched to oral Keflex on discharge   -Meclizine as needed for dizziness was placed   -Blood and tissue cultures so far negative   -Nephrology consulted for dialysis, received dialysis here   -Rt upper chest permacath was removed   -Monitored renal function, avoided nephrotoxic agent  -she was also placed on Pain management, antiemetic     Diabetes Mellitus  -SSI, Accuchecks, hypoglycemic protocol, DM diet placed      Pulmonary Embolism  -Continued Eliquis however was held for permacath removal      Hypertension/HLD  -Continued home meds     Hypocalcemia  -Was replaced     VTE prophylaxis: was with Eliquis     Currently patient is hemodynamically stable and ready for discharge to home.  She will follow-up as outpatient with dialysis center and nephrology and PCP as needed.  She will be continued on Keflex for few more days for left groin abscess.  She will be continued on the rest of her home medication.    06/29  -Patient had permacath removed. awaiting cultures from catheter status post removal  - nephrology on board.  Adding 3 doses of gentamicin with hemodialysis in addition to prior plan of oral Keflex on discharge  -  Currently Eliquis restarted  - MRI done  which showed no evidence of acute infarction.   noted with a punctate focus in the white matter of the left frontal lobe possibly associated with migraines versus headaches versus hypertension versus a small vessel ischemic change versus demyelinating  processes.  Minimal mucosal thickening of the inferior right maxillary sinus and and ethmoid air cells.  Opacification of the right middle ear cavity and right mastoid air cells possibly due to postop obstructive or inflammatory etiology.  Postop changes due to previous left-sided mastectomy noted with opacification of a few residual surrounding left mastoid air cells.  -  rest of care as above.  - patient will be discharged home with plan to follow-up with nephrology        Total time >25 min; > 50% spent counseling/coordinating care    -----This note was prepared using Dragon Medical voice recognition software. As a result, errors may occur. When identified, these errors have been corrected. While every attempt is made to correct errors during dictation, errors may still exist.------    Aleks Casas MD    SUBJECTIVE      no acute events overnight.  Patient in no acute distress.    OBJECTIVE:     Last Recorded Vitals:  Vitals:    06/28/24 1345 06/29/24 0030 06/29/24 0720 06/29/24 1100   BP: 124/71 124/58 126/64 108/55   BP Location: Right arm   Right arm   Patient Position: Lying   Sitting   Pulse: 96 83 87 86   Resp: 16 17 17 18   Temp: 36.1 °C (97 °F) 35.8 °C (96.4 °F) 36.1 °C (97 °F) 36 °C (96.8 °F)   TempSrc: Tympanic   Temporal   SpO2: 95% 97% 95% 95%   Weight:       Height:         Last I/O:  I/O last 3 completed shifts:  In: 1421 (11.1 mL/kg) [I.V.:871 (6.8 mL/kg); Other:400; IV Piggyback:150]  Out: 4400 (34.4 mL/kg) [Other:4400]  Weight: 127.9 kg     Physical Exam    General: Well-developed morbidly obese female, in no acute distress  HEENT: AT, NC, no JVD, no lymphadenopathy, neck supple  Lungs: Clear, no wheezing, no crackles  Cardiac: Normal S1-S2, no murmur, no gallop  Abdomen: Soft, obese, nontender, no distention, positive bowel sound  Extremities: No deformity, no edema, pulses intact, ROM intact, left groin is wrapped, left arm dialysis cath intact   Neurological: Alert awake oriented x3,  sensation intact, clear speech    Inpatient Medications:  acetaminophen, 975 mg, oral, Once  [Held by provider] apixaban, 5 mg, oral, BID  atorvastatin, 20 mg, oral, Daily  calcium carbonate, 1,000 mg, oral, Daily  cyclobenzaprine, 10 mg, oral, Daily  [START ON 6/30/2024] ergocalciferol, 1,250 mcg, oral, Every Sunday  ferrous sulfate (325 mg ferrous sulfate), 1 tablet, oral, Every other day  insulin glargine, 40 Units, subcutaneous, Nightly  insulin lispro, 0-10 Units, subcutaneous, TID  insulin lispro, 8 Units, subcutaneous, TID  lidocaine, 30 mL, infiltration, Once  metoprolol succinate XL, 50 mg, oral, Daily  mometasone, 2 puff, inhalation, BID  montelukast, 10 mg, oral, Daily  pantoprazole, 40 mg, oral, Daily   Or  pantoprazole, 40 mg, intravenous, Daily  piperacillin-tazobactam, 3.375 g, intravenous, q12h   And  sodium chloride 0.9%, 10 mL/hr, intravenous, q12h  polyethylene glycol, 17 g, oral, Daily  sevelamer carbonate, 800 mg, oral, TID    PRN Medications  PRN medications: acetaminophen **OR** acetaminophen **OR** acetaminophen, albuterol, albuterol, dextrose, dextrose, glucagon, glucagon, meclizine, melatonin, ondansetron ODT **OR** ondansetron, traMADol, vancomycin  Continuous Medications:     LABS AND IMAGING:     Labs:  Results from last 7 days   Lab Units 06/28/24  0533 06/27/24  0529 06/26/24  1300   WBC AUTO x10*3/uL 7.0 10.8 15.5*   RBC AUTO x10*6/uL 3.30* 3.16* 3.54*   HEMOGLOBIN g/dL 9.3* 9.0* 10.1*   HEMATOCRIT % 31.0* 29.0* 32.3*   MCV fL 94 92 91   MCH pg 28.2 28.5 28.5   MCHC g/dL 30.0* 31.0* 31.3*   RDW % 13.4 13.3 13.2   PLATELETS AUTO x10*3/uL 149* 129* 130*     Results from last 7 days   Lab Units 06/28/24  0533 06/27/24  0529 06/26/24  1300   SODIUM mmol/L 136 140 134*   POTASSIUM mmol/L 4.2 4.5 3.5   CHLORIDE mmol/L 100 95* 98   CO2 mmol/L 27 25 29   BUN mg/dL 48* 39* 30*   CREATININE mg/dL 7.85* 6.85* 5.45*   GLUCOSE mg/dL 108* 131* 122*   PROTEIN TOTAL g/dL  --   --  6.7   CALCIUM mg/dL  7.5* 6.9* 7.4*   BILIRUBIN TOTAL mg/dL  --   --  0.5   ALK PHOS U/L  --   --  90   AST U/L  --   --  11   ALT U/L  --   --  7         Results from last 7 days   Lab Units 06/26/24  1300   TROPHS ng/L 13     Imaging:  MR brain wo IV contrast  Narrative: Interpreted By:  Serafin Del Toro,   STUDY:  MR BRAIN WO IV CONTRAST;  6/28/2024 6:22 pm      INDICATION:  Signs/Symptoms:Intractable headache. Left sided facial numbness. Lip  numbness..      COMPARISON:  CT of the head dated 06/26/2024      ACCESSION NUMBER(S):  [id number]      ORDERING CLINICIAN:  KENDAL DOBBS      TECHNIQUE:  Axial diffusion, axial T2, axial FLAIR, axial gradient echo T2,  coronal T1, and sagittal T1 weighted MRI images of the brain were  obtained without intravenous contrast administration.      FINDINGS:  The diffusion weighted images fail to demonstrate evidence of  abnormal diffusion restriction to suggest acute infarction.      The ventricular system is nondilated.      There is a punctate focus of nonspecific subcortical white matter  changes noted within the left frontal lobe convexity as seen on axial  FLAIR slice 30 of 40. While nonspecific, subcortical white matter  changes can be seen with migraine headaches, hypertension,  small-vessel ischemic change, or demyelinating processes among others.      No abnormal intracranial mass lesion is noted on this noncontrast MRI  study.      The cerebellar tonsils are normally positioned without evidence of  Chiari malformation.      There is a minimal mucosal thickening noted within the inferior right  maxillary sinus and a few scattered ethmoid air cells.      There is opacification of the right middle ear cavity and right  mastoid air cells which may be postobstructive and/or inflammatory in  origin. Correlation with otoscopic examination is recommended.      Postoperative changes are again identified compatible with a previous  left sided mastoidectomy. There is opacification of a few  residual  surrounding left mastoid air cells.      Impression: There is no MRI evidence of acute infarction on the diffusion  weighted images.      There is a punctate focus of nonspecific subcortical white matter  changes noted within the left frontal lobe convexity as seen on axial  FLAIR slice 30 of 40. While nonspecific, subcortical white matter  changes can be seen with migraine headaches, hypertension,  small-vessel ischemic change, or demyelinating processes among others.      There is a minimal mucosal thickening noted within the inferior right  maxillary sinus and a few scattered ethmoid air cells.      There is opacification of the right middle ear cavity and right  mastoid air cells which may be postobstructive and/or inflammatory in  origin. Correlation with otoscopic examination is recommended.      Postoperative changes are again identified compatible with a previous  left sided mastoidectomy. There is opacification of a few residual  surrounding left mastoid air cells.      MACRO:  None.      Signed by: Serafin Del Toro 6/28/2024 6:30 PM  Dictation workstation:   [id number]  IR CVC removal  Narrative: Interpreted By:  Schoenberger, Joseph,   STUDY:  IR CVC REMOVAL; ;  6/28/2024 3:37 pm      INDICATION:  Signs/Symptoms:remove permcath.  pt bacteremic.  we are using her AVG.      COMPARISON:  None.      ACCESSION NUMBER(S):  [id number]      ORDERING CLINICIAN:  TABITHA MASON      TECHNIQUE:  The patient's indwelling dialysis catheter and adjacent chest wall  and lower right neck soft tissues were prepped sterilely with  chlorhexidine. Sterile barrier drape were placed. Local anesthetic  was administered at the retention cuffed and tunnel exit. Using blunt  dissection the retention cuff was freed. The catheter was then  removed intact without difficulty. Hemostasis was obtained using  manual compression over the tunnel and base of the right side of the  neck. The patient tolerated the procedure well and  there was no  immediate complication      FINDINGS:  See discussion above      Impression: Successful removal of the patient's cuffed, tunneled chronic  hemodialysis catheter.          MACRO:  None      Signed by: Joseph Schoenberger 6/28/2024 3:54 PM  Dictation workstation:   [id number]

## 2024-06-30 LAB
BACTERIA BLD CULT: NORMAL
BACTERIA SPEC CULT: NORMAL
GRAM STN SPEC: NORMAL
GRAM STN SPEC: NORMAL

## 2024-07-01 LAB
B-LACTAMASE ORGANISM ISLT: POSITIVE
BACTERIA BLD CULT: NORMAL
BACTERIA SPEC CULT: NORMAL
GRAM STN SPEC: NORMAL
GRAM STN SPEC: NORMAL

## 2024-07-02 ENCOUNTER — TELEPHONE (OUTPATIENT)
Dept: OBSTETRICS AND GYNECOLOGY | Facility: CLINIC | Age: 39
End: 2024-07-02
Payer: COMMERCIAL

## 2024-07-02 NOTE — TELEPHONE ENCOUNTER
7/2/24 - Pt was scheduled for Rt Lab. [facility]ore[facility] Reduct. On 7/1 but was cancelled due to infection and was put back on Eloquis.  Per Dr. Zambrano she will need to be off 3 days prior and 2 days post when she reschedules.  Got a hold of pt this day, states she has a lot going on rt now and may be moving to [place].  She does Not want to reschedule at this time.  She will call us if and when she is able to.

## 2024-07-05 ENCOUNTER — TELEPHONE (OUTPATIENT)
Dept: TRANSPLANT | Facility: HOSPITAL | Age: 39
End: 2024-07-05
Payer: COMMERCIAL

## 2024-07-08 ENCOUNTER — TELEPHONE (OUTPATIENT)
Dept: TRANSPLANT | Facility: HOSPITAL | Age: 39
End: 2024-07-08
Payer: COMMERCIAL

## 2024-07-08 DIAGNOSIS — Z01.818 PRE-TRANSPLANT EVALUATION FOR KIDNEY TRANSPLANT: Primary | ICD-10-CM

## 2024-07-08 NOTE — TELEPHONE ENCOUNTER
Do you have difficulty reading or writing in English?   no   What is the primary cause of your kidney disease?   diabetes  Are you currently on dialysis?   yes  How many years have you been on dialysis? 1year  If yes, what days do you have your dialysis treatments?   Mon,Wed,Fri  Have you received a transplant before?   no  If yes, what organ, and when and where was your transplant?   no  Have you been diagnosed with diabetes?    yes  Have you tested positive for hepatitis or HIV?   no  Have you ever been diagnosed with cancer?   no  If yes, what type of cancer, and when and where were you treated?   no  Do you have a history of a heart attack or stroke? no  Are you currently or have you previously been seen by a mental health professional?   yes  If yes, what is the name of your mental health provider?   Patient stated first appt will be 8/24 at [facility]  Are you a current or former tobacco user?   yes  Do you have history of alcohol abuse or dependence?   no  Do you have a history of illegal drug abuse or dependence?   no  Has anyone told you they're willing to donate their kidney to you?   yes  What is your blood type?  A+  Comments:   Patient stated PCP is at [facility] Doctor name unknown,Neph-   Intake is done Evaluation is scheduled 10/29/24

## 2024-07-15 ENCOUNTER — APPOINTMENT (OUTPATIENT)
Dept: OBSTETRICS AND GYNECOLOGY | Facility: CLINIC | Age: 39
End: 2024-07-15
Payer: COMMERCIAL

## 2024-07-16 ENCOUNTER — APPOINTMENT (OUTPATIENT)
Dept: OBSTETRICS AND GYNECOLOGY | Facility: CLINIC | Age: 39
End: 2024-07-16
Payer: COMMERCIAL

## 2024-07-16 VITALS — SYSTOLIC BLOOD PRESSURE: 140 MMHG | WEIGHT: 267.9 LBS | BODY MASS INDEX: 41.96 KG/M2 | DIASTOLIC BLOOD PRESSURE: 70 MMHG

## 2024-07-16 DIAGNOSIS — Z30.430 ENCOUNTER FOR IUD INSERTION: Primary | ICD-10-CM

## 2024-07-16 LAB — PREGNANCY TEST URINE, POC: NEGATIVE

## 2024-07-16 PROCEDURE — 87591 N.GONORRHOEAE DNA AMP PROB: CPT

## 2024-07-16 PROCEDURE — 87491 CHLMYD TRACH DNA AMP PROBE: CPT

## 2024-07-16 PROCEDURE — 81025 URINE PREGNANCY TEST: CPT | Performed by: ADVANCED PRACTICE MIDWIFE

## 2024-07-16 PROCEDURE — 87661 TRICHOMONAS VAGINALIS AMPLIF: CPT

## 2024-07-16 PROCEDURE — 58300 INSERT INTRAUTERINE DEVICE: CPT | Performed by: ADVANCED PRACTICE MIDWIFE

## 2024-07-16 NOTE — PROGRESS NOTES
IUD INSERTION PROCEDURE NOTE      Patient's pre-procedure questions were answered and a written informed consent form was signed.   Urine HCG negative.  Speculum was placed in the vagina and the cervix was then cleansed with Betadine.    The uterus was sounded.  The introducer was then inserted up into the uterus to approximately a cm below the uterine fundus.  The IUD was then unloaded from the introducer at that location, and then the introducer was then removed.  The IUD strings were cut to 2.5 cm.    The patient tolerated the procedure well.  There were no complications.  Blood loss was minimal.    EMI Coreas

## 2024-07-17 ENCOUNTER — APPOINTMENT (OUTPATIENT)
Dept: OBSTETRICS AND GYNECOLOGY | Facility: CLINIC | Age: 39
End: 2024-07-17
Payer: COMMERCIAL

## 2024-07-17 LAB — T VAGINALIS RRNA SPEC QL NAA+PROBE: NEGATIVE

## 2024-07-17 NOTE — TELEPHONE ENCOUNTER
Appears patient is currently scheduled for a GYN procedure- patient has not completed Echo or NM testing.     Please call to arrange.

## 2024-07-18 LAB
C TRACH RRNA SPEC QL NAA+PROBE: NEGATIVE
N GONORRHOEA DNA SPEC QL PROBE+SIG AMP: NEGATIVE

## 2024-07-29 NOTE — TELEPHONE ENCOUNTER
Neither testing have been completed.     Will close this note moving forward.   If patient will require future bariatric clearance she will need to complete Echo and NM testing and follow up with Dr. Geena Pascual MD [facility] or general cardiology again at that time.

## 2024-08-01 ENCOUNTER — APPOINTMENT (OUTPATIENT)
Dept: OTOLARYNGOLOGY | Facility: CLINIC | Age: 39
End: 2024-08-01
Payer: COMMERCIAL

## 2024-08-27 ENCOUNTER — APPOINTMENT (OUTPATIENT)
Dept: BEHAVIORAL HEALTH | Facility: CLINIC | Age: 39
End: 2024-08-27
Payer: COMMERCIAL

## 2024-09-20 NOTE — PROGRESS NOTES
"GYNECOLOGY PROGRESS NOTE        CC:  Patient here for her annual and to have her IUD removed. Her last pap was not able to be found. She denies any Hx of abnormal paps. Denies need for STI testing. Has a female partner but when she wants to try to achieve a pregnancy she has a male partner that she has been with in the past. She has a Mirena in place since last year due to bleeding issues. No breast issues. I discussed at some length that she would be very high risk if she became pregnant. Patient has multiple health issues and is on dialysis. She was asked several times and made aware of the risk if she would become pregnant and she stated she still wanted the IUD removed. I discussed with her talking with one of the high risk Obs or MFM to discuss health issues and pregnancy.   Chief Complaint   Patient presents with    New Patient Visit     Alexus is here today for new patient annual exam.    Complaints: States has vulva extra skin.   LMP: IUD 9/2023 wants it removed. Wants to conceive   LAST PAP: 9/2023  ABN PAP HX: no   CONTRACEPTION: IUD  SEXUAL ACTIVITY: yes           HPI:  Alexus Haley is here for a routine GYN examination.  No GYN c/o, no AUB.      Depression screen:  negative      ROS:  GI - no blood in BMs  URO - no hematuria  GYN - no AUB or vaginal discharge  PSYCH - mood OK        PHYSICAL EXAM:  /62 (BP Location: Right arm, Patient Position: Sitting)   Ht 1.702 m (5' 7\")   Wt 128 kg (281 lb 11.2 oz)   BMI 44.12 kg/m²   GEN:  A&O, NAD  URO:  normal urethra, no bladder TTP  GYN:  Right labia larger in size verses the Left. Perineum w/o lesions or ulcers, normal vagina without discharge or lesions, normal cervix without lesions or discharge or CMT, uterus NT/NE, adnexa mobile and NT/NE  BREAST:  no masses or TTP, no skin lesions or nipple discharge  DERM:  no hirsutism or acne   PSYCH:  normal affect, non-anxious      IMPRESSION/PLAN:    A: Multiple health issues but wanting to achieve a " pregnancy. Normal pelvic. IUD strings noteed. No abnormal pap Hx.    Plan: 1. Pap and if wnl then repeat 5 years. 2. Patient wants IUD out and is aware of risks of pregnancy and her health issues. 3. Referred to  to discuss reduction of size of the Right labia. 4. Referral placed for M to reach out for periconceptional counseling.  Problem List Items Addressed This Visit    None  Visit Diagnoses       Screening for cervical cancer                     EMI Coreas    IUD REMOVAL PROCEDURE NOTE      Patient's pre-procedure questions were answered and a written informed consent form was signed.   Speculum placed in the vagina.   IUD strings identified and were grasped, then with traction the IUD was removed without difficulty.  After removal the IUD was inspected and it was completely intact.  Speculum was removed.  Patient tolerated the procedure well, there are no complications.  EBL minimal.      EMI Coreas   Duration Of Freeze Thaw-Cycle (Seconds): 0 Show Applicator Variable?: Yes Render Note In Bullet Format When Appropriate: No Consent: The patient's consent was obtained including but not limited to risks of crusting, scabbing, blistering, scarring, darker or lighter pigmentary change, recurrence, incomplete removal and infection. Post-Care Instructions: I reviewed with the patient in detail post-care instructions. Patient is to wear sunprotection, and avoid picking at any of the treated lesions. Pt may apply Vaseline to crusted or scabbing areas. Detail Level: Detailed

## 2024-09-25 ENCOUNTER — DOCUMENTATION (OUTPATIENT)
Dept: SURGERY | Facility: HOSPITAL | Age: 39
End: 2024-09-25
Payer: COMMERCIAL

## 2024-09-25 NOTE — PROGRESS NOTES
I called and spoke with the pt, and she is still interested in pursuing bariatric surgery, but she no longer lives in the state of [place]. She was advised she would need to locate a in-state provider where she currently resides. Pt stated understanding. Pt dropped.

## 2024-10-29 ENCOUNTER — APPOINTMENT (OUTPATIENT)
Dept: TRANSPLANT | Facility: HOSPITAL | Age: 39
End: 2024-10-29
Payer: MEDICARE

## 2025-01-15 ENCOUNTER — TELEPHONE (OUTPATIENT)
Facility: HOSPITAL | Age: 40
End: 2025-01-15
Payer: COMMERCIAL

## 2025-01-15 NOTE — TELEPHONE ENCOUNTER
PATIENT LETTER WAS MARKED AS RETURN TO SENDER   CALLED PATIENT TO INFORM ABOUT [facility] DEPT MOVE   PT NO LONGER LIVES IN [place]